# Patient Record
Sex: MALE | Race: WHITE | NOT HISPANIC OR LATINO | Employment: OTHER | URBAN - NONMETROPOLITAN AREA
[De-identification: names, ages, dates, MRNs, and addresses within clinical notes are randomized per-mention and may not be internally consistent; named-entity substitution may affect disease eponyms.]

---

## 2022-01-05 ENCOUNTER — HOSPITAL ENCOUNTER (INPATIENT)
Facility: HOSPITAL | Age: 72
LOS: 6 days | Discharge: HOME | DRG: 177 | End: 2022-01-12
Attending: EMERGENCY MEDICINE | Admitting: INTERNAL MEDICINE
Payer: MEDICARE

## 2022-01-05 ENCOUNTER — APPOINTMENT (EMERGENCY)
Dept: RADIOLOGY | Facility: HOSPITAL | Age: 72
DRG: 177 | End: 2022-01-05
Payer: MEDICARE

## 2022-01-05 DIAGNOSIS — N39.0 UTI (URINARY TRACT INFECTION): Primary | ICD-10-CM

## 2022-01-05 DIAGNOSIS — U07.1 COVID: ICD-10-CM

## 2022-01-05 DIAGNOSIS — R77.8 ELEVATED TROPONIN: ICD-10-CM

## 2022-01-05 DIAGNOSIS — R09.02 HYPOXIA: ICD-10-CM

## 2022-01-05 DIAGNOSIS — K21.9 GERD (GASTROESOPHAGEAL REFLUX DISEASE): ICD-10-CM

## 2022-01-05 DIAGNOSIS — I20.9 ANGINA PECTORIS, UNSPECIFIED (HCC): ICD-10-CM

## 2022-01-05 DIAGNOSIS — I50.20 SYSTOLIC CONGESTIVE HEART FAILURE, UNSPECIFIED HF CHRONICITY (HCC): ICD-10-CM

## 2022-01-05 PROBLEM — G82.20 PARAPLEGIA (HCC): Status: ACTIVE | Noted: 2022-01-05

## 2022-01-05 PROBLEM — L97.522 SKIN ULCER OF TOE OF LEFT FOOT WITH FAT LAYER EXPOSED (HCC): Status: ACTIVE | Noted: 2022-01-05

## 2022-01-05 PROBLEM — L97.512 SKIN ULCER OF TOE OF RIGHT FOOT WITH FAT LAYER EXPOSED (HCC): Status: ACTIVE | Noted: 2022-01-05

## 2022-01-05 PROBLEM — I73.9 PAD (PERIPHERAL ARTERY DISEASE) (HCC): Status: ACTIVE | Noted: 2022-01-05

## 2022-01-05 PROBLEM — N30.00 ACUTE CYSTITIS WITHOUT HEMATURIA: Status: ACTIVE | Noted: 2022-01-05

## 2022-01-05 PROBLEM — J96.01 ACUTE RESPIRATORY FAILURE WITH HYPOXIA (HCC): Status: ACTIVE | Noted: 2022-01-05

## 2022-01-05 LAB
ALBUMIN SERPL BCP-MCNC: 3.8 G/DL (ref 3.5–5)
ALP SERPL-CCNC: 62 U/L (ref 46–116)
ALT SERPL W P-5'-P-CCNC: 64 U/L (ref 12–78)
ANION GAP SERPL CALCULATED.3IONS-SCNC: 11 MMOL/L (ref 4–13)
AST SERPL W P-5'-P-CCNC: 88 U/L (ref 5–45)
BACTERIA UR QL AUTO: ABNORMAL /HPF
BASOPHILS # BLD AUTO: 0 THOUSANDS/ΜL (ref 0–0.1)
BASOPHILS NFR BLD AUTO: 0 % (ref 0–1)
BILIRUB SERPL-MCNC: 0.4 MG/DL (ref 0.2–1)
BILIRUB UR QL STRIP: NEGATIVE
BUN SERPL-MCNC: 12 MG/DL (ref 5–25)
CALCIUM SERPL-MCNC: 8.8 MG/DL (ref 8.3–10.1)
CARDIAC TROPONIN I PNL SERPL HS: 49 NG/L (ref 8–18)
CARDIAC TROPONIN I PNL SERPL HS: 85 NG/L (ref 8–18)
CHLORIDE SERPL-SCNC: 101 MMOL/L (ref 100–108)
CLARITY UR: ABNORMAL
CO2 SERPL-SCNC: 26 MMOL/L (ref 21–32)
COLOR UR: YELLOW
CREAT SERPL-MCNC: 0.67 MG/DL (ref 0.6–1.3)
EOSINOPHIL # BLD AUTO: 0 THOUSAND/ΜL (ref 0–0.61)
EOSINOPHIL NFR BLD AUTO: 0 % (ref 0–6)
ERYTHROCYTE [DISTWIDTH] IN BLOOD BY AUTOMATED COUNT: 13.4 % (ref 11.6–15.1)
GFR SERPL CREATININE-BSD FRML MDRD: 98 ML/MIN/1.73SQ M
GLUCOSE SERPL-MCNC: 82 MG/DL (ref 65–140)
GLUCOSE UR STRIP-MCNC: NEGATIVE MG/DL
HCT VFR BLD AUTO: 35.8 % (ref 36.5–49.3)
HGB BLD-MCNC: 11.9 G/DL (ref 12–17)
HGB UR QL STRIP.AUTO: ABNORMAL
IMM GRANULOCYTES # BLD AUTO: 0.01 THOUSAND/UL (ref 0–0.2)
IMM GRANULOCYTES NFR BLD AUTO: 0 % (ref 0–2)
KETONES UR STRIP-MCNC: ABNORMAL MG/DL
LACTATE SERPL-SCNC: 1.2 MMOL/L (ref 0.5–2)
LEUKOCYTE ESTERASE UR QL STRIP: NEGATIVE
LIPASE SERPL-CCNC: 196 U/L (ref 73–393)
LYMPHOCYTES # BLD AUTO: 0.17 THOUSANDS/ΜL (ref 0.6–4.47)
LYMPHOCYTES NFR BLD AUTO: 6 % (ref 14–44)
MCH RBC QN AUTO: 29 PG (ref 26.8–34.3)
MCHC RBC AUTO-ENTMCNC: 33.2 G/DL (ref 31.4–37.4)
MCV RBC AUTO: 87 FL (ref 82–98)
MONOCYTES # BLD AUTO: 0.09 THOUSAND/ΜL (ref 0.17–1.22)
MONOCYTES NFR BLD AUTO: 3 % (ref 4–12)
MUCOUS THREADS UR QL AUTO: ABNORMAL
NEUTROPHILS # BLD AUTO: 2.76 THOUSANDS/ΜL (ref 1.85–7.62)
NEUTS SEG NFR BLD AUTO: 91 % (ref 43–75)
NITRITE UR QL STRIP: POSITIVE
NON-SQ EPI CELLS URNS QL MICRO: ABNORMAL /HPF
NRBC BLD AUTO-RTO: 0 /100 WBCS
PH UR STRIP.AUTO: 5.5 [PH]
PLATELET # BLD AUTO: 190 THOUSANDS/UL (ref 149–390)
PMV BLD AUTO: 11.2 FL (ref 8.9–12.7)
POTASSIUM SERPL-SCNC: 3.5 MMOL/L (ref 3.5–5.3)
PROT SERPL-MCNC: 8.5 G/DL (ref 6.4–8.2)
PROT UR STRIP-MCNC: NEGATIVE MG/DL
RBC # BLD AUTO: 4.1 MILLION/UL (ref 3.88–5.62)
RBC #/AREA URNS AUTO: ABNORMAL /HPF
SODIUM SERPL-SCNC: 138 MMOL/L (ref 136–145)
SP GR UR STRIP.AUTO: 1.02 (ref 1–1.03)
UROBILINOGEN UR QL STRIP.AUTO: 0.2 E.U./DL
WBC # BLD AUTO: 3.03 THOUSAND/UL (ref 4.31–10.16)
WBC #/AREA URNS AUTO: ABNORMAL /HPF

## 2022-01-05 PROCEDURE — 83605 ASSAY OF LACTIC ACID: CPT | Performed by: PHYSICIAN ASSISTANT

## 2022-01-05 PROCEDURE — 93005 ELECTROCARDIOGRAM TRACING: CPT

## 2022-01-05 PROCEDURE — 84443 ASSAY THYROID STIM HORMONE: CPT | Performed by: STUDENT IN AN ORGANIZED HEALTH CARE EDUCATION/TRAINING PROGRAM

## 2022-01-05 PROCEDURE — 99285 EMERGENCY DEPT VISIT HI MDM: CPT | Performed by: PHYSICIAN ASSISTANT

## 2022-01-05 PROCEDURE — 84484 ASSAY OF TROPONIN QUANT: CPT | Performed by: PHYSICIAN ASSISTANT

## 2022-01-05 PROCEDURE — 83690 ASSAY OF LIPASE: CPT | Performed by: PHYSICIAN ASSISTANT

## 2022-01-05 PROCEDURE — 96374 THER/PROPH/DIAG INJ IV PUSH: CPT

## 2022-01-05 PROCEDURE — 99285 EMERGENCY DEPT VISIT HI MDM: CPT

## 2022-01-05 PROCEDURE — 71045 X-RAY EXAM CHEST 1 VIEW: CPT

## 2022-01-05 PROCEDURE — 96375 TX/PRO/DX INJ NEW DRUG ADDON: CPT

## 2022-01-05 PROCEDURE — 1124F ACP DISCUSS-NO DSCNMKR DOCD: CPT | Performed by: PHYSICIAN ASSISTANT

## 2022-01-05 PROCEDURE — 99220 PR INITIAL OBSERVATION CARE/DAY 70 MINUTES: CPT | Performed by: STUDENT IN AN ORGANIZED HEALTH CARE EDUCATION/TRAINING PROGRAM

## 2022-01-05 PROCEDURE — 96361 HYDRATE IV INFUSION ADD-ON: CPT

## 2022-01-05 PROCEDURE — 81001 URINALYSIS AUTO W/SCOPE: CPT | Performed by: PHYSICIAN ASSISTANT

## 2022-01-05 PROCEDURE — 36415 COLL VENOUS BLD VENIPUNCTURE: CPT | Performed by: PHYSICIAN ASSISTANT

## 2022-01-05 PROCEDURE — 85025 COMPLETE CBC W/AUTO DIFF WBC: CPT | Performed by: PHYSICIAN ASSISTANT

## 2022-01-05 PROCEDURE — 80053 COMPREHEN METABOLIC PANEL: CPT | Performed by: PHYSICIAN ASSISTANT

## 2022-01-05 PROCEDURE — 94640 AIRWAY INHALATION TREATMENT: CPT

## 2022-01-05 RX ORDER — DEXAMETHASONE SODIUM PHOSPHATE 4 MG/ML
10 INJECTION, SOLUTION INTRA-ARTICULAR; INTRALESIONAL; INTRAMUSCULAR; INTRAVENOUS; SOFT TISSUE ONCE
Status: COMPLETED | OUTPATIENT
Start: 2022-01-05 | End: 2022-01-05

## 2022-01-05 RX ORDER — IPRATROPIUM BROMIDE AND ALBUTEROL SULFATE 2.5; .5 MG/3ML; MG/3ML
3 SOLUTION RESPIRATORY (INHALATION) ONCE
Status: COMPLETED | OUTPATIENT
Start: 2022-01-05 | End: 2022-01-05

## 2022-01-05 RX ORDER — CEFTRIAXONE 1 G/50ML
1000 INJECTION, SOLUTION INTRAVENOUS ONCE
Status: COMPLETED | OUTPATIENT
Start: 2022-01-05 | End: 2022-01-05

## 2022-01-05 RX ORDER — ONDANSETRON 2 MG/ML
4 INJECTION INTRAMUSCULAR; INTRAVENOUS ONCE
Status: COMPLETED | OUTPATIENT
Start: 2022-01-05 | End: 2022-01-05

## 2022-01-05 RX ORDER — ACETAMINOPHEN 325 MG/1
650 TABLET ORAL ONCE
Status: DISCONTINUED | OUTPATIENT
Start: 2022-01-05 | End: 2022-01-06

## 2022-01-05 RX ADMIN — SODIUM CHLORIDE 1000 ML: 0.9 INJECTION, SOLUTION INTRAVENOUS at 17:50

## 2022-01-05 RX ADMIN — ONDANSETRON 4 MG: 2 INJECTION INTRAMUSCULAR; INTRAVENOUS at 17:48

## 2022-01-05 RX ADMIN — CEFTRIAXONE 1000 MG: 1 INJECTION, SOLUTION INTRAVENOUS at 21:40

## 2022-01-05 RX ADMIN — DEXAMETHASONE SODIUM PHOSPHATE 10 MG: 4 INJECTION, SOLUTION INTRA-ARTICULAR; INTRALESIONAL; INTRAMUSCULAR; INTRAVENOUS; SOFT TISSUE at 17:47

## 2022-01-05 RX ADMIN — IPRATROPIUM BROMIDE AND ALBUTEROL SULFATE 3 ML: 2.5; .5 SOLUTION RESPIRATORY (INHALATION) at 17:54

## 2022-01-05 NOTE — ED PROVIDER NOTES
History  Chief Complaint   Patient presents with    Nausea     Pt presents to ER cvovid positive since 12/26/21 - reports persistent nausea, weakness, feelings of fatigue and body aches  Pt is a paraplegic x30 years, self caths  Fever of 102 yesterday  Arrives by EMS from home - reports RA of 92, treated with a nasal cannula   Weakness - Generalized     Patient presents to the emergency department today via emergency medical services offering a chief complaint of shortness of breath nausea generalized weakness fatigue in the setting of known COVID positive  He is a paraplegic who lives at home with his wife  His symptoms started on December 26 tested positive that day  He has been noting some upper 80 oxygen saturations at home however here he is 95% no acute distress  Patient states he is unable to get around home either with his wheelchair without experiencing any shortness breath  He complains mostly of the nausea weakness myalgias and fatigue  He does straight cath for urine  Denies underlying lung disease or diabetes  Denies chest pain  None       Past Medical History:   Diagnosis Date    Paraplegia ([address] )     x30 years       History reviewed  No pertinent surgical history  History reviewed  No pertinent family history  I have reviewed and agree with the history as documented  E-Cigarette/Vaping     E-Cigarette/Vaping Substances     Social History     Tobacco Use    Smoking status: Never Smoker    Smokeless tobacco: Never Used   Substance Use Topics    Alcohol use: Not on file    Drug use: Not on file       Review of Systems   Constitutional: Positive for appetite change and fever  Negative for chills  HENT: Negative for ear pain and sore throat  Eyes: Negative for pain and visual disturbance  Respiratory: Positive for shortness of breath  Negative for cough  Cardiovascular: Negative for chest pain and palpitations  Gastrointestinal: Positive for nausea   Negative for abdominal pain and vomiting  Genitourinary: Negative for dysuria and hematuria  Musculoskeletal: Positive for myalgias  Negative for arthralgias and back pain  Skin: Negative for color change and rash  Neurological: Positive for weakness  Negative for seizures and syncope  All other systems reviewed and are negative  Physical Exam  Physical Exam  Vitals and nursing note reviewed  Constitutional:       General: He is not in acute distress  Appearance: He is well-developed  He is not ill-appearing, toxic-appearing or diaphoretic  HENT:      Head: Normocephalic and atraumatic  Eyes:      Extraocular Movements: Extraocular movements intact  Conjunctiva/sclera: Conjunctivae normal    Neck:      Vascular: No carotid bruit  Cardiovascular:      Rate and Rhythm: Normal rate and regular rhythm  Heart sounds: No murmur heard  No friction rub  No gallop  Pulmonary:      Effort: Pulmonary effort is normal  No respiratory distress  Breath sounds: No stridor  Rhonchi present  No wheezing or rales  Chest:      Chest wall: No tenderness  Abdominal:      General: There is no distension  Palpations: Abdomen is soft  There is no mass  Tenderness: There is no abdominal tenderness  There is no guarding or rebound  Hernia: No hernia is present  Musculoskeletal:      Cervical back: Neck supple  No rigidity or tenderness  Lymphadenopathy:      Cervical: No cervical adenopathy  Skin:     General: Skin is warm and dry  Capillary Refill: Capillary refill takes less than 2 seconds  Neurological:      General: No focal deficit present  Mental Status: He is alert and oriented to person, place, and time  Mental status is at baseline  Psychiatric:         Mood and Affect: Mood normal          Behavior: Behavior normal          Thought Content:  Thought content normal          Judgment: Judgment normal          Vital Signs  ED Triage Vitals   Temperature Pulse Respirations Blood Pressure SpO2   01/05/22 1701 01/05/22 1654 01/05/22 1654 01/05/22 1654 01/05/22 1654   99 8 °F (37 7 °C) (!) 108 20 158/88 95 %      Temp Source Heart Rate Source Patient Position - Orthostatic VS BP Location FiO2 (%)   01/05/22 1701 01/05/22 1654 01/05/22 1654 01/05/22 1654 --   Oral Monitor Sitting Left arm       Pain Score       01/05/22 2027       No Pain           Vitals:    01/05/22 1830 01/05/22 2027 01/06/22 0320 01/06/22 0448   BP: 156/71 137/73  137/82   Pulse: (!) 107 98 93 92   Patient Position - Orthostatic VS:  Sitting  Sitting         Visual Acuity      ED Medications  Medications   aspirin (ECOTRIN LOW STRENGTH) EC tablet 81 mg (81 mg Oral Given 1/6/22 0830)   atorvastatin (LIPITOR) tablet 40 mg (has no administration in time range)   sodium chloride 0 9 % infusion (75 mL/hr Intravenous New Bag 1/6/22 0431)   acetaminophen (TYLENOL) tablet 650 mg (has no administration in time range)   polyethylene glycol (MIRALAX) packet 17 g (has no administration in time range)   ondansetron (ZOFRAN) injection 4 mg (has no administration in time range)   enoxaparin (LOVENOX) subcutaneous injection 40 mg (40 mg Subcutaneous Given 1/6/22 0831)   dexamethasone (DECADRON) injection 6 mg (has no administration in time range)   remdesivir (Veklury) 200 mg in sodium chloride 0 9 % 290 mL IVPB (200 mg Intravenous Given 1/6/22 0432)     Followed by   remdesivir Cindi Modest) 100 mg in sodium chloride 0 9 % 270 mL IVPB (has no administration in time range)   cefTRIAXone (ROCEPHIN) IVPB (premix in dextrose) 1,000 mg 50 mL (has no administration in time range)   sodium chloride 0 9 % bolus 1,000 mL (0 mL Intravenous Stopped 1/5/22 2025)   ondansetron (ZOFRAN) injection 4 mg (4 mg Intravenous Given 1/5/22 1748)   dexamethasone (DECADRON) injection 10 mg (10 mg Intravenous Given 1/5/22 1747)   ipratropium-albuterol (DUO-NEB) 0 5-2 5 mg/3 mL inhalation solution 3 mL (3 mL Nebulization Given 1/5/22 1754) cefTRIAXone (ROCEPHIN) IVPB (premix in dextrose) 1,000 mg 50 mL (0 mg Intravenous Stopped 1/5/22 2210)       Diagnostic Studies  Results Reviewed     Procedure Component Value Units Date/Time    Procalcitonin with AM Reflex [[id number]] Collected: 01/06/22 0445    Lab Status: In process Specimen: Blood from Arm, Right Updated: 01/06/22 0814    CKMB [[id number]]  (Abnormal) Collected: 01/06/22 0432    Lab Status: Final result Specimen: Blood from Arm, Right Updated: 01/06/22 0709     CK-MB Index <1 0 %      CK-MB 6 5 ng/mL     CBC and differential [[id number]]  (Abnormal) Collected: 01/06/22 0432    Lab Status: Final result Specimen: Blood from Arm, Right Updated: 01/06/22 0650     WBC 1 79 Thousand/uL      RBC 4 31 Million/uL      Hemoglobin 12 3 g/dL      Hematocrit 38 0 %      MCV 88 fL      MCH 28 5 pg      MCHC 32 4 g/dL      RDW 13 2 %      MPV 10 9 fL      Platelets 136 Thousands/uL     Narrative: This is an appended report  These results have been appended to a previously verified report      Manual Differential(PHLEBS Do Not Order) [[id number]]  (Abnormal) Collected: 01/06/22 0432    Lab Status: Final result Specimen: Blood from Arm, Right Updated: 01/06/22 0650     Segmented % 86 %      Lymphocytes % 5 %      Monocytes % 9 %      Eosinophils, % 0 %      Basophils % 0 %      Absolute Neutrophils 1 54 Thousand/uL      Lymphocytes Absolute 0 09 Thousand/uL      Monocytes Absolute 0 16 Thousand/uL      Eosinophils Absolute 0 00 Thousand/uL      Basophils Absolute 0 00 Thousand/uL      Total Counted --     Platelet Estimate Adequate    D-dimer, quantitative [[id number]]  (Abnormal) Collected: 01/06/22 0432    Lab Status: Final result Specimen: Blood from Arm, Right Updated: 01/06/22 0608     D-Dimer, Quant 1 25 ug/ml FEU     Lipid panel [[id number]]  (Abnormal) Collected: 01/06/22 0432    Lab Status: Final result Specimen: Blood from Arm, Right Updated: 01/06/22 0559     Cholesterol 184 mg/dL      Triglycerides 64 mg/dL      HDL, Direct 30 mg/dL      LDL Calculated 141 mg/dL      Non-HDL-Chol (CHOL-HDL) 154 mg/dl     NT-BNP PRO [[id number]]  (Abnormal) Collected: 01/06/22 0432    Lab Status: Final result Specimen: Blood from Arm, Right Updated: 01/06/22 0559     NT-proBNP 373 pg/mL     C-reactive protein [[id number]]  (Abnormal) Collected: 01/06/22 0432    Lab Status: Final result Specimen: Blood from Arm, Right Updated: 01/06/22 0559     CRP 33 3 mg/L     CK (with reflex to MB) [[id number]]  (Abnormal) Collected: 01/06/22 0432    Lab Status: Final result Specimen: Blood from Arm, Right Updated: 01/06/22 0558     Total  U/L     Comprehensive metabolic panel [[id number]]  (Abnormal) Collected: 01/06/22 0432    Lab Status: Final result Specimen: Blood from Arm, Right Updated: 01/06/22 0539     Sodium 141 mmol/L      Potassium 4 5 mmol/L      Chloride 103 mmol/L      CO2 28 mmol/L      ANION GAP 10 mmol/L      BUN 12 mg/dL      Creatinine 0 63 mg/dL      Glucose 163 mg/dL      Glucose, Fasting 163 mg/dL      Calcium 8 4 mg/dL      Corrected Calcium 9 0 mg/dL      AST 98 U/L      ALT 66 U/L      Alkaline Phosphatase 53 U/L      Total Protein 7 6 g/dL      Albumin 3 2 g/dL      Total Bilirubin 0 38 mg/dL      eGFR 100 ml/min/1 73sq m     Narrative:      [place] guidelines for Chronic Kidney Disease (CKD):     Stage 1 with normal or high GFR (GFR > 90 mL/min/1 73 square meters)    Stage 2 Mild CKD (GFR = 60-89 mL/min/1 73 square meters)    Stage 3A Moderate CKD (GFR = 45-59 mL/min/1 73 square meters)    Stage 3B Moderate CKD (GFR = 30-44 mL/min/1 73 square meters)    Stage 4 Severe CKD (GFR = 15-29 mL/min/1 73 square meters)    Stage 5 End Stage CKD (GFR <15 mL/min/1 73 square meters)  Note: GFR calculation is accurate only with a steady state creatinine    Comprehensive metabolic panel [[id number]]  (Abnormal) Collected: 01/06/22 0432    Lab Status: Final result Specimen: Blood from Arm, Right Updated: 01/06/22 0539     Sodium 141 mmol/L      Potassium 4 3 mmol/L      Chloride 104 mmol/L      CO2 26 mmol/L      ANION GAP 11 mmol/L      BUN 12 mg/dL      Creatinine 0 64 mg/dL      Glucose 164 mg/dL      Glucose, Fasting 164 mg/dL      Calcium 8 3 mg/dL      Corrected Calcium 8 9 mg/dL      AST 94 U/L      ALT 69 U/L      Alkaline Phosphatase 54 U/L      Total Protein 7 7 g/dL      Albumin 3 3 g/dL      Total Bilirubin 0 36 mg/dL      eGFR 99 ml/min/1 73sq m     Narrative:      [facility] guidelines for Chronic Kidney Disease (CKD):     Stage 1 with normal or high GFR (GFR > 90 mL/min/1 73 square meters)    Stage 2 Mild CKD (GFR = 60-89 mL/min/1 73 square meters)    Stage 3A Moderate CKD (GFR = 45-59 mL/min/1 73 square meters)    Stage 3B Moderate CKD (GFR = 30-44 mL/min/1 73 square meters)    Stage 4 Severe CKD (GFR = 15-29 mL/min/1 73 square meters)    Stage 5 End Stage CKD (GFR <15 mL/min/1 73 square meters)  Note: GFR calculation is accurate only with a steady state creatinine    Hemoglobin A1C [[id number]] Collected: 01/06/22 0432    Lab Status: In process Specimen: Blood from Arm, Right Updated: 01/06/22 0508    Procalcitonin with AM Reflex [[id number]] Collected: 01/06/22 0432    Lab Status: In process Specimen: Blood from Arm, Right Updated: 01/06/22 0508    TSH, 3rd generation [[id number]]  (Normal) Collected: 01/05/22 8857    Lab Status: Final result Specimen: Blood from Arm, Right Updated: 01/06/22 0413     TSH 3RD GENERATON 2 282 uIU/mL     Narrative:      Patients undergoing fluorescein dye angiography may retain small amounts of fluorescein in the body for 48-72 hours post procedure  Samples containing fluorescein can produce falsely depressed TSH values  If the patient had this procedure,a specimen should be resubmitted post fluorescein clearance        Urine culture [[id number]]     Lab Status: No result Specimen: Urine     High Sensitivity Troponin I Random [[id number]]  (Abnormal) Collected: 01/05/22 2031    Lab Status: Final result Specimen: Blood from Arm, Right Updated: 01/05/22 2113     HS TnI random 85 ng/L     CBC and differential [[id number]]  (Abnormal) Collected: 01/05/22 2031    Lab Status: Final result Specimen: Blood from Arm, Right Updated: 01/05/22 2104     WBC 3 03 Thousand/uL      RBC 4 10 Million/uL      Hemoglobin 11 9 g/dL      Hematocrit 35 8 %      MCV 87 fL      MCH 29 0 pg      MCHC 33 2 g/dL      RDW 13 4 %      MPV 11 2 fL      Platelets 146 Thousands/uL      nRBC 0 /100 WBCs      Neutrophils Relative 91 %      Immat GRANS % 0 %      Lymphocytes Relative 6 %      Monocytes Relative 3 %      Eosinophils Relative 0 %      Basophils Relative 0 %      Neutrophils Absolute 2 76 Thousands/µL      Immature Grans Absolute 0 01 Thousand/uL      Lymphocytes Absolute 0 17 Thousands/µL      Monocytes Absolute 0 09 Thousand/µL      Eosinophils Absolute 0 00 Thousand/µL      Basophils Absolute 0 00 Thousands/µL     Narrative: This is an appended report  These results have been appended to a previously verified report      Urine Microscopic [[id number]]  (Abnormal) Collected: 01/05/22 2008    Lab Status: Final result Specimen: Urine, Straight Cath Updated: 01/05/22 2027     RBC, UA None Seen /hpf      WBC, UA 4-10 /hpf      Epithelial Cells Occasional /hpf      Bacteria, UA Innumerable /hpf      MUCUS THREADS Occasional    UA (URINE) with reflex to Scope [[id number]]  (Abnormal) Collected: 01/05/22 2008    Lab Status: Final result Specimen: Urine, Straight Cath Updated: 01/05/22 2014     Color, UA Yellow     Clarity, UA Slightly Cloudy     Specific [place], UA 1 020     pH, UA 5 5     Leukocytes, UA Negative     Nitrite, UA Positive     Protein, UA Negative mg/dl      Glucose, UA Negative mg/dl      Ketones, UA 40 (2+) mg/dl      Urobilinogen, UA 0 2 E U /dl      Bilirubin, UA Negative     Blood, UA Trace-Intact    High Sensitivity Troponin I Random [[id number]]  (Abnormal) Collected: 01/05/22 1747    Lab Status: Final result Specimen: Blood from Arm, Right Updated: 01/05/22 1824     HS TnI random 49 ng/L     Lactic acid [[id number]]  (Normal) Collected: 01/05/22 1747    Lab Status: Final result Specimen: Blood from Arm, Right Updated: 01/05/22 1822     LACTIC ACID 1 2 mmol/L     Narrative:      Result may be elevated if tourniquet was used during collection      Comprehensive metabolic panel [[id number]]  (Abnormal) Collected: 01/05/22 1747    Lab Status: Final result Specimen: Blood from Arm, Right Updated: 01/05/22 1819     Sodium 138 mmol/L      Potassium 3 5 mmol/L      Chloride 101 mmol/L      CO2 26 mmol/L      ANION GAP 11 mmol/L      BUN 12 mg/dL      Creatinine 0 67 mg/dL      Glucose 82 mg/dL      Calcium 8 8 mg/dL      AST 88 U/L      ALT 64 U/L      Alkaline Phosphatase 62 U/L      Total Protein 8 5 g/dL      Albumin 3 8 g/dL      Total Bilirubin 0 40 mg/dL      eGFR 98 ml/min/1 73sq m     Narrative:      [place] guidelines for Chronic Kidney Disease (CKD):     Stage 1 with normal or high GFR (GFR > 90 mL/min/1 73 square meters)    Stage 2 Mild CKD (GFR = 60-89 mL/min/1 73 square meters)    Stage 3A Moderate CKD (GFR = 45-59 mL/min/1 73 square meters)    Stage 3B Moderate CKD (GFR = 30-44 mL/min/1 73 square meters)    Stage 4 Severe CKD (GFR = 15-29 mL/min/1 73 square meters)    Stage 5 End Stage CKD (GFR <15 mL/min/1 73 square meters)  Note: GFR calculation is accurate only with a steady state creatinine    Lipase [[id number]]  (Normal) Collected: 01/05/22 1747    Lab Status: Final result Specimen: Blood from Arm, Right Updated: 01/05/22 1814     Lipase 196 u/L                  XR chest 1 view portable   ED Interpretation by Elizabeth Means PA-C (01/05 1939)   Bilateral patchy infiltrates consistent COVID 19 pneumonia      Final Result by Luna Calabrese MD (01/06 0809)      Patchy bilateral groundglass opacities in keeping with Covid associated pneumonia                    Workstation performed: [id number]                    Procedures  ECG 12 Lead Documentation Only    Date/Time: 1/5/2022 8:52 PM  Performed by: Noe Fischer PA-C  Authorized by: Noe Fischer PA-C     Indications / Diagnosis:  Shortness of breath hypoxia  ECG reviewed by me, the ED Provider: yes    Patient location:  ED  Previous ECG:     Comparison to cardiac monitor: Yes    Interpretation:     Interpretation: non-specific    Rate:     ECG rate:  106    ECG rate assessment: tachycardic    Rhythm:     Rhythm: sinus tachycardia    Ectopy:     Ectopy: none    QRS:     QRS axis:  Normal    QRS intervals:  Normal  Conduction:     Conduction: normal    ST segments:     ST segments:  Normal  T waves:     T waves: normal               ED Course  ED Course as of 01/06/22 0909 Wed Jan 05, 2022   1734 SpO2: 95 %   1734 Respirations: 20   1734 Pulse(!): 108   1734 Temperature: 99 8 °F (37 7 °C)   1734 Blood Pressure: 158/88   1841 Lipase: 196   1842 LACTIC ACID: 1 2   1842 Potassium: 3 5   1842 TOTAL BILIRUBIN: 0 40   1842 eGFR: 98   1842 TOTAL BILIRUBIN: 0 40   1857 Patient did to 86 percent nasal cannula was added 2 liters responded nicely   2021 Nitrite, UA(!): Positive   2021 Leukocytes, UA: Negative   2039 Bacteria, UA(!): Innumerable   2039 WBC, UA(!): 4-10   2050 WBC(!): 3 03   2050 HCT(!): 35 8   2112 TT sent to [facility]   2135 Pt was re-evaluated at bedside, stable appearing has received approx 1750 cc NS, will recheck FSG             HEART Risk Score      Most Recent Value   Heart Score Risk Calculator    History 0 Filed at: 01/05/2022 2054   ECG 0 Filed at: 01/05/2022 2054   Age 2 Filed at: 01/05/2022 2054   Risk Factors 1 Filed at: 01/05/2022 2054   Troponin 2 Filed at: 01/05/2022 2054   HEART Score 5 Filed at: 01/05/2022 2054                                      MDM    Disposition  Final diagnoses:   UTI (urinary tract infection) Hypoxia     Time reflects when diagnosis was documented in both MDM as applicable and the Disposition within this note     Time User Action Codes Description Comment    1/5/2022  8:50 PM Daija HINTON Add [N39 0] UTI (urinary tract infection)     1/5/2022  8:50 PM Jenell Mortimer Add [R09 02] Hypoxia       ED Disposition     ED Disposition Condition Date/Time Comment    Admit Stable Wed Jan 5, 2022  9:22 PM Case was discussed with Dr Steven Garza and the patient's admission status was agreed to be Admission Status: observation status to the service of Dr Steven Garza   Follow-up Information    None         Patient's Medications    No medications on file       No discharge procedures on file      PDMP Review     None          ED Provider  Electronically Signed by           Radha Belcher PA-C  01/05/22 2128       Radha Belcher PA-C  01/06/22 4823

## 2022-01-06 ENCOUNTER — APPOINTMENT (OUTPATIENT)
Dept: NON INVASIVE DIAGNOSTICS | Facility: HOSPITAL | Age: 72
DRG: 177 | End: 2022-01-06
Payer: MEDICARE

## 2022-01-06 LAB
ABO GROUP BLD: NORMAL
ALBUMIN SERPL BCP-MCNC: 3.2 G/DL (ref 3.5–5)
ALBUMIN SERPL BCP-MCNC: 3.3 G/DL (ref 3.5–5)
ALP SERPL-CCNC: 53 U/L (ref 46–116)
ALP SERPL-CCNC: 54 U/L (ref 46–116)
ALT SERPL W P-5'-P-CCNC: 66 U/L (ref 12–78)
ALT SERPL W P-5'-P-CCNC: 69 U/L (ref 12–78)
ANION GAP SERPL CALCULATED.3IONS-SCNC: 10 MMOL/L (ref 4–13)
ANION GAP SERPL CALCULATED.3IONS-SCNC: 11 MMOL/L (ref 4–13)
AST SERPL W P-5'-P-CCNC: 94 U/L (ref 5–45)
AST SERPL W P-5'-P-CCNC: 98 U/L (ref 5–45)
ATRIAL RATE: 106 BPM
BASOPHILS # BLD MANUAL: 0 THOUSAND/UL (ref 0–0.1)
BASOPHILS NFR MAR MANUAL: 0 % (ref 0–1)
BILIRUB SERPL-MCNC: 0.36 MG/DL (ref 0.2–1)
BILIRUB SERPL-MCNC: 0.38 MG/DL (ref 0.2–1)
BUN SERPL-MCNC: 12 MG/DL (ref 5–25)
BUN SERPL-MCNC: 12 MG/DL (ref 5–25)
CALCIUM ALBUM COR SERPL-MCNC: 8.9 MG/DL (ref 8.3–10.1)
CALCIUM ALBUM COR SERPL-MCNC: 9 MG/DL (ref 8.3–10.1)
CALCIUM SERPL-MCNC: 8.3 MG/DL (ref 8.3–10.1)
CALCIUM SERPL-MCNC: 8.4 MG/DL (ref 8.3–10.1)
CHLORIDE SERPL-SCNC: 103 MMOL/L (ref 100–108)
CHLORIDE SERPL-SCNC: 104 MMOL/L (ref 100–108)
CHOLEST SERPL-MCNC: 184 MG/DL
CK MB SERPL-MCNC: 6.5 NG/ML (ref 0–5)
CK MB SERPL-MCNC: 8.5 NG/ML (ref 0–5)
CK MB SERPL-MCNC: <1 % (ref 0–2.5)
CK MB SERPL-MCNC: <1 % (ref 0–2.5)
CK SERPL-CCNC: 878 U/L (ref 39–308)
CK SERPL-CCNC: 917 U/L (ref 39–308)
CO2 SERPL-SCNC: 26 MMOL/L (ref 21–32)
CO2 SERPL-SCNC: 28 MMOL/L (ref 21–32)
CREAT SERPL-MCNC: 0.63 MG/DL (ref 0.6–1.3)
CREAT SERPL-MCNC: 0.64 MG/DL (ref 0.6–1.3)
CRP SERPL QL: 33.3 MG/L
D DIMER PPP FEU-MCNC: 1.25 UG/ML FEU
EOSINOPHIL # BLD MANUAL: 0 THOUSAND/UL (ref 0–0.4)
EOSINOPHIL NFR BLD MANUAL: 0 % (ref 0–6)
ERYTHROCYTE [DISTWIDTH] IN BLOOD BY AUTOMATED COUNT: 13.2 % (ref 11.6–15.1)
GFR SERPL CREATININE-BSD FRML MDRD: 100 ML/MIN/1.73SQ M
GFR SERPL CREATININE-BSD FRML MDRD: 99 ML/MIN/1.73SQ M
GLUCOSE P FAST SERPL-MCNC: 163 MG/DL (ref 65–99)
GLUCOSE P FAST SERPL-MCNC: 164 MG/DL (ref 65–99)
GLUCOSE SERPL-MCNC: 163 MG/DL (ref 65–140)
GLUCOSE SERPL-MCNC: 164 MG/DL (ref 65–140)
HCT VFR BLD AUTO: 38 % (ref 36.5–49.3)
HDLC SERPL-MCNC: 30 MG/DL
HGB BLD-MCNC: 12.3 G/DL (ref 12–17)
LDLC SERPL CALC-MCNC: 141 MG/DL (ref 0–100)
LYMPHOCYTES # BLD AUTO: 0.09 THOUSAND/UL (ref 0.6–4.47)
LYMPHOCYTES # BLD AUTO: 5 % (ref 14–44)
MCH RBC QN AUTO: 28.5 PG (ref 26.8–34.3)
MCHC RBC AUTO-ENTMCNC: 32.4 G/DL (ref 31.4–37.4)
MCV RBC AUTO: 88 FL (ref 82–98)
MONOCYTES # BLD AUTO: 0.16 THOUSAND/UL (ref 0–1.22)
MONOCYTES NFR BLD: 9 % (ref 4–12)
NEUTROPHILS # BLD MANUAL: 1.54 THOUSAND/UL (ref 1.85–7.62)
NEUTS SEG NFR BLD AUTO: 86 % (ref 43–75)
NONHDLC SERPL-MCNC: 154 MG/DL
NT-PROBNP SERPL-MCNC: 373 PG/ML
P AXIS: 56 DEGREES
PLATELET # BLD AUTO: 178 THOUSANDS/UL (ref 149–390)
PLATELET # BLD AUTO: 189 THOUSANDS/UL (ref 149–390)
PLATELET BLD QL SMEAR: ADEQUATE
PMV BLD AUTO: 10.9 FL (ref 8.9–12.7)
PMV BLD AUTO: 11 FL (ref 8.9–12.7)
POTASSIUM SERPL-SCNC: 4.3 MMOL/L (ref 3.5–5.3)
POTASSIUM SERPL-SCNC: 4.5 MMOL/L (ref 3.5–5.3)
PR INTERVAL: 148 MS
PROCALCITONIN SERPL-MCNC: <0.05 NG/ML
PROCALCITONIN SERPL-MCNC: <0.05 NG/ML
PROT SERPL-MCNC: 7.6 G/DL (ref 6.4–8.2)
PROT SERPL-MCNC: 7.7 G/DL (ref 6.4–8.2)
QRS AXIS: 35 DEGREES
QRSD INTERVAL: 80 MS
QT INTERVAL: 370 MS
QTC INTERVAL: 491 MS
RBC # BLD AUTO: 4.31 MILLION/UL (ref 3.88–5.62)
RH BLD: NEGATIVE
SODIUM SERPL-SCNC: 141 MMOL/L (ref 136–145)
SODIUM SERPL-SCNC: 141 MMOL/L (ref 136–145)
T WAVE AXIS: 66 DEGREES
TRIGL SERPL-MCNC: 64 MG/DL
TSH SERPL DL<=0.05 MIU/L-ACNC: 0.35 UIU/ML (ref 0.36–3.74)
TSH SERPL DL<=0.05 MIU/L-ACNC: 2.28 UIU/ML (ref 0.36–3.74)
VENTRICULAR RATE: 106 BPM
WBC # BLD AUTO: 1.79 THOUSAND/UL (ref 4.31–10.16)

## 2022-01-06 PROCEDURE — 85027 COMPLETE CBC AUTOMATED: CPT | Performed by: STUDENT IN AN ORGANIZED HEALTH CARE EDUCATION/TRAINING PROGRAM

## 2022-01-06 PROCEDURE — 93970 EXTREMITY STUDY: CPT | Performed by: SURGERY

## 2022-01-06 PROCEDURE — 86900 BLOOD TYPING SEROLOGIC ABO: CPT | Performed by: STUDENT IN AN ORGANIZED HEALTH CARE EDUCATION/TRAINING PROGRAM

## 2022-01-06 PROCEDURE — 85007 BL SMEAR W/DIFF WBC COUNT: CPT | Performed by: STUDENT IN AN ORGANIZED HEALTH CARE EDUCATION/TRAINING PROGRAM

## 2022-01-06 PROCEDURE — 82553 CREATINE MB FRACTION: CPT | Performed by: INTERNAL MEDICINE

## 2022-01-06 PROCEDURE — 80061 LIPID PANEL: CPT | Performed by: STUDENT IN AN ORGANIZED HEALTH CARE EDUCATION/TRAINING PROGRAM

## 2022-01-06 PROCEDURE — 93010 ELECTROCARDIOGRAM REPORT: CPT | Performed by: INTERNAL MEDICINE

## 2022-01-06 PROCEDURE — 82550 ASSAY OF CK (CPK): CPT | Performed by: INTERNAL MEDICINE

## 2022-01-06 PROCEDURE — 85049 AUTOMATED PLATELET COUNT: CPT

## 2022-01-06 PROCEDURE — 93970 EXTREMITY STUDY: CPT

## 2022-01-06 PROCEDURE — 99223 1ST HOSP IP/OBS HIGH 75: CPT | Performed by: INTERNAL MEDICINE

## 2022-01-06 PROCEDURE — 82553 CREATINE MB FRACTION: CPT | Performed by: STUDENT IN AN ORGANIZED HEALTH CARE EDUCATION/TRAINING PROGRAM

## 2022-01-06 PROCEDURE — 87086 URINE CULTURE/COLONY COUNT: CPT | Performed by: STUDENT IN AN ORGANIZED HEALTH CARE EDUCATION/TRAINING PROGRAM

## 2022-01-06 PROCEDURE — 83036 HEMOGLOBIN GLYCOSYLATED A1C: CPT | Performed by: STUDENT IN AN ORGANIZED HEALTH CARE EDUCATION/TRAINING PROGRAM

## 2022-01-06 PROCEDURE — 99233 SBSQ HOSP IP/OBS HIGH 50: CPT | Performed by: INTERNAL MEDICINE

## 2022-01-06 PROCEDURE — 86901 BLOOD TYPING SEROLOGIC RH(D): CPT | Performed by: STUDENT IN AN ORGANIZED HEALTH CARE EDUCATION/TRAINING PROGRAM

## 2022-01-06 PROCEDURE — 84443 ASSAY THYROID STIM HORMONE: CPT

## 2022-01-06 PROCEDURE — 36415 COLL VENOUS BLD VENIPUNCTURE: CPT | Performed by: STUDENT IN AN ORGANIZED HEALTH CARE EDUCATION/TRAINING PROGRAM

## 2022-01-06 PROCEDURE — XW033E5 INTRODUCTION OF REMDESIVIR ANTI-INFECTIVE INTO PERIPHERAL VEIN, PERCUTANEOUS APPROACH, NEW TECHNOLOGY GROUP 5: ICD-10-PCS | Performed by: STUDENT IN AN ORGANIZED HEALTH CARE EDUCATION/TRAINING PROGRAM

## 2022-01-06 PROCEDURE — 82550 ASSAY OF CK (CPK): CPT | Performed by: STUDENT IN AN ORGANIZED HEALTH CARE EDUCATION/TRAINING PROGRAM

## 2022-01-06 PROCEDURE — 85379 FIBRIN DEGRADATION QUANT: CPT | Performed by: STUDENT IN AN ORGANIZED HEALTH CARE EDUCATION/TRAINING PROGRAM

## 2022-01-06 PROCEDURE — 84145 PROCALCITONIN (PCT): CPT | Performed by: STUDENT IN AN ORGANIZED HEALTH CARE EDUCATION/TRAINING PROGRAM

## 2022-01-06 PROCEDURE — 80053 COMPREHEN METABOLIC PANEL: CPT | Performed by: STUDENT IN AN ORGANIZED HEALTH CARE EDUCATION/TRAINING PROGRAM

## 2022-01-06 PROCEDURE — 86140 C-REACTIVE PROTEIN: CPT | Performed by: STUDENT IN AN ORGANIZED HEALTH CARE EDUCATION/TRAINING PROGRAM

## 2022-01-06 PROCEDURE — 83880 ASSAY OF NATRIURETIC PEPTIDE: CPT | Performed by: STUDENT IN AN ORGANIZED HEALTH CARE EDUCATION/TRAINING PROGRAM

## 2022-01-06 RX ORDER — ACETAMINOPHEN 325 MG/1
650 TABLET ORAL EVERY 6 HOURS PRN
Status: DISCONTINUED | OUTPATIENT
Start: 2022-01-06 | End: 2022-01-06

## 2022-01-06 RX ORDER — ACETAMINOPHEN 325 MG/1
650 TABLET ORAL EVERY 6 HOURS PRN
Status: DISCONTINUED | OUTPATIENT
Start: 2022-01-06 | End: 2022-01-12 | Stop reason: HOSPADM

## 2022-01-06 RX ORDER — ONDANSETRON 2 MG/ML
4 INJECTION INTRAMUSCULAR; INTRAVENOUS EVERY 6 HOURS PRN
Status: DISCONTINUED | OUTPATIENT
Start: 2022-01-06 | End: 2022-01-12 | Stop reason: HOSPADM

## 2022-01-06 RX ORDER — DEXAMETHASONE SODIUM PHOSPHATE 4 MG/ML
6 INJECTION, SOLUTION INTRA-ARTICULAR; INTRALESIONAL; INTRAMUSCULAR; INTRAVENOUS; SOFT TISSUE EVERY 24 HOURS
Status: DISCONTINUED | OUTPATIENT
Start: 2022-01-06 | End: 2022-01-10

## 2022-01-06 RX ORDER — DOCUSATE SODIUM 100 MG/1
100 CAPSULE, LIQUID FILLED ORAL 2 TIMES DAILY
Status: DISCONTINUED | OUTPATIENT
Start: 2022-01-06 | End: 2022-01-12 | Stop reason: HOSPADM

## 2022-01-06 RX ORDER — ASPIRIN 81 MG/1
81 TABLET ORAL DAILY
Status: DISCONTINUED | OUTPATIENT
Start: 2022-01-06 | End: 2022-01-12 | Stop reason: HOSPADM

## 2022-01-06 RX ORDER — SODIUM CHLORIDE 9 MG/ML
75 INJECTION, SOLUTION INTRAVENOUS CONTINUOUS
Status: DISCONTINUED | OUTPATIENT
Start: 2022-01-06 | End: 2022-01-09

## 2022-01-06 RX ORDER — ATORVASTATIN CALCIUM 40 MG/1
40 TABLET, FILM COATED ORAL
Status: DISCONTINUED | OUTPATIENT
Start: 2022-01-06 | End: 2022-01-06

## 2022-01-06 RX ORDER — SODIUM CHLORIDE 9 MG/ML
100 INJECTION, SOLUTION INTRAVENOUS CONTINUOUS
Status: DISCONTINUED | OUTPATIENT
Start: 2022-01-06 | End: 2022-01-06

## 2022-01-06 RX ORDER — MAGNESIUM HYDROXIDE/ALUMINUM HYDROXICE/SIMETHICONE 120; 1200; 1200 MG/30ML; MG/30ML; MG/30ML
30 SUSPENSION ORAL EVERY 6 HOURS PRN
Status: DISCONTINUED | OUTPATIENT
Start: 2022-01-06 | End: 2022-01-12 | Stop reason: HOSPADM

## 2022-01-06 RX ORDER — POLYETHYLENE GLYCOL 3350 17 G/17G
17 POWDER, FOR SOLUTION ORAL DAILY PRN
Status: DISCONTINUED | OUTPATIENT
Start: 2022-01-06 | End: 2022-01-12 | Stop reason: HOSPADM

## 2022-01-06 RX ORDER — ONDANSETRON 2 MG/ML
4 INJECTION INTRAMUSCULAR; INTRAVENOUS EVERY 6 HOURS PRN
Status: DISCONTINUED | OUTPATIENT
Start: 2022-01-06 | End: 2022-01-06

## 2022-01-06 RX ORDER — CEFTRIAXONE 1 G/50ML
1000 INJECTION, SOLUTION INTRAVENOUS EVERY 24 HOURS
Status: DISCONTINUED | OUTPATIENT
Start: 2022-01-06 | End: 2022-01-07

## 2022-01-06 RX ADMIN — CEFTRIAXONE 1000 MG: 1 INJECTION, SOLUTION INTRAVENOUS at 20:48

## 2022-01-06 RX ADMIN — SODIUM CHLORIDE 75 ML/HR: 0.9 INJECTION, SOLUTION INTRAVENOUS at 10:57

## 2022-01-06 RX ADMIN — ASPIRIN 81 MG: 81 TABLET, COATED ORAL at 08:30

## 2022-01-06 RX ADMIN — ENOXAPARIN SODIUM 40 MG: 40 INJECTION SUBCUTANEOUS at 08:31

## 2022-01-06 RX ADMIN — SODIUM CHLORIDE 75 ML/HR: 0.9 INJECTION, SOLUTION INTRAVENOUS at 04:31

## 2022-01-06 RX ADMIN — SODIUM CHLORIDE 100 ML/HR: 0.9 INJECTION, SOLUTION INTRAVENOUS at 09:48

## 2022-01-06 RX ADMIN — ENOXAPARIN SODIUM 30 MG: 30 INJECTION SUBCUTANEOUS at 20:48

## 2022-01-06 RX ADMIN — DEXAMETHASONE SODIUM PHOSPHATE 6 MG: 4 INJECTION, SOLUTION INTRA-ARTICULAR; INTRALESIONAL; INTRAMUSCULAR; INTRAVENOUS; SOFT TISSUE at 17:54

## 2022-01-06 RX ADMIN — REMDESIVIR 200 MG: 100 INJECTION, POWDER, LYOPHILIZED, FOR SOLUTION INTRAVENOUS at 04:32

## 2022-01-06 NOTE — ASSESSMENT & PLAN NOTE
Patient presented with weakness, nausea, fatigue, body aches-> was found to be COVID positive on 12/26/2021  EMS recorded oxygen saturation of low 80s    ED course  Vital signs:   Tachycardic 108, respiratory rate 22, saturating 98% on 2 L NC  Labs:  Urinalysis positive for UTI, troponin 49->85  Imaging: chest x-ray pending  Meds:  Tylenol, Rocephin, Decadron, DuoNeb, Zofran, 1 L normal saline  EKG showing normal sinus rhythm    Mild COVID protocol-> currently on 2L NC  ABO  Daily CBC and CMP  Troponin continue to trend  CK  BNP  CRP  Procal  D-dimer  Decadron  Remdesivir  Respiratory protocol  Incentive spirometry  Acapella  Mucinex  Tessalon Perles  Will initiate regular DVT prophylaxis with Lovenox as D-dimer is pending

## 2022-01-06 NOTE — ASSESSMENT & PLAN NOTE
Non MI trop elevation, Most likely due to COVID and UTI  EKG showing sinus tachycardia  Patient has no symptoms of chest pain

## 2022-01-06 NOTE — ASSESSMENT & PLAN NOTE
UA positive nitrites, bacteria and white blood cells    Continue Rocephin  Avoid nephrotoxic agents  IVF  Follow Urine Cx and sensitivity    Monitor I's and O's

## 2022-01-06 NOTE — ASSESSMENT & PLAN NOTE
This is a chronic stable condition    Had telephone pole fall on him and since then has had paraplegia  Does well and self caths

## 2022-01-06 NOTE — ED NOTES
Patients oxygen saturation 85-89%  He removed his oxygen  Patient was placed back onto 2L and oxygen improved to 93%       Alex Hartmann RN  01/06/22 0369

## 2022-01-06 NOTE — CONSULTS
Pulmonary Medicine-Consultation  Sandee Blue 73 y o  male MRN: [id number]  Unit/Bed#: RM06 Encounter: [id number]      Assessment  1  Acute hypoxic respiratory insufficiency  2  COVID-19 pneumonia, unvaccinated  3  Paraplegia  4  Elevated Troponin  5  Peripheral Artery Disease  6  Chronic lymphedema/leg swelling    Plan:  · Currently on 2 L nasal cannula with saturations in the mid to high 90s, upon discontinuing the oxygen, his saturations did fall to the low 90s  · Currently on mild COVID pathway continue IV remdesivir, 6 mg daily of dexamethasone  · Chest x-ray reviewed, evidence of bilateral airspace opacities consistent with COVID-19 pneumonia  · D-dimer obtained in the emergency room was 1 25, continue prophylactic anticoagulation while he is in the hospital  · There is discussion regarding discharge the patient, he does have risk factors for progression including slightly elevated BMI, paraplegia  · Obtain venous duplex  · Discussed with Medicine Team, will monitor inpatient setting over the next 24 hours, if remains stable, can consider discharge with p o  Dexamethasone and consideration of short course of therapeutic anticoagulation given his risk factors for VT including COVID-19 pneumonia as well as paraplegia and decreased mobility    Discussed with SL[facility]  Will continue to follow    History of Present Illness   Physician Requesting Consult: Brandon Perez MD  Reason for Consult / Principal Problem: COVID19    HPI: Sandee Blue is a 73 y o   male who presented to [facility] with complaints of shortness of breath, nausea, fatigue  He has been having symptoms since around [place] time, he says that he initially tested positive for COVID on 12/26  He is on vaccine needed, he does have a close contact, his wife who is vaccinated also does have [place]  Day and has a medical history of paraplegia he performs self-catheterizations at home    He is currently in the observation unit in the emergency room and is on 2 L of oxygen with saturations in the high 90s  Inpatient consult to Pulmonology  Consult performed by: Gabriela Palm MD  Consult ordered by: Chelo Briscoe MD        Review of Systems   Constitutional: Negative for activity change, chills and fever  HENT: Negative for congestion, rhinorrhea, sneezing and voice change  Respiratory: Negative for cough, shortness of breath and wheezing  Cardiovascular: Negative for chest pain and palpitations  Gastrointestinal: Negative for abdominal distention, abdominal pain, diarrhea, nausea and vomiting  Endocrine: Negative for cold intolerance and heat intolerance  Musculoskeletal: Negative for arthralgias, back pain, myalgias and neck stiffness  Skin: Negative for color change, pallor and rash  Neurological: Negative for dizziness, weakness and numbness  Psychiatric/Behavioral: Negative for agitation  The patient is not nervous/anxious  Historical Information   Past Medical History:   Diagnosis Date    Paraplegia ([facility] [address] )     x30 years     History reviewed  No pertinent surgical history  Social History   Social History     Substance and Sexual Activity   Alcohol Use None     Social History     Substance and Sexual Activity   Drug Use Not on file     Social History     Tobacco Use   Smoking Status Never Smoker   Smokeless Tobacco Never Used     E-Cigarette/Vaping     E-Cigarette/Vaping Substances     Family History: non-contributory    Meds/Allergies   all current active meds have been reviewed    No Known Allergies    Objective   Vitals: Blood pressure 152/80, pulse 90, temperature (!) 97 °F (36 1 °C), temperature source Temporal, resp  rate 20, height 5' 4" (1 626 m), weight 76 6 kg (168 lb 14 oz), SpO2 96 %  ,Body mass index is 28 99 kg/m²      Intake/Output Summary (Last 24 hours) at 1/6/2022 1354  Last data filed at 1/6/2022 0519  Gross per 24 hour   Intake 1050 ml   Output 1025 ml   Net 25 ml     Invasive Devices  Report Peripheral Intravenous Line            Peripheral IV 01/05/22 Right Antecubital <1 day          Drain            Urethral Catheter Straight-tip 14 Fr  <1 day                Physical Exam  Vitals reviewed  Constitutional:       Appearance: He is well-developed  HENT:      Head: Normocephalic and atraumatic  Eyes:      Extraocular Movements: Extraocular movements intact  Pupils: Pupils are equal, round, and reactive to light  Cardiovascular:      Rate and Rhythm: Normal rate and regular rhythm  Pulmonary:      Effort: Pulmonary effort is normal       Breath sounds: No wheezing or rhonchi  Abdominal:      General: Bowel sounds are normal       Palpations: Abdomen is soft  Musculoskeletal:         General: Normal range of motion  Cervical back: Normal range of motion and neck supple  Right lower leg: No edema  Left lower leg: No edema  Skin:     General: Skin is warm and dry  Neurological:      General: No focal deficit present  Mental Status: He is alert and oriented to person, place, and time  Psychiatric:         Mood and Affect: Mood normal          Behavior: Behavior normal        Lab Results: I have personally reviewed pertinent lab results  Imaging Studies: I have personally reviewed pertinent reports  and I have personally reviewed pertinent films in PACS     EKG, Pathology, and Other Studies: I have personally reviewed pertinent reports  and I have personally reviewed pertinent films in PACS     Pulmonary Results (PFTs, PSG): I have personally reviewed pertinent reports  and I have personally reviewed pertinent films in PACS     VTE Prophylaxis: Sequential compression device (Venodyne)     Code Status: Level 1 - Full Code    None    Portions of the record may have been created with voice recognition software  Occasional wrong word or "sound a like" substitutions may have occurred due to the inherent limitations of voice recognition software    Read the chart carefully and recognize, using context, where substitutions have occurred

## 2022-01-06 NOTE — ASSESSMENT & PLAN NOTE
Patient presented with weakness, nausea, fatigue, body aches -> was found to be COVID positive on 12/26/2021  Wife COVID +ve on 12/25/2021  EMS recorded oxygen saturation of low 80s    CXR on 01/05: shows pathcy bilateral ground glass opacities   CRP elevate at 33; Procalcitonin elevated    Ramdesivir day#2  Dexamethasone day#2   Lovenox 30 for VTE prophylaxis  Consulted Pulmonology: recommendations highly appreciated   - Currently on 2L O2 via NC, observe for 24hr and if patient is clinically stable discharge on home oxygen, steroids x 10d and monoclonal antibodies     - Patient is paraplegic with peripheral edema - LE venus doppler to r/out DVT     Incentive spirometry  Follow CRP  I&Os

## 2022-01-06 NOTE — ASSESSMENT & PLAN NOTE
No O2 requirement at baseline  Home pulse Ox shows drop in SpO2 to 80s  Currently requiring 2L O2 via NC      See mgx of COVID

## 2022-01-06 NOTE — PROGRESS NOTES
[facility] [address]  Progress Note David Durbin 1950, 73 y o  male MRN: [id number]  Unit/Bed#: RM06 Encounter: [id number]  Primary Care Provider: No primary care provider on file  Date and time admitted to hospital: 1/5/2022  4:52 PM    * Katie Gillis 22  Patient presented with weakness, nausea, fatigue, body aches -> was found to be COVID positive on 12/26/2021  Wife COVID +ve on 12/25/2021  EMS recorded oxygen saturation of low 80s    CXR on 01/05: shows pathcy bilateral ground glass opacities   CRP elevate at 33; Procalcitonin elevated    Ramdesivir day#2  Dexamethasone day#2   Lovenox 30 for VTE prophylaxis  Consulted Pulmonology: recommendations highly appreciated   - Currently on 2L O2 via NC, observe for 24hr and if patient is clinically stable discharge on home oxygen, steroids x 10d and monoclonal antibodies  - Patient is paraplegic with peripheral edema - LE venus doppler to r/out DVT     Incentive spirometry  Follow CRP  I&Os            Acute respiratory failure with hypoxia (HCC)  Assessment & Plan  No O2 requirement at baseline  Home pulse Ox shows drop in SpO2 to 80s  Currently requiring 2L O2 via NC  See mgx of COVID    Acute cystitis without hematuria  Assessment & Plan  UA positive nitrites, bacteria and white blood cells    Continue Rocephin  Avoid nephrotoxic agents  IVF  Follow Urine Cx and sensitivity  Monitor I's and O's    Elevated troponin  Assessment & Plan  Non MI trop elevation, Most likely due to COVID and UTI  EKG showing sinus tachycardia  Patient has no symptoms of chest pain        PAD (peripheral artery disease) ([address] )  Assessment & Plan  This is a chronic condition     continue ASA 81        Skin ulcer of toe of right foot with fat layer exposed ([address] )  Assessment & Plan  See management of skin ulcer of left foot    Skin ulcer of toe of left foot with fat layer exposed ([address] )  Assessment & Plan  Goes to wound care regularly  Vascular surgery referral was placed recently  Consult wound care    Paraplegia [facility])  Assessment & Plan  This is a chronic stable condition  Had telephone pole fall on him and since then has had paraplegia  Does well and self caths      VTE Pharmacologic Prophylaxis:   Pharmacologic: Enoxaparin (Lovenox)  Mechanical VTE Prophylaxis in Place: Yes    Patient Centered Rounds: I have performed bedside rounds with nursing staff today  Discussions with Specialists or Other Care Team Provider: yes    Education and Discussions with Family / Patient: yes    Time Spent for Care: 30 minutes  More than 50% of total time spent on counseling and coordination of care as described above  Current Length of Stay: 0 day(s)    Current Patient Status: Observation   Certification Statement: The patient will continue to require additional inpatient hospital stay due to ongoing treatment    Discharge Plan: home on home oxygen    Code Status: Level 1 - Full Code      Subjective:   Seen and examined the patient at bedside today, noted distress  Complains of nausea  Denies chest pain, chest tightness, palpitations, dizziness, lightheadedness  Objective:     Vitals:   Temp (24hrs), Av 4 °F (36 9 °C), Min:97 °F (36 1 °C), Max:99 8 °F (37 7 °C)    Temp:  [97 °F (36 1 °C)-99 8 °F (37 7 °C)] 97 °F (36 1 °C)  HR:  [] 80  Resp:  [18-22] 20  BP: (111-158)/(55-88) 112/55  SpO2:  [88 %-98 %] 88 %  Body mass index is 28 99 kg/m²  Input and Output Summary (last 24 hours): Intake/Output Summary (Last 24 hours) at 2022 1423  Last data filed at 2022 0519  Gross per 24 hour   Intake 1050 ml   Output 1025 ml   Net 25 ml       Physical Exam:     Physical Exam  Vitals and nursing note reviewed  Constitutional:       General: He is not in acute distress  Appearance: He is well-developed  He is ill-appearing  HENT:      Head: Normocephalic and atraumatic        Right Ear: External ear normal       Left Ear: External ear normal  Mouth/Throat:      Mouth: Mucous membranes are moist       Pharynx: Oropharynx is clear  Eyes:      General: No scleral icterus  Extraocular Movements: Extraocular movements intact  Conjunctiva/sclera: Conjunctivae normal    Cardiovascular:      Rate and Rhythm: Normal rate and regular rhythm  Pulses: Normal pulses  Heart sounds: Normal heart sounds  No murmur heard  Pulmonary:      Effort: Pulmonary effort is normal       Breath sounds: Rales present  No wheezing  Abdominal:      General: Bowel sounds are normal       Palpations: Abdomen is soft  Tenderness: There is no abdominal tenderness  Musculoskeletal:      Cervical back: Neck supple  Right lower leg: Edema present  Left lower leg: Edema present  Skin:     General: Skin is warm and dry  Capillary Refill: Capillary refill takes less than 2 seconds  Findings: No rash  Neurological:      Mental Status: He is alert and oriented to person, place, and time  Mental status is at baseline  GCS: GCS eye subscore is 4  GCS verbal subscore is 5  GCS motor subscore is 6  Sensory: Sensory deficit (Paraplegia with sensory loss ) present  Motor: Weakness present  Psychiatric:         Mood and Affect: Mood normal          Behavior: Behavior normal        Additional Data:     Labs:    Results from last 7 days   Lab Units 01/06/22  1057 01/06/22 0432 01/06/22 0432 01/05/22 2031 01/05/22 2031   WBC Thousand/uL  --   --  1 79*   < > 3 03*   HEMOGLOBIN g/dL  --   --  12 3   < > 11 9*   HEMATOCRIT %  --   --  38 0   < > 35 8*   PLATELETS Thousands/uL 178   < > 189   < > 190   NEUTROS PCT %  --   --   --   --  91*   LYMPHS PCT %  --   --   --   --  6*   LYMPHO PCT %  --   --  5*  --   --    MONOS PCT %  --   --   --   --  3*   MONO PCT %  --   --  9  --   --    EOS PCT %  --   --  0  --  0    < > = values in this interval not displayed       Results from last 7 days   Lab Units 01/06/22  0432   SODIUM mmol/L 141  141   POTASSIUM mmol/L 4 5  4 3   CHLORIDE mmol/L 103  104   CO2 mmol/L 28  26   BUN mg/dL 12  12   CREATININE mg/dL 0 63  0 64   ANION GAP mmol/L 10  11   CALCIUM mg/dL 8 4  8 3   ALBUMIN g/dL 3 2*  3 3*   TOTAL BILIRUBIN mg/dL 0 38  0 36   ALK PHOS U/L 53  54   ALT U/L 66  69   AST U/L 98*  94*   GLUCOSE RANDOM mg/dL 163*  164*                 Results from last 7 days   Lab Units 01/05/22  1747   LACTIC ACID mmol/L 1 2           * I Have Reviewed All Lab Data Listed Above  * Additional Pertinent Lab Tests Reviewed: [address] [facility] Admission Reviewed    Imaging:    Imaging Reports Reviewed Today Include: chest x-ray - Patchy bilateral ground-glass opacities  Recent Cultures (last 7 days):         Last 24 Hours Medication List:   Current Facility-Administered Medications   Medication Dose Route Frequency Provider Last Rate    acetaminophen  650 mg Oral Q6H PRN Cristobal Barker MD      aluminum-magnesium hydroxide-simethicone  30 mL Oral Q6H PRN Cristobal Barker MD      aspirin  81 mg Oral Daily Ian Muller MD      cefTRIAXone  1,000 mg Intravenous Q24H Cristobal Barker MD      dexamethasone  6 mg Intravenous Q24H Ian Muller MD      docusate sodium  100 mg Oral BID Cristobal Barker MD      enoxaparin  30 mg Subcutaneous Q12H [facility] Brian Jaime MD      ondansetron  4 mg Intravenous Q6H PRN Cristobal Barker MD      polyethylene glycol  17 g Oral Daily PRN MD Rajesh Boyle [START ON 1/7/2022] remdesivir  100 mg Intravenous Q24H Ian Muller MD      sodium chloride  75 mL/hr Intravenous Continuous Cristobal Barker MD 75 mL/hr (01/06/22 1057)        Today, Patient Was Seen By: Cristobal Barker MD    ** Please Note: Dictation voice to text software may have been used in the creation of this document   **

## 2022-01-06 NOTE — RESPIRATORY THERAPY NOTE
RT Protocol Note  Mary Organ 73 y o  male MRN: [id number]  Unit/Bed#: RM06 Encounter: [id number]    Assessment    Principal Problem:    COVID  Active Problems:    Paraplegia ([address] )    Skin ulcer of toe of left foot with fat layer exposed ([address] )    Skin ulcer of toe of right foot with fat layer exposed ([address] )    PAD (peripheral artery disease) ([address] )    Acute respiratory failure with hypoxia (HCC)    Elevated troponin    Acute cystitis without hematuria      Home Pulmonary Medications:  Patient does not use any home respiratory medications, no oxygen or pap therapy       Past Medical History:   Diagnosis Date    Paraplegia ([address] )     x30 years     Social History     Socioeconomic History    Marital status: /Civil Union     Spouse name: None    Number of children: None    Years of education: None    Highest education level: None   Occupational History    None   Tobacco Use    Smoking status: Never Smoker    Smokeless tobacco: Never Used   Substance and Sexual Activity    Alcohol use: None    Drug use: None    Sexual activity: None   Other Topics Concern    None   Social History Narrative    None     Social Determinants of Health     Financial Resource Strain: Not on file   Food Insecurity: Not on file   Transportation Needs: Not on file   Physical Activity: Not on file   Stress: Not on file   Social Connections: Not on file   Intimate Partner Violence: Not on file   Housing Stability: Not on file       Subjective    Subjective Data: patient is sleeping at this time in no respiratory distress    Objective    Physical Exam:   Assessment Type: Assess only  General Appearance: Sleeping  Respiratory Pattern: Normal  Chest Assessment: Chest expansion symmetrical,Trachea midline  O2 Device: 2 lpm nasal cannula    Vitals:  Blood pressure 137/73, pulse 93, temperature 98 5 °F (36 9 °C), temperature source Temporal, resp  rate 18, height 5' 4" (1 626 m), weight 76 6 kg (168 lb 14 oz), SpO2 96 %  Imaging and other studies: I have personally reviewed pertinent reports  O2 Device: 2 lpm nasal cannula     Plan       Airway Clearance Plan: Incentive Spirometer     Resp Comments: Patient received in his room patient is on 2 lpm nasal cannula, he is laying on his right side, respirations seem comfortable and not labored at this time, patient is sleeping, he has a slight snore  I have left an unopened incentive spirometer to be instructed when patient is awake to be used Q1H to help improve bronchial hygiene   Patient as per the chart, does not use any home respiratory medications, no oxygen therapy, no pap therapy

## 2022-01-06 NOTE — ASSESSMENT & PLAN NOTE
UA positive nitrites, bacteria and white blood cells  Monitor I's and O's  Continue Rocephin  Avoid nephrotoxic agents  IVF

## 2022-01-06 NOTE — ASSESSMENT & PLAN NOTE
EKG showing sinus tachycardia  Patient has no symptoms of chest pain  Continue to trend  49->85  Telemetry  Most likely due to COVID and UTI

## 2022-01-06 NOTE — H&P
[address] [facility] [address] [place]  H&P- Kike Aly 1950, 73 y o  male MRN: [id number]  Unit/Bed#: RM06 Encounter: [id number]  Primary Care Provider: No primary care provider on file  Date and time admitted to hospital: 1/5/2022  4:52 PM    * Katie Gillis 22  Patient presented with weakness, nausea, fatigue, body aches-> was found to be COVID positive on 12/26/2021  EMS recorded oxygen saturation of low 80s    ED course  Vital signs:   Tachycardic 108, respiratory rate 22, saturating 98% on 2 L NC  Labs:  Urinalysis positive for UTI, troponin 49->85  Imaging: chest x-ray pending  Meds:  Tylenol, Rocephin, Decadron, DuoNeb, Zofran, 1 L normal saline  EKG showing normal sinus rhythm    Mild COVID protocol-> currently on 2L NC  ABO  Daily CBC and CMP  Troponin continue to trend  CK  BNP  CRP  Procal  D-dimer  Decadron  Remdesivir  Respiratory protocol  Incentive spirometry  Acapella  Mucinex  Tessalon Perles  Will initiate regular DVT prophylaxis with Lovenox as D-dimer is pending        Acute respiratory failure with hypoxia (HCC)  Assessment & Plan  See mgx of COVID    Elevated troponin  Assessment & Plan  EKG showing normal sinus rhythm  Patient has no symptoms of chest pain  Continue to trend  49->85  Telemetry  Most likely due to COVID and UTI    Acute cystitis without hematuria  Assessment & Plan  UA positive nitrites, bacteria and white blood cells  Monitor I's and O's  Continue Rocephin  Avoid nephrotoxic agents  IVF    PAD (peripheral artery disease) ([facility])  Assessment & Plan  In care everywhere no history of medication  Will initiate Lipitor 40 and ASA 81  Lipid panel    Skin ulcer of toe of right foot with fat layer exposed ([address] )  Assessment & Plan  See management of skin ulcer of left foot    Skin ulcer of toe of left foot with fat layer exposed ([address] )  Assessment & Plan  Goes to wound care regularly  Vascular surgery referral was placed recently  Consult wound care    Paraplegia [facility])  Assessment & Plan  Had telephone pole fall on him and since then has had paraplegia  Does well and self caths    VTE Pharmacologic Prophylaxis: VTE Score: 7 High Risk (Score >/= 5) - Pharmacological DVT Prophylaxis Ordered: enoxaparin (Lovenox)  Sequential Compression Devices Ordered  Code Status: No Order Full code      Anticipated Length of Stay: Patient will be admitted on an inpatient basis with an anticipated length of stay of greater than 2 midnights secondary to COVID and UTI and elevated troponin  Total Time for Visit, including Counseling / Coordination of Care: 30 minutes Greater than 50% of this total time spent on direct patient counseling and coordination of care  Chief Complaint:  Weakness and shortness of breath    History of Present Illness:  Jazz Loco is a 73 y o  male with a PMH of paraplegia, PAD, and bilateral lower extremity ulcers who presents with weakness, shortness of breath, nausea, fatigue and body aches  He was tested positive with COVID on 12/26/2021  States that he had a temperature of 102° at home and has been seen his oxygen saturations in the 80s  He also admits to nausea without episodes of emesis  He needs to self cath due to paraplegia and takes laxatives for bowel movements  He denies abdominal pain, chest pain, diarrhea and urinary symptoms  Review of Systems:  Review of Systems   All other systems reviewed and are negative  Past Medical and Surgical History:   Past Medical History:   Diagnosis Date    Paraplegia ([address] )     x30 years       History reviewed  No pertinent surgical history  Meds/Allergies:  Prior to Admission medications    Not on File     I have reviewed home medications using recent Epic encounter      Allergies: No Known Allergies    Social History:  Marital Status: /Civil Union   Occupation: unknown  Patient Pre-hospital Living Situation: Home, With spouse  Patient Pre-hospital Level of Mobility: manual wheelchair  Patient Pre-hospital Diet Restrictions: none  Substance Use History:   Social History     Substance and Sexual Activity   Alcohol Use None     Social History     Tobacco Use   Smoking Status Never Smoker   Smokeless Tobacco Never Used     Social History     Substance and Sexual Activity   Drug Use Not on file       Family History:  History reviewed  No pertinent family history  Physical Exam:     Vitals:   Blood Pressure: 137/73 (01/05/22 2027)  Pulse: 98 (01/05/22 2027)  Temperature: 98 5 °F (36 9 °C) (01/05/22 2027)  Temp Source: Temporal (01/05/22 2027)  Respirations: 22 (01/05/22 2027)  Height: 5' 4" (162 6 cm) (01/05/22 1701)  Weight - Scale: 76 6 kg (168 lb 14 oz) (01/05/22 1658)  SpO2: 98 % (01/05/22 2027)    Physical Exam  Vitals and nursing note reviewed  Constitutional:       Appearance: Normal appearance  HENT:      Head: Normocephalic and atraumatic  Cardiovascular:      Rate and Rhythm: Normal rate and regular rhythm  Pulses: Normal pulses  Heart sounds: Normal heart sounds  Pulmonary:      Effort: Pulmonary effort is normal       Breath sounds: Normal breath sounds  Abdominal:      General: Abdomen is flat  Bowel sounds are normal       Palpations: Abdomen is soft  Tenderness: There is no right CVA tenderness or left CVA tenderness  Musculoskeletal:      Right lower leg: No edema  Left lower leg: No edema  Comments: bl lower extremity wasting   Skin:     General: Skin is warm  Neurological:      General: No focal deficit present  Mental Status: He is alert and oriented to person, place, and time            Additional Data:     Lab Results:  Results from last 7 days   Lab Units 01/05/22 2031   WBC Thousand/uL 3 03*   HEMOGLOBIN g/dL 11 9*   HEMATOCRIT % 35 8*   PLATELETS Thousands/uL 190   NEUTROS PCT % 91*   LYMPHS PCT % 6*   MONOS PCT % 3*   EOS PCT % 0     Results from last 7 days   Lab Units 01/05/22  1747   SODIUM mmol/L 138   POTASSIUM mmol/L 3 5   CHLORIDE mmol/L 101   CO2 mmol/L 26   BUN mg/dL 12   CREATININE mg/dL 0 67   ANION GAP mmol/L 11   CALCIUM mg/dL 8 8   ALBUMIN g/dL 3 8   TOTAL BILIRUBIN mg/dL 0 40   ALK PHOS U/L 62   ALT U/L 64   AST U/L 88*   GLUCOSE RANDOM mg/dL 82                 Results from last 7 days   Lab Units 01/05/22  1747   LACTIC ACID mmol/L 1 2       Imaging: Reviewed radiology reports from this admission including: chest xray  XR chest 1 view portable   ED Interpretation by Martin Zuñiga PA-C (01/05 1939)   Bilateral patchy infiltrates consistent COVID 19 pneumonia          EKG and Other Studies Reviewed on Admission:   Interpretation: non-specific    Rate:     ECG rate:  106    ECG rate assessment: tachycardic    Rhythm:     Rhythm: sinus tachycardia    Ectopy:     Ectopy: none    QRS:     QRS axis:  Normal    QRS intervals:  Normal  Conduction:     Conduction: normal    ST segments:     ST segments:  Normal  T waves:     T waves: normal      ** Please Note: This note has been constructed using a voice recognition system   **

## 2022-01-07 PROBLEM — N30.00 ACUTE CYSTITIS WITHOUT HEMATURIA: Status: RESOLVED | Noted: 2022-01-05 | Resolved: 2022-01-07

## 2022-01-07 LAB
ALBUMIN SERPL BCP-MCNC: 2.9 G/DL (ref 3.5–5)
ALP SERPL-CCNC: 47 U/L (ref 46–116)
ALT SERPL W P-5'-P-CCNC: 74 U/L (ref 12–78)
ANION GAP SERPL CALCULATED.3IONS-SCNC: 11 MMOL/L (ref 4–13)
APTT PPP: 39 SECONDS (ref 23–37)
AST SERPL W P-5'-P-CCNC: 83 U/L (ref 5–45)
BACTERIA UR CULT: NORMAL
BILIRUB SERPL-MCNC: 0.34 MG/DL (ref 0.2–1)
BUN SERPL-MCNC: 20 MG/DL (ref 5–25)
CALCIUM ALBUM COR SERPL-MCNC: 9 MG/DL (ref 8.3–10.1)
CALCIUM SERPL-MCNC: 8.1 MG/DL (ref 8.3–10.1)
CHLORIDE SERPL-SCNC: 106 MMOL/L (ref 100–108)
CK MB SERPL-MCNC: 8.6 NG/ML (ref 0–5)
CK MB SERPL-MCNC: <1 % (ref 0–2.5)
CK SERPL-CCNC: 736 U/L (ref 39–308)
CO2 SERPL-SCNC: 25 MMOL/L (ref 21–32)
CREAT SERPL-MCNC: 0.6 MG/DL (ref 0.6–1.3)
ERYTHROCYTE [DISTWIDTH] IN BLOOD BY AUTOMATED COUNT: 13.2 % (ref 11.6–15.1)
EST. AVERAGE GLUCOSE BLD GHB EST-MCNC: 140 MG/DL
GFR SERPL CREATININE-BSD FRML MDRD: 102 ML/MIN/1.73SQ M
GLUCOSE SERPL-MCNC: 146 MG/DL (ref 65–140)
HBA1C MFR BLD: 6.5 %
HCT VFR BLD AUTO: 36.4 % (ref 36.5–49.3)
HGB BLD-MCNC: 11.9 G/DL (ref 12–17)
INR PPP: 1.11 (ref 0.84–1.19)
MAGNESIUM SERPL-MCNC: 2.2 MG/DL (ref 1.6–2.6)
MCH RBC QN AUTO: 28.9 PG (ref 26.8–34.3)
MCHC RBC AUTO-ENTMCNC: 32.7 G/DL (ref 31.4–37.4)
MCV RBC AUTO: 88 FL (ref 82–98)
PHOSPHATE SERPL-MCNC: 3.2 MG/DL (ref 2.3–4.1)
PLATELET # BLD AUTO: 215 THOUSANDS/UL (ref 149–390)
PMV BLD AUTO: 11 FL (ref 8.9–12.7)
POTASSIUM SERPL-SCNC: 3.7 MMOL/L (ref 3.5–5.3)
PROCALCITONIN SERPL-MCNC: <0.05 NG/ML
PROT SERPL-MCNC: 6.7 G/DL (ref 6.4–8.2)
PROTHROMBIN TIME: 13.8 SECONDS (ref 11.6–14.5)
RBC # BLD AUTO: 4.12 MILLION/UL (ref 3.88–5.62)
SODIUM SERPL-SCNC: 142 MMOL/L (ref 136–145)
WBC # BLD AUTO: 3.69 THOUSAND/UL (ref 4.31–10.16)

## 2022-01-07 PROCEDURE — 85730 THROMBOPLASTIN TIME PARTIAL: CPT

## 2022-01-07 PROCEDURE — 99233 SBSQ HOSP IP/OBS HIGH 50: CPT | Performed by: INTERNAL MEDICINE

## 2022-01-07 PROCEDURE — 85027 COMPLETE CBC AUTOMATED: CPT

## 2022-01-07 PROCEDURE — 82553 CREATINE MB FRACTION: CPT | Performed by: INTERNAL MEDICINE

## 2022-01-07 PROCEDURE — 80053 COMPREHEN METABOLIC PANEL: CPT | Performed by: STUDENT IN AN ORGANIZED HEALTH CARE EDUCATION/TRAINING PROGRAM

## 2022-01-07 PROCEDURE — 82550 ASSAY OF CK (CPK): CPT | Performed by: INTERNAL MEDICINE

## 2022-01-07 PROCEDURE — 84100 ASSAY OF PHOSPHORUS: CPT

## 2022-01-07 PROCEDURE — 84145 PROCALCITONIN (PCT): CPT | Performed by: STUDENT IN AN ORGANIZED HEALTH CARE EDUCATION/TRAINING PROGRAM

## 2022-01-07 PROCEDURE — 97167 OT EVAL HIGH COMPLEX 60 MIN: CPT

## 2022-01-07 PROCEDURE — 97163 PT EVAL HIGH COMPLEX 45 MIN: CPT

## 2022-01-07 PROCEDURE — 99232 SBSQ HOSP IP/OBS MODERATE 35: CPT | Performed by: INTERNAL MEDICINE

## 2022-01-07 PROCEDURE — 83735 ASSAY OF MAGNESIUM: CPT

## 2022-01-07 PROCEDURE — 85610 PROTHROMBIN TIME: CPT

## 2022-01-07 RX ORDER — ASPIRIN 325 MG
325 TABLET ORAL DAILY
COMMUNITY
End: 2022-01-12 | Stop reason: HOSPADM

## 2022-01-07 RX ORDER — SENNA AND DOCUSATE SODIUM 50; 8.6 MG/1; MG/1
1 TABLET, FILM COATED ORAL DAILY
COMMUNITY
End: 2022-02-11

## 2022-01-07 RX ADMIN — ASPIRIN 81 MG: 81 TABLET, COATED ORAL at 10:48

## 2022-01-07 RX ADMIN — ENOXAPARIN SODIUM 30 MG: 30 INJECTION SUBCUTANEOUS at 10:48

## 2022-01-07 RX ADMIN — DEXAMETHASONE SODIUM PHOSPHATE 6 MG: 4 INJECTION, SOLUTION INTRA-ARTICULAR; INTRALESIONAL; INTRAMUSCULAR; INTRAVENOUS; SOFT TISSUE at 18:12

## 2022-01-07 RX ADMIN — REMDESIVIR 100 MG: 100 INJECTION, POWDER, LYOPHILIZED, FOR SOLUTION INTRAVENOUS at 06:32

## 2022-01-07 RX ADMIN — ENOXAPARIN SODIUM 30 MG: 30 INJECTION SUBCUTANEOUS at 23:29

## 2022-01-07 RX ADMIN — SODIUM CHLORIDE 75 ML/HR: 0.9 INJECTION, SOLUTION INTRAVENOUS at 06:37

## 2022-01-07 NOTE — PROGRESS NOTES
Progress Note - Pulmonary   Taylor Hinds 73 y o  male MRN: [id number]  Unit/Bed#: 420-[facility]:[id number]    Assessment/Plan:    1  Acute hypoxic respiratory failure   Currently requiring 3 L nasal cannula  No oxygen requirement at baseline   Titrate oxygen to maintain SpO2 greater than or equal to 88%   Pulmonary toilet:  Increase activity as tolerated, out of bed as tolerated cough and deep breathing as encouraged, incentive spirometry q 1 hour   Will eventually need home O2 eval-anticipate needing supplemental oxygen at discharge for short interval  2  COVID-19 pneumonia   Tested positive 01/05/2022, symptom onset 12/26/2021  Unvaccinated   Follow mild protocol   Continue remdesivir day#2/5   Continue Decadron 6 mg IV daily day#3/10 to total steroids  Would recommend complete 10 day course of systemic steroids, can switch to p o  Decadron at time of discharge   Not a candidate for baricitinib currently   ABX for UTI per primary team- no need for ABX from pulmonary perspective   Continue anticoagulation with prophylactic Lovenox per protocol   At discharge would recommend short course of therapeutic anticoagulation for 30 days given his risk factors for VTE including COVID-19 pneumonia, paraplegia, decreased mobility  3  Paraplegia   Noted, increased risk of VTE given decreased mobility   Venous duplex without acute or chronic DVT of lower extremities bilaterally  4  Elevated troponin   Non MI trop elevation likely secondary to COVID    EKG without ischemic changes   Further treatment per primary team     Will see again on Monday if still inpatient    Chief Complaint:    "I feel a little SOB "    Subjective:    Patient was seen examined today  No overnight events reported  Patient is seen on 3 L nasal cannula without acute distress  He does get short winded with minimal activities like moving around in bed and even sometimes with conversation  He reports significant cough    No GI are urinary complaints presently  No fevers  Objective:    Vitals: Blood pressure 121/64, pulse 85, temperature 98 °F (36 7 °C), temperature source Oral, resp  rate 20, height 5' 4" (1 626 m), weight 82 1 kg (181 lb), SpO2 (!) 87 %  3L NC,Body mass index is 31 07 kg/m²  Intake/Output Summary (Last 24 hours) at 1/7/2022 1320  Last data filed at 1/7/2022 0505  Gross per 24 hour   Intake 50 ml   Output 1750 ml   Net -1700 ml       Invasive Devices  Report    Peripheral Intravenous Line            Peripheral IV 01/05/22 Right Antecubital 1 day          Drain            Urethral Catheter Straight-tip 14 Fr  1 day                Physical Exam:     Physical Exam  Vitals and nursing note reviewed  Constitutional:       General: He is not in acute distress  Appearance: Normal appearance  HENT:      Head: Normocephalic and atraumatic  Right Ear: External ear normal       Left Ear: External ear normal       Nose: Nose normal       Mouth/Throat:      Mouth: Mucous membranes are moist       Pharynx: Oropharynx is clear  Eyes:      Extraocular Movements: Extraocular movements intact  Conjunctiva/sclera: Conjunctivae normal       Pupils: Pupils are equal, round, and reactive to light  Cardiovascular:      Rate and Rhythm: Normal rate and regular rhythm  Pulses: Normal pulses  Heart sounds: No murmur heard  Pulmonary:      Effort: Pulmonary effort is normal  No respiratory distress  Breath sounds: No wheezing, rhonchi or rales  Abdominal:      General: Bowel sounds are normal       Palpations: Abdomen is soft  There is no mass  Tenderness: There is no abdominal tenderness  Hernia: No hernia is present  Musculoskeletal:         General: No tenderness or deformity  Normal range of motion  Cervical back: Normal range of motion and neck supple  No muscular tenderness  Right lower leg: No edema  Left lower leg: No edema     Skin:     General: Skin is warm and dry  Neurological:      General: No focal deficit present  Mental Status: He is alert and oriented to person, place, and time  Mental status is at baseline  Psychiatric:         Mood and Affect: Mood normal          Behavior: Behavior normal          Thought Content: Thought content normal          Judgment: Judgment normal          Labs: I have personally reviewed pertinent lab results  , ABG: No results found for: PHART, EVE1JAA, PO2ART, IOB0TRO, V6FVOBSH, BEART, SOURCE, BNP: No results found for: BNP, CBC:   Lab Results   Component Value Date    WBC 3 69 (L) 01/07/2022    HGB 11 9 (L) 01/07/2022    HCT 36 4 (L) 01/07/2022    MCV 88 01/07/2022     01/07/2022    MCH 28 9 01/07/2022    MCHC 32 7 01/07/2022    RDW 13 2 01/07/2022    MPV 11 0 01/07/2022   , CMP:   Lab Results   Component Value Date    SODIUM 142 01/07/2022    K 3 7 01/07/2022     01/07/2022    CO2 25 01/07/2022    BUN 20 01/07/2022    CREATININE 0 60 01/07/2022    CALCIUM 8 1 (L) 01/07/2022    AST 83 (H) 01/07/2022    ALT 74 01/07/2022    ALKPHOS 47 01/07/2022    EGFR 102 01/07/2022   , PT/INR:   Lab Results   Component Value Date    INR 1 11 01/07/2022   , Troponin: No results found for: TROPONINI    Imaging and other studies: I have personally reviewed pertinent reports     and I have personally reviewed pertinent films in PACS

## 2022-01-07 NOTE — ASSESSMENT & PLAN NOTE
No O2 requirement at baseline  Home pulse Ox shows drop in SpO2 to 80s  Currently requiring 3L O2 via NC      See mgx of COVID

## 2022-01-07 NOTE — CASE MANAGEMENT
Case Management Assessment    Patient name Jacquelyn Johnson  Location [address] [id number] MRN [id number]  : 1950 Date 2022       Current Admission Date: 2022  Current Admission Diagnosis:COVID   Patient Active Problem List    Diagnosis Date Noted    Paraplegia ([facility] ) 2022    COVID 2022    Skin ulcer of toe of left foot with fat layer exposed ([facility] ) 2022    Skin ulcer of toe of right foot with fat layer exposed ([facility] ) 2022    PAD (peripheral artery disease) ([place] [id number] ) 2022    Acute respiratory failure with hypoxia ([place] [id number] ) 2022    Elevated troponin 2022    Acute cystitis without hematuria 2022      LOS (days): 1  Geometric Mean LOS (GMLOS) (days): 5 40  Days to GMLOS:4 4     OBJECTIVE:    Risk of Unplanned Readmission Score: 12         Current admission status: Inpatient       Preferred Pharmacy:   [facility], [address] ,[address] [address]  Phone: [phone] Fax: [phone]    Primary Care Provider: No primary care provider on file  Primary Insurance: MEDICARE  Secondary Insurance:     ASSESSMENT:  [address]  Representative - Spouse   Primary Phone: [phone] (Mobile)               Advance Directives  Does patient have a [address] [place] [address] Avenue?: No  Was patient offered paperwork?: No (declined)  Does patient currently have a Health Care decision maker?: Yes, please see Health Care Proxy section  Does patient have Advance Directives?: No (declined)  Was patient offered paperwork?: Yes (declined)  Primary Contact:  [id number]         Readmission Root Cause  30 Day Readmission: No    Patient Information  Admitted from[de-identified] Home  Mental Status: Alert  During Assessment patient was accompanied by: Not accompanied during assessment  Assessment information provided by[de-identified] Patient  Primary Caregiver: Self  Support Systems: Self,Spouse/significant other (pt lives with spouse but states he is independent with all care )  [place] of Residence: [address] do you live in?: [place]  Home entry access options  Select all that apply : No steps to enter home  Type of Current Residence: 2 story home  Upon entering residence, is there a bedroom on the main floor (no further steps)?: No  A bedroom is located on the following floor levels of residence (select all that apply):: 2nd Floor  Upon entering residence, is there a bathroom on the main floor (no further steps)?: No  Indicate which floors of current residence have a bathroom (select all the apply):: 2nd Floor  Number of steps to 2nd floor from main floor: One Flight (pt has chair lift made)  In the last 12 months, was there a time when you were not able to pay the mortgage or rent on time?: No  In the last 12 months, how many places have you lived?: 1  In the last 12 months, was there a time when you did not have a steady place to sleep or slept in a shelter (including now)?: No  Homeless/housing insecurity resource given?: N/A  Living Arrangements: Lives w/ Spouse/significant other  Is patient a ?: No    Activities of Daily Living Prior to Admission  Functional Status: Independent  Completes ADLs independently?: Yes  Ambulates independently?: No  Level of ambulatory dependence: Total Dependent (pt does not ambulate but self transfers to w/c)  Does patient use assisted devices?: Yes  Assisted Devices (DME) used:  Wheelchair,Stair Chair/Glide  Does patient currently own DME?: Yes  What DME does the patient currently own?: Stair Chair/Glide,Wheelchair  Does patient have a history of Outpatient Therapy (PT/OT)?: No  Does the patient have a history of Short-Term Rehab?: No  Does patient have a history of HHC?: No  Does patient currently have [address]?: No         Patient Information Continued  Income Source: SSI/SSD  Does patient have prescription coverage?: Yes  Within the past 12 months, you worried that your food would run out before you got the money to buy more : Never true  Within the past 12 months, the food you bought just didnt last and you didnt have money to get more : Never true  Food insecurity resource given?: N/A  Does patient receive dialysis treatments?: No  Does patient have a history of substance abuse?: No  Does patient have a history of Mental Health Diagnosis?: No         Means of Transportation  Means of Transport to Appts[de-identified] Drives Self  In the past 12 months, has lack of transportation kept you from medical appointments or from getting medications?: No  In the past 12 months, has lack of transportation kept you from meetings, work, or from getting things needed for daily living?: No  Was application for public transport provided?: N/A  Spoke with pt via phone due to +covid  Pt states he is independent with all his care and does not want C  Pt will need PCP on discharge  CM to follow for any additional needs to include oxygen and transport home when medically ready

## 2022-01-07 NOTE — PLAN OF CARE
Problem: OCCUPATIONAL THERAPY ADULT  Goal: Performs self-care activities at highest level of function for planned discharge setting  See evaluation for individualized goals  Description: Treatment Interventions: ADL retraining,Functional transfer training,UE strengthening/ROM,Endurance training,Patient/family training,Equipment evaluation/education,Activityengagement          See flowsheet documentation for full assessment, interventions and recommendations  Note: Limitation: Decreased ADL status,Decreased UE strength,Decreased Safe judgement during ADL,Decreased endurance,Decreased self-care trans,Decreased high-level ADLs     Assessment: Pt is a 73 y o  male seen for OT evaluation s/p admit to [facility] on 1/5/20[facility] w/ COVID  Comorbidities affecting pt's functional performance at time of assessment include: paraplegia, COVID, elevated troponin, PAD, chronic lymphedema, respiratory insufficiency  Personal factors affecting pt at time of IE include:steps to enter environment, difficulty performing ADLS, difficulty performing IADLS , limited insight into deficits and health management   Prior to admission, pt was (I) with ADLs and (A) with IADLs with use of manual w/c during mobility  Upon evaluation: Pt requires (S) level with bed mobility and declines transfers and mobility 2* the following deficits impacting occupational performance: weakness, decreased strength, decreased balance, decreased tolerance, impaired initiation, decreased safety awareness and increased pain  Pt to benefit from continued skilled OT tx while in the hospital to address deficits as defined above and maximize level of functional independence w ADL's and functional mobility  Occupational Performance areas to address include: grooming, bathing/shower, toilet hygiene, dressing, functional mobility, community mobility and clothing management   The patient's raw score on the AM-PAC Daily Activity inpatient short form is 21, standardized score is 44 27, greater than 39 4  Patients at this level are likely to benefit from discharge to home  Please refer to the recommendation of the Occupational Therapist for safe discharge planning  Co treatment with PT secondary to complex medical condition of pt, possible A of 2 required to achieve and maintain transitional movements, requiring the need of skilled therapeutic intervention of 2 therapists to achieve delivery of services       OT Discharge Recommendation: Home with home health rehabilitation

## 2022-01-07 NOTE — PHYSICAL THERAPY NOTE
Physical Therapy Evaluation    Patient Name: Tyler GONZALEZ Date: 1/7/2022     Problem List  Principal Problem:    COVID  Active Problems:    Paraplegia ([address] )    Skin ulcer of toe of left foot with fat layer exposed ([address] )    Skin ulcer of toe of right foot with fat layer exposed ([address] )    PAD (peripheral artery disease) ([address] )    Acute respiratory failure with hypoxia (HCC)    Elevated troponin    Acute cystitis without hematuria       Past Medical History  Past Medical History:   Diagnosis Date    Paraplegia ([address] )     x30 years        Past Surgical History  Past Surgical History:   Procedure Laterality Date    BACK SURGERY             01/07/22 0958   PT Last Visit   PT Visit Date 01/07/22   Note Type   Note type Evaluation   Pain Assessment   Pain Assessment Tool 0-10   Pain Score 10 - Worst Possible Pain   Pain Location/Orientation Location: Back   Restrictions/Precautions   Weight Bearing Precautions Per Order No   Other Precautions Pain; Fall Risk   Home Living   Type of [address] [place] [address] Two level;Elevator   Bathroom Shower/Tub Walk-in shower   Bathroom Toilet Standard   Bathroom Equipment Shower chair   [address]   Additional Comments pt reports use of manual lightweight w/c at baseline   Prior Function   Level of [place] Independent with ADLs and functional mobility; Needs assistance with IADLs   Lives With Spouse   Receives Help From Family   ADL Assistance Independent   IADLs Needs assistance   Falls in the last 6 months 0   Vocational On disability   General   Family/Caregiver Present No   Cognition   Overall Cognitive Status WFL   Arousal/Participation Alert   Orientation Level Oriented X4   Following Commands Follows all commands and directions without difficulty   Subjective   Subjective "I do what I want when I want"   RLE Assessment   RLE Assessment X  (0/5 d/t prior spinal injury)   LLE Assessment   LLE Assessment X  (0/5 d/t prior spinal injury)   Bed Mobility   Supine to Sit 5  Supervision   Sit to Supine 5  Supervision   Transfers   Sit to Stand Unable to assess   Stand to Sit Unable to assess   Ambulation/Elevation   Gait pattern Not appropriate; Not tested   Balance   Static Sitting Fair +   Dynamic Sitting Fair +   Static Standing Zero   Endurance Deficit   Endurance Deficit Yes   Endurance Deficit Description pt easily fatigued by sitting EOB   Activity Tolerance   Activity Tolerance Patient limited by fatigue;Patient limited by pain   Assessment   Prognosis Good   Problem List Decreased strength;Decreased endurance; Impaired balance;Decreased mobility   Assessment Patient is a 73 y o  male evaluated by Physical Therapy [facility] with admitting diagnosis of: Nausea, UTI (urinary tract infection), Weakness, Hypoxia, COVID, and principal problem of: COVID  PT was consulted to assess patient's functional mobility and discharge needs  Ordered are PT Evaluation and treatment with activity level of: up with assistance and up and OOB as tolerated  Comorbidities affecting patient's physical performance at time of assessment include: paraplegia  Personal factors affecting the patient at time of IE include: lives in 2 story home, inability to navigate community distances, inability/difficulty performing IADLs and inability/difficulty performing ADLs  Please locate objective findings from PT assessment regarding body systems outlined above  Upon evaluation, pt able to perform bed mobility with SUP but was not in agreement with attempting further transfers  Pt reports he transfers at home using sit pivot technique without sliding board, but that it has been becoming more difficult recently  It does appear that pt has an accessible home setup and is aware of his needs and limitations   PT will continue to provide intervention if pt is receptive, but anticipate pt will be safe to d/c home when medically appropriate with home health rehabilitation  The patient's AM-PAC Basic Mobility Inpatient Short Form Raw Score is 13  A Raw score of less than or equal to 16 suggests the patient may benefit from discharge to post-acute rehabilitation services  Please also refer to the recommendation of the Physical Therapist for safe discharge planning  Co treatment with OT secondary to complex medical condition of pt, possible A of 2 required to achieve and maintain transitional movements, requiring the need of skilled therapeutic intervention of 2 therapists to achieve delivery of services  Goals   Patient Goals to feel better   LTG Expiration Date 01/21/22   Long Term Goal #1 Pt will perform sit pivot transfers and bed mobility mod(I) with good safety awareness  Plan   Treatment/Interventions Functional transfer training; Endurance training;Bed mobility   PT Frequency 3-5x/wk   Recommendation   PT Discharge Recommendation Home with home health rehabilitation   [address] [place] [address] Mobility Inpatient   Turning in Bed Without Bedrails 4   Lying on Back to Sitting on Edge of Flat Bed 4   Moving Bed to Chair 2   Standing Up From Chair 1   Walk in Room 1   Climb 3-5 Stairs 1   Basic Mobility Inpatient Raw Score 13   Basic Mobility Standardized Score 33 99   Highest Level Of Mobility   JH-HL Goal 4: Move to chair/commode   JH-HLM Highest Level of Mobility 3: Sit at edge of bed   JH-HLM Goal Achieved No   End of Consult   Patient Position at End of Consult Supine; All needs within reach

## 2022-01-07 NOTE — PLAN OF CARE
Problem: PAIN - ADULT  Goal: Verbalizes/displays adequate comfort level or baseline comfort level  Description: Interventions:  - Encourage patient to monitor pain and request assistance  - Assess pain using appropriate pain scale  - Administer analgesics based on type and severity of pain and evaluate response  - Implement non-pharmacological measures as appropriate and evaluate response  - Consider cultural and social influences on pain and pain management  - Notify physician/advanced practitioner if interventions unsuccessful or patient reports new pain  Outcome: Progressing     Problem: INFECTION - ADULT  Goal: Absence or prevention of progression during hospitalization  Description: INTERVENTIONS:  - Assess and monitor for signs and symptoms of infection  - Monitor lab/diagnostic results  - Monitor all insertion sites, i e  indwelling lines, tubes, and drains  - Monitor endotracheal if appropriate and nasal secretions for changes in amount and color  - [facility] appropriate cooling/warming therapies per order  - Administer medications as ordered  - Instruct and encourage patient and family to use good hand hygiene technique  - Identify and instruct in appropriate isolation precautions for identified infection/condition  Outcome: Progressing     Problem: SAFETY ADULT  Goal: Patient will remain free of falls  Description: INTERVENTIONS:  - Educate patient/family on patient safety including physical limitations  - Instruct patient to call for assistance with activity   - Consult OT/PT to assist with strengthening/mobility   - Keep Call bell within reach  - Keep bed low and locked with side rails adjusted as appropriate  - Keep care items and personal belongings within reach  - Initiate and maintain comfort rounds  - Make Fall Risk Sign visible to staff  - Offer Toileting every 2 Hours, in advance of need  - Initiate/Maintain fall alarm  - Obtain necessary fall risk management equipment: alarm, mobility equipment  - Apply yellow socks and bracelet for high fall risk patients  - Consider moving patient to room near nurses station  Outcome: Progressing  Goal: Maintain or return to baseline ADL function  Description: INTERVENTIONS:  -  Assess patient's ability to carry out ADLs; assess patient's baseline for ADL function and identify physical deficits which impact ability to perform ADLs (bathing, care of mouth/teeth, toileting, grooming, dressing, etc )  - Assess/evaluate cause of self-care deficits   - Assess range of motion  - Assess patient's mobility; develop plan if impaired  - Assess patient's need for assistive devices and provide as appropriate  - Encourage maximum independence but intervene and supervise when necessary  - Involve family in performance of ADLs  - Assess for home care needs following discharge   - Consider OT consult to assist with ADL evaluation and planning for discharge  - Provide patient education as appropriate  Outcome: Progressing  Goal: Maintains/Returns to pre admission functional level  Description: INTERVENTIONS:  - Perform BMAT or MOVE assessment daily    - Set and communicate daily mobility goal to care team and patient/family/caregiver  - Collaborate with rehabilitation services on mobility goals if consulted  - Perform Range of Motion 4 times a day  - Reposition patient every 2 hours    - Dangle patient 3 times a day  - Record patient progress and toleration of activity level   Outcome: Progressing     Problem: DISCHARGE PLANNING  Goal: Discharge to home or other facility with appropriate resources  Description: INTERVENTIONS:  - Identify barriers to discharge w/patient and caregiver  - Arrange for needed discharge resources and transportation as appropriate  - Identify discharge learning needs (meds, wound care, etc )  - Arrange for interpretive services to assist at discharge as needed  - Refer to Case Management Department for coordinating discharge planning if the patient needs post-hospital services based on physician/advanced practitioner order or complex needs related to functional status, cognitive ability, or social support system  Outcome: Progressing     Problem: Knowledge Deficit  Goal: Patient/family/caregiver demonstrates understanding of disease process, treatment plan, medications, and discharge instructions  Description: Complete learning assessment and assess knowledge base    Interventions:  - Provide teaching at level of understanding  - Provide teaching via preferred learning methods  Outcome: Progressing     Problem: RESPIRATORY - ADULT  Goal: Achieves optimal ventilation and oxygenation  Description: INTERVENTIONS:  - Assess for changes in respiratory status  - Assess for changes in mentation and behavior  - Position to facilitate oxygenation and minimize respiratory effort  - Oxygen administered by appropriate delivery if ordered  - Initiate smoking cessation education as indicated  - Encourage broncho-pulmonary hygiene including cough, deep breathe, Incentive Spirometry  - Assess the need for suctioning and aspirate as needed  - Assess and instruct to report SOB or any respiratory difficulty  - Respiratory Therapy support as indicated  Outcome: Progressing     Problem: GENITOURINARY - ADULT  Goal: Maintains or returns to baseline urinary function  Description: INTERVENTIONS:  - Assess urinary function  - Encourage oral fluids to ensure adequate hydration if ordered  - Administer IV fluids as ordered to ensure adequate hydration  - Administer ordered medications as needed  - Offer frequent toileting  - Follow urinary retention protocol if ordered  Outcome: Progressing  Goal: Absence of urinary retention  Description: INTERVENTIONS:  - Assess patients ability to void and empty bladder  - Monitor I/O  - Bladder scan as needed  - Discuss with physician/AP medications to alleviate retention as needed  - Discuss catheterization for long term situations as appropriate  Outcome: Progressing  Goal: Urinary catheter remains patent  Description: INTERVENTIONS:  - Assess patency of urinary catheter  - If patient has a chronic palacio, consider changing catheter if non-functioning  - Follow guidelines for intermittent irrigation of non-functioning urinary catheter  Outcome: Progressing     Problem: METABOLIC, FLUID AND ELECTROLYTES - ADULT  Goal: Electrolytes maintained within normal limits  Description: INTERVENTIONS:  - Monitor labs and assess patient for signs and symptoms of electrolyte imbalances  - Administer electrolyte replacement as ordered  - Monitor response to electrolyte replacements, including repeat lab results as appropriate  - Instruct patient on fluid and nutrition as appropriate  Outcome: Progressing  Goal: Fluid balance maintained  Description: INTERVENTIONS:  - Monitor labs   - Monitor I/O and WT  - Instruct patient on fluid and nutrition as appropriate  - Assess for signs & symptoms of volume excess or deficit  Outcome: Progressing     Problem: SKIN/TISSUE INTEGRITY - ADULT  Goal: Incision(s), wounds(s) or drain site(s) healing without S/S of infection  Description: INTERVENTIONS  - Assess and document dressing, incision, wound bed, drain sites and surrounding tissue  - Provide patient and family education  - Perform skin care/dressing changes every day or as ordered  Outcome: Progressing  Goal: Pressure injury heals and does not worsen  Description: Interventions:  - Implement low air loss mattress or specialty surface (Criteria met)  - Apply silicone foam dressing  - Instruct/assist with weight shifting every 120 minutes when in chair   - Limit chair time to 6 hour intervals  - Use special pressure reducing interventions such as weight shifting when in chair   - Apply fecal or urinary incontinence containment device   - Perform passive or active ROM every day QID  - Turn and reposition patient & offload bony prominences every 2 hours   - Utilize friction reducing device or surface for transfers   - Consider consults to  interdisciplinary teams such as PT/OT, nutrition, wound care  - Use incontinent care products after each incontinent episode such as cleanser and barrier cream  - Consider nutrition services referral as needed  Outcome: Progressing     Problem: MUSCULOSKELETAL - ADULT  Goal: Maintain or return mobility to safest level of function  Description: INTERVENTIONS:  - Assess patient's ability to carry out ADLs; assess patient's baseline for ADL function and identify physical deficits which impact ability to perform ADLs (bathing, care of mouth/teeth, toileting, grooming, dressing, etc )  - Assess/evaluate cause of self-care deficits   - Assess range of motion  - Assess patient's mobility  - Assess patient's need for assistive devices and provide as appropriate  - Encourage maximum independence but intervene and supervise when necessary  - Involve family in performance of ADLs  - Assess for home care needs following discharge   - Consider OT consult to assist with ADL evaluation and planning for discharge  - Provide patient education as appropriate  Outcome: Progressing

## 2022-01-07 NOTE — ASSESSMENT & PLAN NOTE
This is a chronic stable condition    Had telephone pole fall on him and since then has had paraplegia  Does well and self caths at home  Silver in place

## 2022-01-07 NOTE — ASSESSMENT & PLAN NOTE
Followed by wound care regularly  Vascular surgery referral was placed recently  Consult wound care while hospitalized

## 2022-01-07 NOTE — ASSESSMENT & PLAN NOTE
Non MI trop elevation, Most likely due to COVID and UTI  Patient remains asymptomatic  Consider outpatient cardiology follow-up

## 2022-01-07 NOTE — OCCUPATIONAL THERAPY NOTE
Occupational Therapy Evaluation     Patient Name: Valencia Mobile  MMONL'F Date: 1/7/2022  Problem List  Principal Problem:    COVID  Active Problems:    Paraplegia ([address] )    Skin ulcer of toe of left foot with fat layer exposed ([facility] )    Skin ulcer of toe of right foot with fat layer exposed ([facility] )    PAD (peripheral artery disease) ([facility] )    Acute respiratory failure with hypoxia (HCC)    Elevated troponin    Acute cystitis without hematuria    Past Medical History  Past Medical History:   Diagnosis Date    Paraplegia ([facility] )     x30 years     Past Surgical History  Past Surgical History:   Procedure Laterality Date    BACK SURGERY               01/07/22 0959   OT Last Visit   OT Visit Date 01/07/22   Note Type   Note type Evaluation   Restrictions/Precautions   Weight Bearing Precautions Per Order No   Other Precautions Fall Risk;Pain;Multiple lines;O2   Pain Assessment   Pain Assessment Tool 0-10   Pain Score 9   Pain Location/Orientation Location: Back   Home Living   Type of [address] [place] [address] Two level;Bed/bath upstairs; Elevator  (pt reports "I built a make shift elevator")   Bathroom Shower/Tub Walk-in shower   Bathroom Toilet Standard   Bathroom Equipment Shower chair   Bathroom Accessibility Accessible   Home Equipment [address]   Additional Comments pt reports use of lightweight frame w/c during mobility    Prior Function   Level of [place] Independent with ADLs and functional mobility   [facility]   ADL Assistance Independent   IADLs Needs assistance   Falls in the last 6 months 0   Vocational On disability   Comments pt does not drive; pt is paraplegic    Psychosocial   Psychosocial (WDL) X   Patient Behaviors/Mood Flat affect;Irritable   Subjective   Subjective "I just do what I want, when I want, how I want"   ADL   Where Assessed Edge of bed   LB Dressing Assistance 5  Supervision/Setup   LB Dressing Deficit Don/doff R sock; Don/doff L sock Additional Comments pt with significant lymphedema in bilateral feet, reports worse since hospitilization   Bed Mobility   Supine to Sit 5  Supervision   Additional items Bedrails; Increased time required   Sit to Supine 5  Supervision   Additional items Bedrails; Increased time required   Additional Comments pt sits EOB for ~10 minutes with no support; able to mobilize bilateral LEs to EOB; pt with flaccid B LEs to note, and with excellent UE strength and core stability; pt on 2L O2 throughout session; SpO2 WFL with no complaints of SOB; resident present to address pt during session   Transfers   Sit to Stand Unable to assess   Stand to Sit Unable to assess   Additional Comments pt does not perform transfers and declines OOB to recliner at this time; reports history with slide board and "threw it away because I didn't like it"; reports transfers have become more difficult for him at baseline    Functional Mobility   Additional Comments utilizes manual w/c at baseline during mobility   Balance   Static Sitting Fair +   Dynamic Sitting Fair +   Static Standing Zero   Activity Tolerance   Activity Tolerance Patient limited by fatigue   RUE Assessment   RUE Assessment WFL   LUE Assessment   LUE Assessment WFL   Hand Function   Gross Motor Coordination Functional   Fine Motor Coordination Functional   Sensation   Light Touch No apparent deficits   Sharp/Dull No apparent deficits   Cognition   Overall Cognitive Status WFL   Arousal/Participation Alert   Attention Within functional limits   Orientation Level Oriented X4   Memory Within functional limits   Following Commands Follows all commands and directions without difficulty   Assessment   Limitation Decreased ADL status; Decreased UE strength;Decreased Safe judgement during ADL;Decreased endurance;Decreased self-care trans;Decreased high-level ADLs   Assessment Pt is a 73 y o  male seen for OT evaluation s/p admit to [facility] on 1/5/2022 w/ COVID    Comorbidities affecting pt's functional performance at time of assessment include: paraplegia, COVID, elevated troponin, PAD, chronic lymphedema, respiratory insufficiency  Personal factors affecting pt at time of IE include:steps to enter environment, difficulty performing ADLS, difficulty performing IADLS , limited insight into deficits and health management   Prior to admission, pt was (I) with ADLs and (A) with IADLs with use of manual w/c during mobility  Upon evaluation: Pt requires (S) level with bed mobility and declines transfers and mobility 2* the following deficits impacting occupational performance: weakness, decreased strength, decreased balance, decreased tolerance, impaired initiation, decreased safety awareness and increased pain  Pt to benefit from continued skilled OT tx while in the hospital to address deficits as defined above and maximize level of functional independence w ADL's and functional mobility  Occupational Performance areas to address include: grooming, bathing/shower, toilet hygiene, dressing, functional mobility, community mobility and clothing management  The patient's raw score on the AM-PAC Daily Activity inpatient short form is 21, standardized score is 44 27, greater than 39 4  Patients at this level are likely to benefit from discharge to home  Please refer to the recommendation of the Occupational Therapist for safe discharge planning  Co treatment with PT secondary to complex medical condition of pt, possible A of 2 required to achieve and maintain transitional movements, requiring the need of skilled therapeutic intervention of 2 therapists to achieve delivery of services     Goals   Patient Goals to go home    Short Term Goal  pt will perform functional transfers to surfaces (BSC, bed, w/c) with mod (I) level    Long Term Goal #1 pt will perform UB/LB bathing and grooming tasks seated EOB at (S) level    Long Term Goal #2 pt will perform bed mobility with mod (I) level   Plan   Treatment Interventions ADL retraining;Functional transfer training;UE strengthening/ROM; Endurance training;Patient/family training;Equipment evaluation/education; Activityengagement   Goal Expiration Date 01/21/22   OT Frequency 3-5x/wk   Recommendation   OT Discharge Recommendation Home with home health rehabilitation   AM-PAC Daily Activity Inpatient   Lower Body Dressing 3   Bathing 3   Toileting 3   Upper Body Dressing 4   Grooming 4   Eating 4   Daily Activity Raw Score 21   Daily Activity Standardized Score (Calc for Raw Score >=11) 44 27   AM-PAC Applied Cognition Inpatient   Following a Speech/Presentation 4   Understanding Ordinary Conversation 4   Taking Medications 4   Remembering Where Things Are Placed or Put Away 4   Remembering List of 4-5 Errands 4   Taking Care of Complicated Tasks 4   Applied Cognition Raw Score 24   Applied Cognition Standardized Score 62 21     Pt will benefit from continued OT services in order to maximize (I) c ADL performance and improve overall endurance/strength required to complete functional tasks in preparation for d/c  Pt left supine in bed at end of session; all needs within reach; all lines intact

## 2022-01-07 NOTE — ASSESSMENT & PLAN NOTE
UA positive nitrites, bacteria and white blood cells  Discontinue Rocephin  Avoid nephrotoxic agents  IVF  Follow Urine Cx and sensitivity

## 2022-01-07 NOTE — ASSESSMENT & PLAN NOTE
Patient presented with weakness, nausea, fatigue, body aches -> was found to be COVID positive on 12/26/2021  Wife COVID +ve on 12/25/2021  EMS recorded oxygen saturation of low 80s    CXR on 01/05: shows pathcy bilateral ground glass opacities   CRP elevate at 33; Procalcitonin elevated    Ramdesivir day#3  Dexamethasone day#3  Lovenox 30 for VTE prophylaxis  Consulted Pulmonology: recommendations highly appreciated  Patient condition worsened this morning with increased O2 requirement, 3L O2 via NC    Patient is paraplegic with peripheral edema - LE venus doppler shows no DVT   Incentive spirometry  Follow CRP  I&Os

## 2022-01-07 NOTE — ASSESSMENT & PLAN NOTE
No O2 requirement at baseline - current hypoxia is in the setting of COVID-19 infection  With increasing oxygen requirement today  See above

## 2022-01-07 NOTE — PLAN OF CARE
Problem: PHYSICAL THERAPY ADULT  Goal: Performs mobility at highest level of function for planned discharge setting  See evaluation for individualized goals  Description: Treatment/Interventions: Functional transfer training,Endurance training,Bed mobility          See flowsheet documentation for full assessment, interventions and recommendations  Note: Prognosis: Good  Problem List: Decreased strength,Decreased endurance,Impaired balance,Decreased mobility  Assessment: Patient is a 73 y o  male evaluated by Physical Therapy s/p admit to [address] [place] [address],[address] on 1/5/2022 with admitting diagnosis of: Nausea, UTI (urinary tract infection), Weakness, Hypoxia, COVID, and principal problem of: COVID  PT was consulted to assess patient's functional mobility and discharge needs  Ordered are PT Evaluation and treatment with activity level of: up with assistance and up and OOB as tolerated  Comorbidities affecting patient's physical performance at time of assessment include: paraplegia  Personal factors affecting the patient at time of IE include: lives in 2 story home, inability to navigate community distances, inability/difficulty performing IADLs and inability/difficulty performing ADLs  Please locate objective findings from PT assessment regarding body systems outlined above  Upon evaluation, pt able to perform bed mobility with SUP but was not in agreement with attempting further transfers  Pt reports he transfers at home using sit pivot technique without sliding board, but that it has been becoming more difficult recently  It does appear that pt has an accessible home setup and is aware of his needs and limitations  PT will continue to provide intervention if pt is receptive, but anticipate pt will be safe to d/c home when medically appropriate with home health rehabilitation  The patient's AM-PAC Basic Mobility Inpatient Short Form Raw Score is 13   A Raw score of less than or equal to 16 suggests the patient may benefit from discharge to post-acute rehabilitation services  Please also refer to the recommendation of the Physical Therapist for safe discharge planning  Co treatment with OT secondary to complex medical condition of pt, possible A of 2 required to achieve and maintain transitional movements, requiring the need of skilled therapeutic intervention of 2 therapists to achieve delivery of services  PT Discharge Recommendation: Home with home health rehabilitation          See flowsheet documentation for full assessment

## 2022-01-07 NOTE — ASSESSMENT & PLAN NOTE
Patient presented with weakness, nausea, fatigue, body aches  Patient is unvaccinated, and acquired COVID infection from spouse who was vaccinated and doing well at home  Reportedly  COVID positive on 12/26/2021    Initially required 2 L by nasal cannula, now up to 10 this morning  Change to moderate COVID-19 infection treatment algorithm -    · CXR 01/05 with changes consistent with COVID-19 infection  · Check daily labs  · Trend CRP - initially at 33  3  · Procalcitonin negative X 3 - hold off on antibiotics  · D-dimer 1 25 - maintain on VTE prophylaxis with SC enoxaparin  Repeat D-dimer  · Continue IV Remdesivir day #2  · Continue 6 mg IV dexamethasone daily day #2  · Initiate on Baricitinib  · Patient is paraplegic with peripheral edema - LE venus doppler shows no DVT

## 2022-01-08 PROBLEM — R74.01 TRANSAMINITIS: Status: ACTIVE | Noted: 2022-01-08

## 2022-01-08 PROBLEM — R09.02 HYPOXIA: Status: RESOLVED | Noted: 2022-01-08 | Resolved: 2022-01-08

## 2022-01-08 PROBLEM — R09.02 HYPOXIA: Status: ACTIVE | Noted: 2022-01-08

## 2022-01-08 PROBLEM — N31.9 NEUROGENIC BLADDER: Status: ACTIVE | Noted: 2022-01-08

## 2022-01-08 PROBLEM — R74.8 ELEVATED CPK: Status: ACTIVE | Noted: 2022-01-08

## 2022-01-08 PROBLEM — K59.09 OTHER CONSTIPATION: Status: ACTIVE | Noted: 2022-01-08

## 2022-01-08 PROBLEM — J12.82 PNEUMONIA DUE TO COVID-19 VIRUS: Status: ACTIVE | Noted: 2022-01-05

## 2022-01-08 LAB
ALBUMIN SERPL BCP-MCNC: 2.7 G/DL (ref 3.5–5)
ALP SERPL-CCNC: 47 U/L (ref 46–116)
ALT SERPL W P-5'-P-CCNC: 74 U/L (ref 12–78)
ANION GAP SERPL CALCULATED.3IONS-SCNC: 9 MMOL/L (ref 4–13)
AST SERPL W P-5'-P-CCNC: 72 U/L (ref 5–45)
BASOPHILS # BLD AUTO: 0 THOUSANDS/ΜL (ref 0–0.1)
BASOPHILS NFR BLD AUTO: 0 % (ref 0–1)
BILIRUB SERPL-MCNC: 0.38 MG/DL (ref 0.2–1)
BUN SERPL-MCNC: 18 MG/DL (ref 5–25)
CALCIUM ALBUM COR SERPL-MCNC: 8.8 MG/DL (ref 8.3–10.1)
CALCIUM SERPL-MCNC: 7.8 MG/DL (ref 8.3–10.1)
CHLORIDE SERPL-SCNC: 109 MMOL/L (ref 100–108)
CK MB SERPL-MCNC: 1.9 % (ref 0–2.5)
CK MB SERPL-MCNC: 8.8 NG/ML (ref 0–5)
CK SERPL-CCNC: 467 U/L (ref 39–308)
CO2 SERPL-SCNC: 24 MMOL/L (ref 21–32)
CREAT SERPL-MCNC: 0.58 MG/DL (ref 0.6–1.3)
EOSINOPHIL # BLD AUTO: 0 THOUSAND/ΜL (ref 0–0.61)
EOSINOPHIL NFR BLD AUTO: 0 % (ref 0–6)
ERYTHROCYTE [DISTWIDTH] IN BLOOD BY AUTOMATED COUNT: 13.3 % (ref 11.6–15.1)
GFR SERPL CREATININE-BSD FRML MDRD: 104 ML/MIN/1.73SQ M
GLUCOSE SERPL-MCNC: 115 MG/DL (ref 65–140)
HCT VFR BLD AUTO: 33.4 % (ref 36.5–49.3)
HGB BLD-MCNC: 10.8 G/DL (ref 12–17)
IMM GRANULOCYTES # BLD AUTO: 0.02 THOUSAND/UL (ref 0–0.2)
IMM GRANULOCYTES NFR BLD AUTO: 1 % (ref 0–2)
LYMPHOCYTES # BLD AUTO: 0.26 THOUSANDS/ΜL (ref 0.6–4.47)
LYMPHOCYTES NFR BLD AUTO: 6 % (ref 14–44)
MAGNESIUM SERPL-MCNC: 2.3 MG/DL (ref 1.6–2.6)
MCH RBC QN AUTO: 28.6 PG (ref 26.8–34.3)
MCHC RBC AUTO-ENTMCNC: 32.3 G/DL (ref 31.4–37.4)
MCV RBC AUTO: 89 FL (ref 82–98)
MONOCYTES # BLD AUTO: 0.32 THOUSAND/ΜL (ref 0.17–1.22)
MONOCYTES NFR BLD AUTO: 7 % (ref 4–12)
NEUTROPHILS # BLD AUTO: 3.8 THOUSANDS/ΜL (ref 1.85–7.62)
NEUTS SEG NFR BLD AUTO: 86 % (ref 43–75)
NRBC BLD AUTO-RTO: 0 /100 WBCS
PLATELET # BLD AUTO: 239 THOUSANDS/UL (ref 149–390)
PMV BLD AUTO: 11.1 FL (ref 8.9–12.7)
POTASSIUM SERPL-SCNC: 3.9 MMOL/L (ref 3.5–5.3)
PROT SERPL-MCNC: 6.2 G/DL (ref 6.4–8.2)
RBC # BLD AUTO: 3.77 MILLION/UL (ref 3.88–5.62)
SODIUM SERPL-SCNC: 142 MMOL/L (ref 136–145)
WBC # BLD AUTO: 4.4 THOUSAND/UL (ref 4.31–10.16)

## 2022-01-08 PROCEDURE — 82550 ASSAY OF CK (CPK): CPT | Performed by: INTERNAL MEDICINE

## 2022-01-08 PROCEDURE — 83735 ASSAY OF MAGNESIUM: CPT | Performed by: INTERNAL MEDICINE

## 2022-01-08 PROCEDURE — 82553 CREATINE MB FRACTION: CPT | Performed by: INTERNAL MEDICINE

## 2022-01-08 PROCEDURE — 85025 COMPLETE CBC W/AUTO DIFF WBC: CPT | Performed by: INTERNAL MEDICINE

## 2022-01-08 PROCEDURE — 99233 SBSQ HOSP IP/OBS HIGH 50: CPT | Performed by: INTERNAL MEDICINE

## 2022-01-08 PROCEDURE — XW0DXM6 INTRODUCTION OF BARICITINIB INTO MOUTH AND PHARYNX, EXTERNAL APPROACH, NEW TECHNOLOGY GROUP 6: ICD-10-PCS | Performed by: INTERNAL MEDICINE

## 2022-01-08 PROCEDURE — 80053 COMPREHEN METABOLIC PANEL: CPT | Performed by: STUDENT IN AN ORGANIZED HEALTH CARE EDUCATION/TRAINING PROGRAM

## 2022-01-08 RX ORDER — POLYETHYLENE GLYCOL 3350 17 G/17G
17 POWDER, FOR SOLUTION ORAL ONCE
Status: COMPLETED | OUTPATIENT
Start: 2022-01-08 | End: 2022-01-08

## 2022-01-08 RX ORDER — SENNOSIDES 8.6 MG
1 TABLET ORAL
Status: DISCONTINUED | OUTPATIENT
Start: 2022-01-08 | End: 2022-01-12 | Stop reason: HOSPADM

## 2022-01-08 RX ORDER — POTASSIUM CHLORIDE 750 MG/1
10 TABLET, EXTENDED RELEASE ORAL ONCE
Status: COMPLETED | OUTPATIENT
Start: 2022-01-08 | End: 2022-01-08

## 2022-01-08 RX ADMIN — SODIUM CHLORIDE 100 ML/HR: 0.9 INJECTION, SOLUTION INTRAVENOUS at 13:35

## 2022-01-08 RX ADMIN — ASPIRIN 81 MG: 81 TABLET, COATED ORAL at 11:38

## 2022-01-08 RX ADMIN — ENOXAPARIN SODIUM 30 MG: 30 INJECTION SUBCUTANEOUS at 11:38

## 2022-01-08 RX ADMIN — POLYETHYLENE GLYCOL 3350 17 G: 17 POWDER, FOR SOLUTION ORAL at 11:38

## 2022-01-08 RX ADMIN — POTASSIUM CHLORIDE 10 MEQ: 750 TABLET, EXTENDED RELEASE ORAL at 11:38

## 2022-01-08 RX ADMIN — ACETAMINOPHEN 650 MG: 325 TABLET, FILM COATED ORAL at 16:14

## 2022-01-08 RX ADMIN — BARICITINIB 4 MG: 2 TABLET, FILM COATED ORAL at 13:36

## 2022-01-08 RX ADMIN — REMDESIVIR 100 MG: 100 INJECTION, POWDER, LYOPHILIZED, FOR SOLUTION INTRAVENOUS at 06:25

## 2022-01-08 RX ADMIN — ENOXAPARIN SODIUM 30 MG: 30 INJECTION SUBCUTANEOUS at 23:28

## 2022-01-08 RX ADMIN — DEXAMETHASONE SODIUM PHOSPHATE 6 MG: 4 INJECTION, SOLUTION INTRA-ARTICULAR; INTRALESIONAL; INTRAMUSCULAR; INTRAVENOUS; SOFT TISSUE at 17:37

## 2022-01-08 RX ADMIN — DOCUSATE SODIUM 100 MG: 100 CAPSULE ORAL at 11:38

## 2022-01-08 NOTE — WOUND OSTOMY CARE
Consult Note - Wound   Ramin Ford 73 y o  male MRN: [id number]  Unit/Bed#: 420-01 Encounter: [id number]    History and Present Illness:  Patient admitted with Covid  Wound care consulted for area on posterior right thigh suspicious for pressure injury  Patient history significant for paraplegia as a result of an accident 30 years ago and PAD  Assessment Findings:   Patient in bed for assessment  He actively repositions himself in bed  He stated he does not have a specialty bed at home, declined specialty bed at this time, stating he moves all the time  He is awake, alert and oriented, cooperative with assessment and imaging of wound  He states he uses a shower chair at home for bowel program and to shower  He does not use a transfer board, and he has an offloading pad on his wheelchair  He self caths at home, currently he has a palacio catheter in place  Patient states he checks his skin daily and has never had a pressure wound to his buttocks or his posterior thighs  1  Bilateral lower extremity edema, heels intact  2  Sacrum and buttocks intact  3  POA-Two areas on the right upper posterior thigh, blanchable hypopigmented and hyperpigmented skin  Areas have scarring which is scattered speckling indicative of friction  Small red area to the distal scarring located in the center  Dry beefy red wound bed, no drainage, blanchable, partial thickness  Wound cleansed with NSS, Allevyn life foam dressing applied    Skin and Wound Care Plan:   1  Ehob offloading cushion to chair when OOB  2  Elevate heels off the bed with pillows   3  Accumax pump to mattress  4  Hydraguard to bilateral heels, buttocks and sacrum BID and PRN  5  Skin nourishing cream daily  6   Allevyn life silicone bordered dressing to the right lateral-posterior thigh, charley with T, date, peel back daily for skin assessment, reapply dressing, change every 3 days and PRN with soilage or dislodgment    Patient given Hydraguard with instructions for use at home to protect right posterior thigh skin    Wounds:  Wound 01/07/22 Thigh Posterior;Proximal;Right (Active)   Wound Image   01/07/22 1456   Wound Description Beefy red 01/07/22 1456   Anaya-wound Assessment Hyperpigmented;Pink;Scar Tissue 01/07/22 1456   Wound Length (cm) 0 5 cm 01/07/22 1456   Wound Width (cm) 0 7 cm 01/07/22 1456   Wound Depth (cm) 0 1 cm 01/07/22 1456   Wound Surface Area (cm^2) 0 35 cm^2 01/07/22 1456   Wound Volume (cm^3) 0 035 cm^3 01/07/22 1456   Calculated Wound Volume (cm^3) 0 04 cm^3 01/07/22 1456   Drainage Amount None 01/07/22 1456   Non-staged Wound Description Partial thickness 01/07/22 1456   Treatments Cleansed;Irrigation with NSS 01/07/22 1456   Dressing Foam, Silicon (eg   Allevyn, etc) 01/07/22 1456   Dressing Changed New 01/07/22 1456   Patient Tolerance Tolerated well 01/07/22 1456     Reviewed plan of care with primary RN  Recommendations written as orders  Wound care team to follow weekly while admitted  Questions or concerns [facility] Nurse    Rima Winter BSN, RN, [facility]

## 2022-01-08 NOTE — ASSESSMENT & PLAN NOTE
Mildly elevated CPK in setting of acute COVID-19 infection  Improving with IV fluids  Decrease IV fluid rate, and continue to monitor

## 2022-01-08 NOTE — PROGRESS NOTES
[address] [facility] [address]  Progress Note Trip Aguilera 1950, 73 y o  male MRN: [id number]  Unit/Bed#: 420-01 Encounter: [id number]  Primary Care Provider: No primary care provider on file  Date and time admitted to hospital: 1/5/2022  4:52 PM    * Pneumonia due to COVID-19 virus  Assessment & Plan  Patient presented with weakness, nausea, fatigue, body aches  Patient is unvaccinated, and acquired COVID infection from spouse who was vaccinated and doing well at home  Reportedly  COVID positive on 12/26/2021    Initially required 2 L by nasal cannula, now up to 10 this morning  Change to moderate COVID-19 infection treatment algorithm -    · CXR 01/05 with changes consistent with COVID-19 infection  · Check daily labs  · Trend CRP - initially at 33  3  · Procalcitonin negative X 3 - hold off on antibiotics  · D-dimer 1 25 - maintain on VTE prophylaxis with SC enoxaparin  Repeat D-dimer  · Continue IV Remdesivir day #2  · Continue 6 mg IV dexamethasone daily day #2  · Initiate on Baricitinib  · Patient is paraplegic with peripheral edema - LE venus doppler shows no DVT  Acute respiratory failure with hypoxia (HCC)  Assessment & Plan  No O2 requirement at baseline - current hypoxia is in the setting of COVID-19 infection  With increasing oxygen requirement today  See above  Elevated troponin  Assessment & Plan  Non MI trop elevation, Most likely due to COVID and UTI  Patient remains asymptomatic  Consider outpatient cardiology follow-up  Paraplegia [facility])  Assessment & Plan  Chronic condition secondary to previous trauma - lives at home and self caths  Neurogenic bladder  Assessment & Plan  Related to paraplegia - patient chronically self caths, currently with Silver catheter in place in the setting of acute illness  Monitor symptoms, discontinue Silver catheter soon      Skin ulcer of toe of left foot with fat layer exposed ([address] )  Assessment & Plan  Followed by wound care regularly  Vascular surgery referral was placed recently  Consult wound care while hospitalized  Skin ulcer of toe of right foot with fat layer exposed [facility])  Assessment & Plan  See management above  Other constipation  Assessment & Plan  Reports a chronic history of constipation, but refusing bowel regimen  PAD (peripheral artery disease) (HCC)  Assessment & Plan  Continue ASA 81 - does not appear to be on statin therapy chronically  Transaminitis  Assessment & Plan  Mildly elevated LFTs likely in the setting of COVID-19 infection  Appear to be improving  Continue to monitor  Elevated CPK  Assessment & Plan  Mildly elevated CPK in setting of acute COVID-19 infection  Improving with IV fluids  Decrease IV fluid rate, and continue to monitor  VTE Pharmacologic Prophylaxis: VTE Score: 7 Moderate Risk (Score 3-4) - Pharmacological DVT Prophylaxis Ordered: enoxaparin (Lovenox)  Patient Centered Rounds: I performed bedside rounds with nursing staff today  Discussions with Specialists or Other Care Team Provider: None    Education and Discussions with Family / Patient: Updated  (wife) via phone  Time Spent for Care: 30 minutes  More than 50% of total time spent on counseling and coordination of care as described above  Current Length of Stay: 2 day(s)  Current Patient Status: Inpatient   Certification Statement: The patient will continue to require additional inpatient hospital stay due to Continue treatment of COVID-19 infection, currently with worsening hypoxia  Discharge Plan: Anticipate discharge in >72 hrs to home  Code Status: Level 1 - Full Code    Subjective:   Patient reports worsening symptoms, now showing signs of worsening hypoxia with O2 increased to 10 L by nasal cannula  No overnight events reported  Remains afebrile      Objective:     Vitals:   Temp (24hrs), Av 9 °F (36 6 °C), Min:97 6 °F (36 4 °C), Max:98 2 °F (36 8 °C)    Temp:  [97 6 °F (36 4 °C)-98 2 °F (36 8 °C)] 97 6 °F (36 4 °C)  HR:  [77-89] 89  Resp:  [17-20] 18  BP: (125-152)/(68-83) 152/83  SpO2:  [87 %-94 %] 94 %  Body mass index is 31 07 kg/m²  Input and Output Summary (last 24 hours): Intake/Output Summary (Last 24 hours) at 1/8/2022 1405  Last data filed at 1/8/2022 1335  Gross per 24 hour   Intake 2118 ml   Output 2550 ml   Net -432 ml       Physical Exam:   Physical Exam  Vitals and nursing note reviewed  Constitutional:       General: He is not in acute distress  Appearance: He is well-developed  He is ill-appearing  He is not toxic-appearing  Cardiovascular:      Rate and Rhythm: Normal rate and regular rhythm  Heart sounds: No murmur heard  Pulmonary:      Effort: Pulmonary effort is normal  No respiratory distress  Breath sounds: Normal breath sounds  No wheezing, rhonchi or rales  Abdominal:      General: Abdomen is flat  Bowel sounds are normal       Palpations: Abdomen is soft  Tenderness: There is no abdominal tenderness  There is no guarding or rebound  Musculoskeletal:         General: Swelling present  No tenderness  Skin:     General: Skin is warm and dry  Neurological:      General: No focal deficit present  Mental Status: He is alert and oriented to person, place, and time     Psychiatric:         Mood and Affect: Mood normal          Behavior: Behavior normal        Additional Data:     Labs:  Results from last 7 days   Lab Units 01/08/22  0612   WBC Thousand/uL 4 40   HEMOGLOBIN g/dL 10 8*   HEMATOCRIT % 33 4*   PLATELETS Thousands/uL 239   NEUTROS PCT % 86*   LYMPHS PCT % 6*   MONOS PCT % 7   EOS PCT % 0     Results from last 7 days   Lab Units 01/08/22  0612   SODIUM mmol/L 142   POTASSIUM mmol/L 3 9   CHLORIDE mmol/L 109*   CO2 mmol/L 24   BUN mg/dL 18   CREATININE mg/dL 0 58*   ANION GAP mmol/L 9   CALCIUM mg/dL 7 8*   ALBUMIN g/dL 2 7*   TOTAL BILIRUBIN mg/dL 0 38   ALK PHOS U/L 47   ALT U/L 74   AST U/L 72*   GLUCOSE RANDOM mg/dL 115     Results from last 7 days   Lab Units 01/07/22  0546   INR  1 11         Results from last 7 days   Lab Units 01/06/22  0432   HEMOGLOBIN A1C % 6 5*     Results from last 7 days   Lab Units 01/07/22  0546 01/06/22  0445 01/06/22  0432 01/05/22  1747   LACTIC ACID mmol/L  --   --   --  1 2   PROCALCITONIN ng/ml <0 05 <0 05 <0 05  --        Lines/Drains:  Invasive Devices  Report    Peripheral Intravenous Line            Peripheral IV 01/05/22 Right Antecubital 2 days          Drain            Urethral Catheter Straight-tip 14 Fr  2 days              Urinary Catheter:  Goal for removal: N/A - Chronic Silver         Imaging: No pertinent imaging reviewed  Recent Cultures (last 7 days):   Results from last 7 days   Lab Units 01/06/22  1432   URINE CULTURE  No Growth <1000 cfu/mL       Last 24 Hours Medication List:   Current Facility-Administered Medications   Medication Dose Route Frequency Provider Last Rate    acetaminophen  650 mg Oral Q6H PRN Cristobal Barker MD      aluminum-magnesium hydroxide-simethicone  30 mL Oral Q6H PRN Cristobal Barker MD      aspirin  81 mg Oral Daily Ian Muller MD      Baricitinib  4 mg Oral Q24H Brian Jaime MD      dexamethasone  6 mg Intravenous Q24H Ian Muller MD      docusate sodium  100 mg Oral BID Cristobal Barker MD      enoxaparin  30 mg Subcutaneous Q12H [facility] Brian Jaime MD      ondansetron  4 mg Intravenous Q6H PRN Cristobal Barker MD      polyethylene glycol  17 g Oral Daily PRN Ian Muller MD      remdesivir  100 mg Intravenous Q24H aIn Muller MD      senna  1 tablet Oral HS Brian Jaime MD      sodium chloride  75 mL/hr Intravenous Continuous Brian Jaime  mL/hr (01/08/22 1335)        Today, Patient Was Seen By: Brian Jaime MD    **Please Note: This note may have been constructed using a voice recognition system  **

## 2022-01-08 NOTE — RESPIRATORY THERAPY NOTE
01/08/22 1229   Respiratory Assessment   General Appearance Alert; Awake   Respiratory Pattern Labored;Dyspnea at rest;Tachypneic;Symmetrical;Spontaneous   Chest Assessment Chest expansion symmetrical;Trachea midline   Resp Comments called to pt room pt was on 8 l/m nc and pulse ox in 80's, changed to midflow 10 l/m pulse ox 90-91%   Oxygen Therapy/Pulse Ox   O2 Device Mid flow nasal cannula   Nasal Cannula O2 Flow Rate (L/min) 10 L/min   Calculated FIO2 (%) - Nasal Cannula 60   SpO2 Activity At Rest

## 2022-01-08 NOTE — DISCHARGE INSTR - OTHER ORDERS
Skin and Wound Care Plan:   1  Ehob offloading cushion to chair when OOB  2  Elevate heels off the bed with pillows   3  Accumax pump to mattress  4  Hydraguard to bilateral heels, buttocks and sacrum BID and PRN  5  Skin nourishing cream daily  6   Allevyn life silicone bordered dressing to the right lateral-posterior thigh, charley with T, date, peel back daily for skin assessment, reapply dressing, change every 3 days and PRN with soilage or dislodgment

## 2022-01-08 NOTE — ASSESSMENT & PLAN NOTE
Mildly elevated LFTs likely in the setting of COVID-19 infection  Appear to be improving  Continue to monitor

## 2022-01-08 NOTE — ASSESSMENT & PLAN NOTE
Related to paraplegia - patient chronically self caths, currently with Silver catheter in place in the setting of acute illness  Monitor symptoms, discontinue Silver catheter soon

## 2022-01-09 ENCOUNTER — APPOINTMENT (INPATIENT)
Dept: RADIOLOGY | Facility: HOSPITAL | Age: 72
DRG: 177 | End: 2022-01-09
Payer: MEDICARE

## 2022-01-09 LAB
2HR DELTA HS TROPONIN: -234 NG/L
4HR DELTA HS TROPONIN: -163 NG/L
ALBUMIN SERPL BCP-MCNC: 2.8 G/DL (ref 3.5–5)
ALP SERPL-CCNC: 53 U/L (ref 46–116)
ALT SERPL W P-5'-P-CCNC: 106 U/L (ref 12–78)
ANION GAP SERPL CALCULATED.3IONS-SCNC: 12 MMOL/L (ref 4–13)
APTT PPP: 118 SECONDS (ref 23–37)
APTT PPP: 42 SECONDS (ref 23–37)
APTT PPP: 52 SECONDS (ref 23–37)
AST SERPL W P-5'-P-CCNC: 84 U/L (ref 5–45)
BASOPHILS # BLD MANUAL: 0 THOUSAND/UL (ref 0–0.1)
BASOPHILS NFR MAR MANUAL: 0 % (ref 0–1)
BILIRUB SERPL-MCNC: 0.52 MG/DL (ref 0.2–1)
BUN SERPL-MCNC: 16 MG/DL (ref 5–25)
CALCIUM ALBUM COR SERPL-MCNC: 8.8 MG/DL (ref 8.3–10.1)
CALCIUM SERPL-MCNC: 7.8 MG/DL (ref 8.3–10.1)
CARDIAC TROPONIN I PNL SERPL HS: 1015 NG/L
CARDIAC TROPONIN I PNL SERPL HS: 1086 NG/L
CARDIAC TROPONIN I PNL SERPL HS: 1249 NG/L
CARDIAC TROPONIN I PNL SERPL HS: 1618 NG/L (ref 8–18)
CARDIAC TROPONIN I PNL SERPL HS: 1921 NG/L (ref 8–18)
CARDIAC TROPONIN I PNL SERPL HS: 2182 NG/L (ref 8–18)
CHLORIDE SERPL-SCNC: 108 MMOL/L (ref 100–108)
CK MB SERPL-MCNC: 14.7 NG/ML (ref 0–5)
CK MB SERPL-MCNC: 3.1 % (ref 0–2.5)
CK SERPL-CCNC: 407 U/L (ref 39–308)
CO2 SERPL-SCNC: 22 MMOL/L (ref 21–32)
CREAT SERPL-MCNC: 0.57 MG/DL (ref 0.6–1.3)
CRP SERPL QL: 14.9 MG/L
D DIMER PPP FEU-MCNC: 1.01 UG/ML FEU
EOSINOPHIL # BLD MANUAL: 0 THOUSAND/UL (ref 0–0.4)
EOSINOPHIL NFR BLD MANUAL: 0 % (ref 0–6)
ERYTHROCYTE [DISTWIDTH] IN BLOOD BY AUTOMATED COUNT: 13.2 % (ref 11.6–15.1)
ERYTHROCYTE [DISTWIDTH] IN BLOOD BY AUTOMATED COUNT: 13.2 % (ref 11.6–15.1)
GFR SERPL CREATININE-BSD FRML MDRD: 104 ML/MIN/1.73SQ M
GLUCOSE SERPL-MCNC: 129 MG/DL (ref 65–140)
HCT VFR BLD AUTO: 33.4 % (ref 36.5–49.3)
HCT VFR BLD AUTO: 35.3 % (ref 36.5–49.3)
HGB BLD-MCNC: 11 G/DL (ref 12–17)
HGB BLD-MCNC: 11.4 G/DL (ref 12–17)
INR PPP: 1.28 (ref 0.84–1.19)
LG PLATELETS BLD QL SMEAR: PRESENT
LYMPHOCYTES # BLD AUTO: 0.4 THOUSAND/UL (ref 0.6–4.47)
LYMPHOCYTES # BLD AUTO: 9 % (ref 14–44)
MAGNESIUM SERPL-MCNC: 2.3 MG/DL (ref 1.6–2.6)
MCH RBC QN AUTO: 28.6 PG (ref 26.8–34.3)
MCH RBC QN AUTO: 28.9 PG (ref 26.8–34.3)
MCHC RBC AUTO-ENTMCNC: 32.3 G/DL (ref 31.4–37.4)
MCHC RBC AUTO-ENTMCNC: 32.9 G/DL (ref 31.4–37.4)
MCV RBC AUTO: 88 FL (ref 82–98)
MCV RBC AUTO: 89 FL (ref 82–98)
MONOCYTES # BLD AUTO: 0.22 THOUSAND/UL (ref 0–1.22)
MONOCYTES NFR BLD: 5 % (ref 4–12)
NEUTROPHILS # BLD MANUAL: 3.64 THOUSAND/UL (ref 1.85–7.62)
NEUTS SEG NFR BLD AUTO: 83 % (ref 43–75)
PLATELET # BLD AUTO: 255 THOUSANDS/UL (ref 149–390)
PLATELET # BLD AUTO: 269 THOUSANDS/UL (ref 149–390)
PLATELET BLD QL SMEAR: ADEQUATE
PMV BLD AUTO: 10.8 FL (ref 8.9–12.7)
PMV BLD AUTO: 11.3 FL (ref 8.9–12.7)
POTASSIUM SERPL-SCNC: 3.9 MMOL/L (ref 3.5–5.3)
PROT SERPL-MCNC: 6.4 G/DL (ref 6.4–8.2)
PROTHROMBIN TIME: 15.3 SECONDS (ref 11.6–14.5)
RBC # BLD AUTO: 3.81 MILLION/UL (ref 3.88–5.62)
RBC # BLD AUTO: 3.98 MILLION/UL (ref 3.88–5.62)
SODIUM SERPL-SCNC: 142 MMOL/L (ref 136–145)
VARIANT LYMPHS # BLD AUTO: 3 %
WBC # BLD AUTO: 3.96 THOUSAND/UL (ref 4.31–10.16)
WBC # BLD AUTO: 4.39 THOUSAND/UL (ref 4.31–10.16)

## 2022-01-09 PROCEDURE — 99233 SBSQ HOSP IP/OBS HIGH 50: CPT | Performed by: INTERNAL MEDICINE

## 2022-01-09 PROCEDURE — 85610 PROTHROMBIN TIME: CPT | Performed by: HOSPITALIST

## 2022-01-09 PROCEDURE — 85027 COMPLETE CBC AUTOMATED: CPT | Performed by: INTERNAL MEDICINE

## 2022-01-09 PROCEDURE — 94760 N-INVAS EAR/PLS OXIMETRY 1: CPT

## 2022-01-09 PROCEDURE — 94664 DEMO&/EVAL PT USE INHALER: CPT

## 2022-01-09 PROCEDURE — 82553 CREATINE MB FRACTION: CPT | Performed by: INTERNAL MEDICINE

## 2022-01-09 PROCEDURE — 80053 COMPREHEN METABOLIC PANEL: CPT | Performed by: STUDENT IN AN ORGANIZED HEALTH CARE EDUCATION/TRAINING PROGRAM

## 2022-01-09 PROCEDURE — 85730 THROMBOPLASTIN TIME PARTIAL: CPT | Performed by: HOSPITALIST

## 2022-01-09 PROCEDURE — 85730 THROMBOPLASTIN TIME PARTIAL: CPT | Performed by: INTERNAL MEDICINE

## 2022-01-09 PROCEDURE — 86140 C-REACTIVE PROTEIN: CPT | Performed by: INTERNAL MEDICINE

## 2022-01-09 PROCEDURE — 85379 FIBRIN DEGRADATION QUANT: CPT | Performed by: INTERNAL MEDICINE

## 2022-01-09 PROCEDURE — 71045 X-RAY EXAM CHEST 1 VIEW: CPT

## 2022-01-09 PROCEDURE — 84484 ASSAY OF TROPONIN QUANT: CPT | Performed by: HOSPITALIST

## 2022-01-09 PROCEDURE — 93005 ELECTROCARDIOGRAM TRACING: CPT

## 2022-01-09 PROCEDURE — 83735 ASSAY OF MAGNESIUM: CPT | Performed by: INTERNAL MEDICINE

## 2022-01-09 PROCEDURE — 85007 BL SMEAR W/DIFF WBC COUNT: CPT | Performed by: INTERNAL MEDICINE

## 2022-01-09 PROCEDURE — 85027 COMPLETE CBC AUTOMATED: CPT | Performed by: HOSPITALIST

## 2022-01-09 PROCEDURE — 82550 ASSAY OF CK (CPK): CPT | Performed by: INTERNAL MEDICINE

## 2022-01-09 PROCEDURE — 94668 MNPJ CHEST WALL SBSQ: CPT

## 2022-01-09 PROCEDURE — 84484 ASSAY OF TROPONIN QUANT: CPT | Performed by: INTERNAL MEDICINE

## 2022-01-09 RX ORDER — ASPIRIN 81 MG/1
324 TABLET, CHEWABLE ORAL ONCE
Status: COMPLETED | OUTPATIENT
Start: 2022-01-09 | End: 2022-01-09

## 2022-01-09 RX ORDER — ATORVASTATIN CALCIUM 40 MG/1
40 TABLET, FILM COATED ORAL EVERY EVENING
Status: DISCONTINUED | OUTPATIENT
Start: 2022-01-09 | End: 2022-01-12 | Stop reason: HOSPADM

## 2022-01-09 RX ORDER — HEPARIN SODIUM 1000 [USP'U]/ML
2000 INJECTION, SOLUTION INTRAVENOUS; SUBCUTANEOUS
Status: DISCONTINUED | OUTPATIENT
Start: 2022-01-09 | End: 2022-01-10

## 2022-01-09 RX ORDER — HEPARIN SODIUM 10000 [USP'U]/100ML
3-20 INJECTION, SOLUTION INTRAVENOUS
Status: DISCONTINUED | OUTPATIENT
Start: 2022-01-09 | End: 2022-01-10

## 2022-01-09 RX ORDER — NITROGLYCERIN 0.4 MG/1
TABLET SUBLINGUAL
Status: DISPENSED
Start: 2022-01-09 | End: 2022-01-09

## 2022-01-09 RX ORDER — POTASSIUM CHLORIDE 750 MG/1
10 TABLET, EXTENDED RELEASE ORAL ONCE
Status: COMPLETED | OUTPATIENT
Start: 2022-01-09 | End: 2022-01-09

## 2022-01-09 RX ORDER — NITROGLYCERIN 0.4 MG/1
0.4 TABLET SUBLINGUAL
Status: DISCONTINUED | OUTPATIENT
Start: 2022-01-09 | End: 2022-01-12 | Stop reason: HOSPADM

## 2022-01-09 RX ORDER — HEPARIN SODIUM 1000 [USP'U]/ML
4000 INJECTION, SOLUTION INTRAVENOUS; SUBCUTANEOUS
Status: DISCONTINUED | OUTPATIENT
Start: 2022-01-09 | End: 2022-01-10

## 2022-01-09 RX ADMIN — ASPIRIN 81 MG: 81 TABLET, COATED ORAL at 13:16

## 2022-01-09 RX ADMIN — SODIUM CHLORIDE 75 ML/HR: 0.9 INJECTION, SOLUTION INTRAVENOUS at 09:12

## 2022-01-09 RX ADMIN — ASPIRIN 81 MG CHEWABLE TABLET 324 MG: 81 TABLET CHEWABLE at 01:42

## 2022-01-09 RX ADMIN — NITROGLYCERIN 0.4 MG: 0.4 TABLET SUBLINGUAL at 00:12

## 2022-01-09 RX ADMIN — POTASSIUM CHLORIDE 10 MEQ: 750 TABLET, EXTENDED RELEASE ORAL at 13:16

## 2022-01-09 RX ADMIN — HEPARIN SODIUM 2000 UNITS: 1000 INJECTION INTRAVENOUS; SUBCUTANEOUS at 22:38

## 2022-01-09 RX ADMIN — HEPARIN SODIUM 12 UNITS/KG/HR: 10000 INJECTION, SOLUTION INTRAVENOUS at 01:42

## 2022-01-09 RX ADMIN — BARICITINIB 4 MG: 2 TABLET, FILM COATED ORAL at 13:21

## 2022-01-09 RX ADMIN — DEXAMETHASONE SODIUM PHOSPHATE 6 MG: 4 INJECTION, SOLUTION INTRA-ARTICULAR; INTRALESIONAL; INTRAMUSCULAR; INTRAVENOUS; SOFT TISSUE at 18:19

## 2022-01-09 RX ADMIN — METOPROLOL TARTRATE 25 MG: 25 TABLET, FILM COATED ORAL at 13:16

## 2022-01-09 RX ADMIN — REMDESIVIR 100 MG: 100 INJECTION, POWDER, LYOPHILIZED, FOR SOLUTION INTRAVENOUS at 06:26

## 2022-01-09 RX ADMIN — ATORVASTATIN CALCIUM 40 MG: 40 TABLET, FILM COATED ORAL at 18:20

## 2022-01-09 NOTE — ASSESSMENT & PLAN NOTE
Initial minimal elevation of high sensitivity troponin to a value of 85 likely represented non MI elevation in the setting of infection  Patient had been asymptomatic  Developed atypical chest pain yesterday - repeat troponin elevated at 1,618  ECG with NSR, downward deflecting Q-waves in 1/aVL, 2/3/AVF, and V5/V6 - most of which look essentially unchanged from time of admission, and without any ST segment abnormalities  · Heparin gtt  · Daily ASA  · High potency statin  · Low-dose BB therapy  · Trended troponin 85 --> 1,618, 2,182  Now 1,249 with patient chest pain-free  · Discussed case with Cardiology by tiger text - believes this to be non MI elevation in troponin in the setting of worsening hypoxia and COVID-19 infection  Agree with plans as above, with no need to transfer for further evaluation, treatment  Will continue heparin drip for another 24-48 hours, and continue to evaluate patient's symptoms  Will need outpatient cardiology follow-up for further ischemic evaluation

## 2022-01-09 NOTE — ASSESSMENT & PLAN NOTE
Mildly elevated CPK in setting of acute COVID-19 infection  Improving with IV fluids  Current value of 407 down from peak of 917  Discontinue IV fluids

## 2022-01-09 NOTE — PLAN OF CARE
Problem: PAIN - ADULT  Goal: Verbalizes/displays adequate comfort level or baseline comfort level  Description: Interventions:  - Encourage patient to monitor pain and request assistance  - Assess pain using appropriate pain scale  - Administer analgesics based on type and severity of pain and evaluate response  - Implement non-pharmacological measures as appropriate and evaluate response  - Consider cultural and social influences on pain and pain management  - Notify physician/advanced practitioner if interventions unsuccessful or patient reports new pain  Outcome: Progressing     Problem: INFECTION - ADULT  Goal: Absence or prevention of progression during hospitalization  Description: INTERVENTIONS:  - Assess and monitor for signs and symptoms of infection  - Monitor lab/diagnostic results  - Monitor all insertion sites, i e  indwelling lines, tubes, and drains  - Monitor endotracheal if appropriate and nasal secretions for changes in amount and color  - [place] appropriate cooling/warming therapies per order  - Administer medications as ordered  - Instruct and encourage patient and family to use good hand hygiene technique  - Identify and instruct in appropriate isolation precautions for identified infection/condition  Outcome: Progressing     Problem: SAFETY ADULT  Goal: Patient will remain free of falls  Description: INTERVENTIONS:  - Educate patient/family on patient safety including physical limitations  - Instruct patient to call for assistance with activity   - Consult OT/PT to assist with strengthening/mobility   - Keep Call bell within reach  - Keep bed low and locked with side rails adjusted as appropriate  - Keep care items and personal belongings within reach  - Initiate and maintain comfort rounds  - Make Fall Risk Sign visible to staff  - Offer Toileting every 2 Hours, in advance of need  - Initiate/Maintain fall alarm  - Obtain necessary fall risk management equipment: alarm, mobility equipment  - Apply yellow socks and bracelet for high fall risk patients  - Consider moving patient to room near nurses station  Outcome: Progressing  Goal: Maintain or return to baseline ADL function  Description: INTERVENTIONS:  -  Assess patient's ability to carry out ADLs; assess patient's baseline for ADL function and identify physical deficits which impact ability to perform ADLs (bathing, care of mouth/teeth, toileting, grooming, dressing, etc )  - Assess/evaluate cause of self-care deficits   - Assess range of motion  - Assess patient's mobility; develop plan if impaired  - Assess patient's need for assistive devices and provide as appropriate  - Encourage maximum independence but intervene and supervise when necessary  - Involve family in performance of ADLs  - Assess for home care needs following discharge   - Consider OT consult to assist with ADL evaluation and planning for discharge  - Provide patient education as appropriate  Outcome: Progressing  Goal: Maintains/Returns to pre admission functional level  Description: INTERVENTIONS:  - Perform BMAT or MOVE assessment daily    - Set and communicate daily mobility goal to care team and patient/family/caregiver  - Collaborate with rehabilitation services on mobility goals if consulted  - Perform Range of Motion 4 times a day  - Reposition patient every 2 hours    - Dangle patient 3 times a day  - Record patient progress and toleration of activity level   Outcome: Progressing     Problem: DISCHARGE PLANNING  Goal: Discharge to home or other facility with appropriate resources  Description: INTERVENTIONS:  - Identify barriers to discharge w/patient and caregiver  - Arrange for needed discharge resources and transportation as appropriate  - Identify discharge learning needs (meds, wound care, etc )  - Arrange for interpretive services to assist at discharge as needed  - Refer to Case Management Department for coordinating discharge planning if the patient needs post-hospital services based on physician/advanced practitioner order or complex needs related to functional status, cognitive ability, or social support system  Outcome: Progressing     Problem: Knowledge Deficit  Goal: Patient/family/caregiver demonstrates understanding of disease process, treatment plan, medications, and discharge instructions  Description: Complete learning assessment and assess knowledge base    Interventions:  - Provide teaching at level of understanding  - Provide teaching via preferred learning methods  Outcome: Progressing     Problem: RESPIRATORY - ADULT  Goal: Achieves optimal ventilation and oxygenation  Description: INTERVENTIONS:  - Assess for changes in respiratory status  - Assess for changes in mentation and behavior  - Position to facilitate oxygenation and minimize respiratory effort  - Oxygen administered by appropriate delivery if ordered  - Initiate smoking cessation education as indicated  - Encourage broncho-pulmonary hygiene including cough, deep breathe, Incentive Spirometry  - Assess the need for suctioning and aspirate as needed  - Assess and instruct to report SOB or any respiratory difficulty  - Respiratory Therapy support as indicated  Outcome: Progressing     Problem: GENITOURINARY - ADULT  Goal: Maintains or returns to baseline urinary function  Description: INTERVENTIONS:  - Assess urinary function  - Encourage oral fluids to ensure adequate hydration if ordered  - Administer IV fluids as ordered to ensure adequate hydration  - Administer ordered medications as needed  - Offer frequent toileting  - Follow urinary retention protocol if ordered  Outcome: Progressing  Goal: Absence of urinary retention  Description: INTERVENTIONS:  - Assess patients ability to void and empty bladder  - Monitor I/O  - Bladder scan as needed  - Discuss with physician/AP medications to alleviate retention as needed  - Discuss catheterization for long term situations as appropriate  Outcome: Progressing  Goal: Urinary catheter remains patent  Description: INTERVENTIONS:  - Assess patency of urinary catheter  - If patient has a chronic palacio, consider changing catheter if non-functioning  - Follow guidelines for intermittent irrigation of non-functioning urinary catheter  Outcome: Progressing     Problem: METABOLIC, FLUID AND ELECTROLYTES - ADULT  Goal: Electrolytes maintained within normal limits  Description: INTERVENTIONS:  - Monitor labs and assess patient for signs and symptoms of electrolyte imbalances  - Administer electrolyte replacement as ordered  - Monitor response to electrolyte replacements, including repeat lab results as appropriate  - Instruct patient on fluid and nutrition as appropriate  Outcome: Progressing  Goal: Fluid balance maintained  Description: INTERVENTIONS:  - Monitor labs   - Monitor I/O and WT  - Instruct patient on fluid and nutrition as appropriate  - Assess for signs & symptoms of volume excess or deficit  Outcome: Progressing     Problem: SKIN/TISSUE INTEGRITY - ADULT  Goal: Incision(s), wounds(s) or drain site(s) healing without S/S of infection  Description: INTERVENTIONS  - Assess and document dressing, incision, wound bed, drain sites and surrounding tissue  - Provide patient and family education  - Perform skin care/dressing changes every day or as ordered  Outcome: Progressing  Goal: Pressure injury heals and does not worsen  Description: Interventions:  - Implement low air loss mattress or specialty surface (Criteria met)  - Apply silicone foam dressing  - Instruct/assist with weight shifting every 120 minutes when in chair   - Limit chair time to 6 hour intervals  - Use special pressure reducing interventions such as weight shifting when in chair   - Apply fecal or urinary incontinence containment device   - Perform passive or active ROM every day QID  - Turn and reposition patient & offload bony prominences every 2 hours   - Utilize friction reducing device or surface for transfers   - Consider consults to  interdisciplinary teams such as PT/OT, nutrition, wound care  - Use incontinent care products after each incontinent episode such as cleanser and barrier cream  - Consider nutrition services referral as needed  Outcome: Progressing     Problem: MUSCULOSKELETAL - ADULT  Goal: Maintain or return mobility to safest level of function  Description: INTERVENTIONS:  - Assess patient's ability to carry out ADLs; assess patient's baseline for ADL function and identify physical deficits which impact ability to perform ADLs (bathing, care of mouth/teeth, toileting, grooming, dressing, etc )  - Assess/evaluate cause of self-care deficits   - Assess range of motion  - Assess patient's mobility  - Assess patient's need for assistive devices and provide as appropriate  - Encourage maximum independence but intervene and supervise when necessary  - Involve family in performance of ADLs  - Assess for home care needs following discharge   - Consider OT consult to assist with ADL evaluation and planning for discharge  - Provide patient education as appropriate  Outcome: Progressing     Problem: Nutrition/Hydration-ADULT  Goal: Nutrient/Hydration intake appropriate for improving, restoring or maintaining nutritional needs  Description: Monitor and assess patient's nutrition/hydration status for malnutrition  Collaborate with interdisciplinary team and initiate plan and interventions as ordered  Monitor patient's weight and dietary intake as ordered or per policy  Utilize nutrition screening tool and intervene as necessary  Determine patient's food preferences and provide high-protein, high-caloric foods as appropriate       INTERVENTIONS:  - Monitor oral intake, urinary output, labs, and treatment plans  - Assess nutrition and hydration status and recommend course of action  - Evaluate amount of meals eaten  - Assist patient with eating if necessary   - Allow adequate time for meals  - Recommend/ encourage appropriate diets, oral nutritional supplements, and vitamin/mineral supplements  - Order, calculate, and assess calorie counts as needed  - Recommend, monitor, and adjust tube feedings and TPN/PPN based on assessed needs  - Assess need for intravenous fluids  - Provide specific nutrition/hydration education as appropriate  - Include patient/family/caregiver in decisions related to nutrition  Outcome: Progressing     Problem: MOBILITY - ADULT  Goal: Maintain or return to baseline ADL function  Description: INTERVENTIONS:  -  Assess patient's ability to carry out ADLs; assess patient's baseline for ADL function and identify physical deficits which impact ability to perform ADLs (bathing, care of mouth/teeth, toileting, grooming, dressing, etc )  - Assess/evaluate cause of self-care deficits   - Assess range of motion  - Assess patient's mobility; develop plan if impaired  - Assess patient's need for assistive devices and provide as appropriate  - Encourage maximum independence but intervene and supervise when necessary  - Involve family in performance of ADLs  - Assess for home care needs following discharge   - Consider OT consult to assist with ADL evaluation and planning for discharge  - Provide patient education as appropriate  Outcome: Progressing  Goal: Maintains/Returns to pre admission functional level  Description: INTERVENTIONS:  - Perform BMAT or MOVE assessment daily    - Set and communicate daily mobility goal to care team and patient/family/caregiver  - Collaborate with rehabilitation services on mobility goals if consulted  - Perform Range of Motion 4 times a day  - Reposition patient every 2 hours    - Dangle patient 3 times a day  - Record patient progress and toleration of activity level   Outcome: Progressing     Problem: Potential for Falls  Goal: Patient will remain free of falls  Description: INTERVENTIONS:  - Educate patient/family on patient safety including physical limitations  - Instruct patient to call for assistance with activity   - Consult OT/PT to assist with strengthening/mobility   - Keep Call bell within reach  - Keep bed low and locked with side rails adjusted as appropriate  - Keep care items and personal belongings within reach  - Initiate and maintain comfort rounds  - Make Fall Risk Sign visible to staff  - Offer Toileting every 2 Hours, in advance of need  - Initiate/Maintain fall alarm  - Obtain necessary fall risk management equipment: alarm, mobility equipment  - Apply yellow socks and bracelet for high fall risk patients  - Consider moving patient to room near nurses station  Outcome: Progressing     Problem: Prexisting or High Potential for Compromised Skin Integrity  Goal: Skin integrity is maintained or improved  Description: INTERVENTIONS:  - Identify patients at risk for skin breakdown  - Assess and monitor skin integrity  - Assess and monitor nutrition and hydration status  - Monitor labs   - Assess for incontinence   - Turn and reposition patient  - Assist with mobility/ambulation  - Relieve pressure over bony prominences  - Avoid friction and shearing  - Provide appropriate hygiene as needed including keeping skin clean and dry  - Evaluate need for skin moisturizer/barrier cream  - Collaborate with interdisciplinary team   - Patient/family teaching  - Consider wound care consult   Outcome: Progressing

## 2022-01-09 NOTE — PROGRESS NOTES
[facility] [address]  Progress Note Carole Downey 1950, 73 y o  male MRN: [id number]  Unit/Bed#: 420-01 Encounter: [id number]  Primary Care Provider: No primary care provider on file  Date and time admitted to hospital: 1/5/2022  4:52 PM    * Pneumonia due to COVID-19 virus  Assessment & Plan  Patient presented with weakness, nausea, fatigue, body aches  Patient is unvaccinated, and acquired COVID infection from spouse who was vaccinated and doing well at home  Reportedly  COVID positive on 12/26/2021  Initially required 2 L by nasal cannula, up to 10L again this morning  Currently being maintained on the moderate COVID-19 infection treatment algorithm -    · CXR 01/05 with changes consistent with COVID-19 infection  · Check daily labs  · Trend CRP - initially at 33 3 --> 14 9  · Procalcitonin negative X 3 - hold off on antibiotics  · D-dimer 1 25 --> 1 01 - Currently on Heparin gtt due to elevated troponin  · Continue IV Remdesivir day #3  · Continue 6 mg IV dexamethasone daily day #3  · Continue on Baricitinib day#2  · Patient is paraplegic with peripheral edema - LE venus doppler shows no DVT  Acute respiratory failure with hypoxia (HCC)  Assessment & Plan  No O2 requirement at baseline - current hypoxia is in the setting of COVID-19 infection  With increasing oxygen requirement today  See above  Elevated troponin  Assessment & Plan  Initial minimal elevation of high sensitivity troponin to a value of 85 likely represented non MI elevation in the setting of infection  Patient had been asymptomatic  Developed atypical chest pain yesterday - repeat troponin elevated at 1,618  ECG with NSR, downward deflecting Q-waves in 1/aVL, 2/3/AVF, and V5/V6 - most of which look essentially unchanged from time of admission, and without any ST segment abnormalities  · Heparin gtt  · Daily ASA  · High potency statin  · Low-dose BB therapy    · Trended troponin 85 --> 1,618, 2,182  Now 1,249 with patient chest pain-free  · Discussed case with Cardiology by tiger text - believes this to be non MI elevation in troponin in the setting of worsening hypoxia and COVID-19 infection  Agree with plans as above, with no need to transfer for further evaluation, treatment  Will continue heparin drip for another 24-48 hours, and continue to evaluate patient's symptoms  Will need outpatient cardiology follow-up for further ischemic evaluation  Paraplegia [facility])  Assessment & Plan  Chronic condition secondary to previous trauma - lives at home and self caths  Neurogenic bladder  Assessment & Plan  Related to paraplegia - patient chronically self caths, currently with Silver catheter in place in the setting of acute illness  Monitor symptoms, discontinue Silver catheter soon  Skin ulcer of toe of left foot with fat layer exposed ([address] )  Assessment & Plan  Followed by wound care regularly  Vascular surgery referral was placed recently  Consult wound care while hospitalized  Skin ulcer of toe of right foot with fat layer exposed [facility])  Assessment & Plan  See management above  Other constipation  Assessment & Plan  Reports a chronic history of constipation, but refusing bowel regimen  PAD (peripheral artery disease) ([facility])  Assessment & Plan  Continue ASA 81 - does not appear to be on statin therapy chronically  Started on atorvastatin given rise in troponin levels  Transaminitis  Assessment & Plan  Mildly elevated LFTs likely in the setting of COVID-19 infection  Continue to monitor  Elevated CPK  Assessment & Plan  Mildly elevated CPK in setting of acute COVID-19 infection  Improving with IV fluids  Current value of 407 down from peak of 917  Discontinue IV fluids        VTE Pharmacologic Prophylaxis: VTE Score: 7 Moderate Risk (Score 3-4) - Pharmacological DVT Prophylaxis Ordered: enoxaparin (Lovenox)      Patient Centered Rounds: I performed bedside rounds with nursing staff today  Discussions with Specialists or Other Care Team Provider: None     Education and Discussions with Family / Patient: Updated  (wife) via phone      Time Spent for Care: 30 minutes  More than 50% of total time spent on counseling and coordination of care as described above      Current Length of Stay: 2 day(s)  Current Patient Status: Inpatient   Certification Statement: The patient will continue to require additional inpatient hospital stay due to Continue treatment of COVID-19 infection, currently with worsening hypoxia  Discharge Plan: Anticipate discharge in >72 hrs to home      Code Status: Level 1 - Full Code    Subjective:   Patient seen and examined - reports essential resolution of atypical chest pain from yesterday  Troponin did elevate from time of admission, but is now down trending with treatment  Continued hypoxia but with unchanged O2 requirement at 10 L by mid flow  No overnight events reported  Remains afebrile P    Objective:     Vitals:   Temp (24hrs), Av 7 °F (36 5 °C), Min:97 6 °F (36 4 °C), Max:97 9 °F (36 6 °C)    Temp:  [97 6 °F (36 4 °C)-97 9 °F (36 6 °C)] 97 9 °F (36 6 °C)  HR:  [76-89] 78  Resp:  [18-28] 28  BP: (136-152)/(78-83) 146/78  SpO2:  [88 %-94 %] 94 %  Body mass index is 31 07 kg/m²  Input and Output Summary (last 24 hours): Intake/Output Summary (Last 24 hours) at 2022 1330  Last data filed at 2022 5302  Gross per 24 hour   Intake 3513 ml   Output 2425 ml   Net 1088 ml       Physical Exam:   Vitals and nursing note reviewed  Constitutional:       General: He is not in acute distress  Appearance: He is well-developed  He is ill-appearing  He is not toxic-appearing  Cardiovascular:      Rate and Rhythm: Normal rate and regular rhythm  Heart sounds: No murmur heard  Pulmonary:      Effort: Pulmonary effort is normal  No respiratory distress  Breath sounds: Normal breath sounds   No wheezing, rhonchi or rales    Abdominal:      General: Abdomen is flat  Bowel sounds are normal       Palpations: Abdomen is soft  Tenderness: There is no abdominal tenderness  There is no guarding or rebound  Musculoskeletal:         General: Swelling present and reportedly chronic  No tenderness  Skin:     General: Skin is warm and dry  Neurological:      General: No focal deficit present  Mental Status: He is alert and oriented to person, place, and time  Psychiatric:         Mood and Affect: Mood normal          Behavior: Behavior normal      Additional Data:     Labs:  Results from last 7 days   Lab Units 01/09/22  0617 01/09/22  0120 01/08/22  0612   WBC Thousand/uL 4 39   < > 4 40   HEMOGLOBIN g/dL 11 4*   < > 10 8*   HEMATOCRIT % 35 3*   < > 33 4*   PLATELETS Thousands/uL 269   < > 239   NEUTROS PCT %  --   --  86*   LYMPHS PCT %  --   --  6*   LYMPHO PCT % 9*  --   --    MONOS PCT %  --   --  7   MONO PCT % 5  --   --    EOS PCT % 0  --  0    < > = values in this interval not displayed  Results from last 7 days   Lab Units 01/09/22  0617   SODIUM mmol/L 142   POTASSIUM mmol/L 3 9   CHLORIDE mmol/L 108   CO2 mmol/L 22   BUN mg/dL 16   CREATININE mg/dL 0 57*   ANION GAP mmol/L 12   CALCIUM mg/dL 7 8*   ALBUMIN g/dL 2 8*   TOTAL BILIRUBIN mg/dL 0 52   ALK PHOS U/L 53   ALT U/L 106*   AST U/L 84*   GLUCOSE RANDOM mg/dL 129     Results from last 7 days   Lab Units 01/09/22  0120   INR  1 28*         Results from last 7 days   Lab Units 01/06/22  0432   HEMOGLOBIN A1C % 6 5*     Results from last 7 days   Lab Units 01/07/22  0546 01/06/22  0445 01/06/22  0432 01/05/22  1747   LACTIC ACID mmol/L  --   --   --  1 2   PROCALCITONIN ng/ml <0 05 <0 05 <0 05  --        Lines/Drains:  Invasive Devices  Report    Peripheral Intravenous Line            Peripheral IV 01/08/22 Dorsal (posterior); Right Hand 1 day    Peripheral IV 01/09/22 Left;Ventral (anterior) Forearm <1 day          Drain            Urethral Catheter Straight-tip 14 Fr  3 days              Imaging: CXR 1/9/2022    Recent Cultures (last 7 days):   Results from last 7 days   Lab Units 01/06/22  1432   URINE CULTURE  No Growth <1000 cfu/mL       Last 24 Hours Medication List:   Current Facility-Administered Medications   Medication Dose Route Frequency Provider Last Rate    acetaminophen  650 mg Oral Q6H PRN Adela Tineo MD      aluminum-magnesium hydroxide-simethicone  30 mL Oral Q6H PRN Adela Tineo MD      aspirin  81 mg Oral Daily Rekha Bell MD      atorvastatin  40 mg Oral QPM Berny Carmona MD      Baricitinib  4 mg Oral Q24H Berny Carmona MD      dexamethasone  6 mg Intravenous Q24H Rekha Bell MD      docusate sodium  100 mg Oral BID Adela Tineo MD      heparin (porcine)  3-20 Units/kg/hr (Order-Specific) Intravenous Titrated Alana Medrano MD 12 Units/kg/hr (01/09/22 0142)    heparin (porcine)  2,000 Units Intravenous Q1H PRN Alana Medrano MD      heparin (porcine)  4,000 Units Intravenous Q1H PRN Alana Medrano MD      metoprolol tartrate  25 mg Oral Q12H [address] Berny Carmona MD      nitroglycerin  0 4 mg Sublingual Q5 Min PRN Alana Medrano MD      ondansetron  4 mg Intravenous Q6H PRN Adela Tineo MD      polyethylene glycol  17 g Oral Daily PRN Rekha Bell MD      remdesivir  100 mg Intravenous Q24H Rekha Bell MD      senna  1 tablet Oral HS Berny Carmona MD          Today, Patient Was Seen By: Berny Carmona MD    **Please Note: This note may have been constructed using a voice recognition system  **

## 2022-01-09 NOTE — RESPIRATORY THERAPY NOTE
01/09/22 0155   Non-Invasive Information   O2 Interface Device MFNC prongs   Non-Invasive Ventilation Mode MFNC (Mid flow)   SpO2 94 %   $ Pulse Oximetry Spot Check Charge Completed   Non-Invasive Settings   Flow (lpm) 10

## 2022-01-09 NOTE — NURSING NOTE
During assessment pt C/O chest pain 10/10 Vs obtained  T97 7, Hr 890 R20, /81, O2 sat 88% on mid-flow 10L Via N/C provider made aware of pt's condition nitro tab ordered one administered SL chest pain improved to 3/10  Troponin level and PTT, troponin 1681and PTT 42  Aspirin 324mg and Heparin gtt at 12u/kg ordered and administered  Pt is stable no S/S of distress at this time

## 2022-01-09 NOTE — ASSESSMENT & PLAN NOTE
Patient presented with weakness, nausea, fatigue, body aches  Patient is unvaccinated, and acquired COVID infection from spouse who was vaccinated and doing well at home  Reportedly  COVID positive on 12/26/2021  Initially required 2 L by nasal cannula, up to 10L again this morning  Currently being maintained on the moderate COVID-19 infection treatment algorithm -    · CXR 01/05 with changes consistent with COVID-19 infection  · Check daily labs  · Trend CRP - initially at 33 3 --> 14 9  · Procalcitonin negative X 3 - hold off on antibiotics  · D-dimer 1 25 --> 1 01 - Currently on Heparin gtt due to elevated troponin  · Continue IV Remdesivir day #3  · Continue 6 mg IV dexamethasone daily day #3  · Continue on Baricitinib day#2  · Patient is paraplegic with peripheral edema - LE venus doppler shows no DVT

## 2022-01-09 NOTE — ASSESSMENT & PLAN NOTE
Continue ASA 81 - does not appear to be on statin therapy chronically  Started on atorvastatin given rise in troponin levels

## 2022-01-09 NOTE — QUICK NOTE
Patient started to complain on atypical chest pain, EKG showed sinus rhythm ,no ST changes, troponin 1618  Patient started on heparin drip   Cardiology consult ,continue to trend troponin    Echo ordered

## 2022-01-09 NOTE — ASSESSMENT & PLAN NOTE
Initial minimal elevation of high sensitivity troponin to a value of 85 likely represented non MI elevation in the setting of infection  Patient had been asymptomatic  Developed atypical chest pain on 01/08 - repeat troponin elevated at 1,618  ECG with NSR, downward deflecting Q-waves in 1/aVL, 2/3/AVF, and V5/V6 - most of which look essentially unchanged from time of admission, and without any ST segment abnormalities  · Heparin gtt X 48 hours - will discontinue now  · Initiated on daily ASA and high potency statin  · Was also started on low-dose BB therapy  · Trended troponin 85 --> 1,618, 2,182  Now 1,249 with patient chest pain-free  · Check ECHO - ordered and pending  · Discontinue telemetry  · Discussed case with Cardiology by tiger text previously - believes this to be non MI elevation in troponin in the setting of worsening hypoxia and COVID-19 infection  Agree with plans as above, with no need to transfer for further evaluation, treatment  Will need outpatient cardiology follow-up for further ischemic evaluation

## 2022-01-10 ENCOUNTER — APPOINTMENT (INPATIENT)
Dept: NON INVASIVE DIAGNOSTICS | Facility: HOSPITAL | Age: 72
DRG: 177 | End: 2022-01-10
Payer: MEDICARE

## 2022-01-10 LAB
ALBUMIN SERPL BCP-MCNC: 2.6 G/DL (ref 3.5–5)
ALP SERPL-CCNC: 51 U/L (ref 46–116)
ALT SERPL W P-5'-P-CCNC: 88 U/L (ref 12–78)
ANION GAP SERPL CALCULATED.3IONS-SCNC: 7 MMOL/L (ref 4–13)
APTT PPP: 65 SECONDS (ref 23–37)
APTT PPP: 83 SECONDS (ref 23–37)
AST SERPL W P-5'-P-CCNC: 61 U/L (ref 5–45)
ATRIAL RATE: 67 BPM
BASOPHILS # BLD MANUAL: 0 THOUSAND/UL (ref 0–0.1)
BASOPHILS NFR MAR MANUAL: 0 % (ref 0–1)
BILIRUB SERPL-MCNC: 0.52 MG/DL (ref 0.2–1)
BUN SERPL-MCNC: 15 MG/DL (ref 5–25)
CALCIUM ALBUM COR SERPL-MCNC: 9 MG/DL (ref 8.3–10.1)
CALCIUM SERPL-MCNC: 7.9 MG/DL (ref 8.3–10.1)
CHLORIDE SERPL-SCNC: 108 MMOL/L (ref 100–108)
CK MB SERPL-MCNC: 2.9 % (ref 0–2.5)
CK MB SERPL-MCNC: 6.4 NG/ML (ref 0–5)
CK SERPL-CCNC: 218 U/L (ref 39–308)
CO2 SERPL-SCNC: 25 MMOL/L (ref 21–32)
CREAT SERPL-MCNC: 0.53 MG/DL (ref 0.6–1.3)
EOSINOPHIL # BLD MANUAL: 0 THOUSAND/UL (ref 0–0.4)
EOSINOPHIL NFR BLD MANUAL: 0 % (ref 0–6)
ERYTHROCYTE [DISTWIDTH] IN BLOOD BY AUTOMATED COUNT: 13.2 % (ref 11.6–15.1)
GFR SERPL CREATININE-BSD FRML MDRD: 107 ML/MIN/1.73SQ M
GLUCOSE SERPL-MCNC: 153 MG/DL (ref 65–140)
HCT VFR BLD AUTO: 32.7 % (ref 36.5–49.3)
HGB BLD-MCNC: 10.8 G/DL (ref 12–17)
LG PLATELETS BLD QL SMEAR: PRESENT
LYMPHOCYTES # BLD AUTO: 0.46 THOUSAND/UL (ref 0.6–4.47)
LYMPHOCYTES # BLD AUTO: 8 % (ref 14–44)
MAGNESIUM SERPL-MCNC: 2.3 MG/DL (ref 1.6–2.6)
MCH RBC QN AUTO: 28.6 PG (ref 26.8–34.3)
MCHC RBC AUTO-ENTMCNC: 33 G/DL (ref 31.4–37.4)
MCV RBC AUTO: 87 FL (ref 82–98)
MONOCYTES # BLD AUTO: 0.35 THOUSAND/UL (ref 0–1.22)
MONOCYTES NFR BLD: 6 % (ref 4–12)
NEUTROPHILS # BLD MANUAL: 4.99 THOUSAND/UL (ref 1.85–7.62)
NEUTS SEG NFR BLD AUTO: 86 % (ref 43–75)
P AXIS: 42 DEGREES
PLATELET # BLD AUTO: 315 THOUSANDS/UL (ref 149–390)
PLATELET BLD QL SMEAR: ADEQUATE
PMV BLD AUTO: 11.1 FL (ref 8.9–12.7)
POTASSIUM SERPL-SCNC: 4.1 MMOL/L (ref 3.5–5.3)
PR INTERVAL: 158 MS
PROT SERPL-MCNC: 5.8 G/DL (ref 6.4–8.2)
QRS AXIS: 14 DEGREES
QRSD INTERVAL: 96 MS
QT INTERVAL: 460 MS
QTC INTERVAL: 486 MS
RBC # BLD AUTO: 3.77 MILLION/UL (ref 3.88–5.62)
RBC MORPH BLD: NORMAL
SODIUM SERPL-SCNC: 140 MMOL/L (ref 136–145)
T WAVE AXIS: 20 DEGREES
VENTRICULAR RATE: 67 BPM
WBC # BLD AUTO: 5.8 THOUSAND/UL (ref 4.31–10.16)

## 2022-01-10 PROCEDURE — 99232 SBSQ HOSP IP/OBS MODERATE 35: CPT | Performed by: INTERNAL MEDICINE

## 2022-01-10 PROCEDURE — 82550 ASSAY OF CK (CPK): CPT | Performed by: INTERNAL MEDICINE

## 2022-01-10 PROCEDURE — 85730 THROMBOPLASTIN TIME PARTIAL: CPT | Performed by: INTERNAL MEDICINE

## 2022-01-10 PROCEDURE — 93306 TTE W/DOPPLER COMPLETE: CPT

## 2022-01-10 PROCEDURE — 94760 N-INVAS EAR/PLS OXIMETRY 1: CPT

## 2022-01-10 PROCEDURE — 82553 CREATINE MB FRACTION: CPT | Performed by: INTERNAL MEDICINE

## 2022-01-10 PROCEDURE — 85027 COMPLETE CBC AUTOMATED: CPT | Performed by: INTERNAL MEDICINE

## 2022-01-10 PROCEDURE — 93306 TTE W/DOPPLER COMPLETE: CPT | Performed by: INTERNAL MEDICINE

## 2022-01-10 PROCEDURE — 80053 COMPREHEN METABOLIC PANEL: CPT | Performed by: STUDENT IN AN ORGANIZED HEALTH CARE EDUCATION/TRAINING PROGRAM

## 2022-01-10 PROCEDURE — 93010 ELECTROCARDIOGRAM REPORT: CPT | Performed by: INTERNAL MEDICINE

## 2022-01-10 PROCEDURE — 85007 BL SMEAR W/DIFF WBC COUNT: CPT | Performed by: INTERNAL MEDICINE

## 2022-01-10 PROCEDURE — 83735 ASSAY OF MAGNESIUM: CPT | Performed by: INTERNAL MEDICINE

## 2022-01-10 RX ORDER — DEXAMETHASONE SODIUM PHOSPHATE 4 MG/ML
8 INJECTION, SOLUTION INTRA-ARTICULAR; INTRALESIONAL; INTRAMUSCULAR; INTRAVENOUS; SOFT TISSUE EVERY 12 HOURS SCHEDULED
Status: DISCONTINUED | OUTPATIENT
Start: 2022-01-10 | End: 2022-01-11

## 2022-01-10 RX ADMIN — METOPROLOL TARTRATE 25 MG: 25 TABLET, FILM COATED ORAL at 20:46

## 2022-01-10 RX ADMIN — DEXAMETHASONE SODIUM PHOSPHATE 8 MG: 4 INJECTION, SOLUTION INTRA-ARTICULAR; INTRALESIONAL; INTRAMUSCULAR; INTRAVENOUS; SOFT TISSUE at 20:46

## 2022-01-10 RX ADMIN — ATORVASTATIN CALCIUM 40 MG: 40 TABLET, FILM COATED ORAL at 20:45

## 2022-01-10 RX ADMIN — BARICITINIB 4 MG: 2 TABLET, FILM COATED ORAL at 12:23

## 2022-01-10 RX ADMIN — REMDESIVIR 100 MG: 100 INJECTION, POWDER, LYOPHILIZED, FOR SOLUTION INTRAVENOUS at 06:39

## 2022-01-10 RX ADMIN — ASPIRIN 81 MG: 81 TABLET, COATED ORAL at 08:39

## 2022-01-10 RX ADMIN — ENOXAPARIN SODIUM 30 MG: 30 INJECTION SUBCUTANEOUS at 20:46

## 2022-01-10 RX ADMIN — METOPROLOL TARTRATE 25 MG: 25 TABLET, FILM COATED ORAL at 08:39

## 2022-01-10 RX ADMIN — STANDARDIZED SENNA CONCENTRATE 8.6 MG: 8.6 TABLET ORAL at 21:05

## 2022-01-10 RX ADMIN — HEPARIN SODIUM 11 UNITS/KG/HR: 10000 INJECTION, SOLUTION INTRAVENOUS at 08:50

## 2022-01-10 NOTE — ASSESSMENT & PLAN NOTE
Patient presented with weakness, nausea, fatigue, body aches  Patient is unvaccinated, and acquired COVID infection from spouse who was vaccinated and doing well at home  Reportedly  COVID positive on 12/26/2021  Initially required 2 L by nasal cannula, up to 10L again this morning  Currently being maintained on the moderate COVID-19 infection treatment algorithm -    · CXR 01/05 with changes consistent with COVID-19 infection  · Check daily labs  · Trend CRP - initially at 33 3 --> 14 9  · Procalcitonin negative X 3 - hold off on antibiotics  · D-dimer 1 25 --> 1 01 - was on Heparin gtt due to elevated troponin, will change DVT prophylaxis  · Continue IV Remdesivir day #4  · Continue 6 mg IV dexamethasone daily day #4  · Continue on Baricitinib day#3  · Patient is paraplegic with peripheral edema - LE venus doppler showed no DVT

## 2022-01-10 NOTE — PLAN OF CARE
Problem: PAIN - ADULT  Goal: Verbalizes/displays adequate comfort level or baseline comfort level  Description: Interventions:  - Encourage patient to monitor pain and request assistance  - Assess pain using appropriate pain scale  - Administer analgesics based on type and severity of pain and evaluate response  - Implement non-pharmacological measures as appropriate and evaluate response  - Consider cultural and social influences on pain and pain management  - Notify physician/advanced practitioner if interventions unsuccessful or patient reports new pain  Outcome: Progressing     Problem: INFECTION - ADULT  Goal: Absence or prevention of progression during hospitalization  Description: INTERVENTIONS:  - Assess and monitor for signs and symptoms of infection  - Monitor lab/diagnostic results  - Monitor all insertion sites, i e  indwelling lines, tubes, and drains  - Monitor endotracheal if appropriate and nasal secretions for changes in amount and color  - Hunters appropriate cooling/warming therapies per order  - Administer medications as ordered  - Instruct and encourage patient and family to use good hand hygiene technique  - Identify and instruct in appropriate isolation precautions for identified infection/condition  Outcome: Progressing     Problem: SAFETY ADULT  Goal: Patient will remain free of falls  Description: INTERVENTIONS:  - Educate patient/family on patient safety including physical limitations  - Instruct patient to call for assistance with activity   - Consult OT/PT to assist with strengthening/mobility   - Keep Call bell within reach  - Keep bed low and locked with side rails adjusted as appropriate  - Keep care items and personal belongings within reach  - Initiate and maintain comfort rounds  - Make Fall Risk Sign visible to staff  - Offer Toileting every 2 Hours, in advance of need  - Initiate/Maintain fall alarm  - Obtain necessary fall risk management equipment: alarm, mobility equipment  - Apply yellow socks and bracelet for high fall risk patients  - Consider moving patient to room near nurses station  Outcome: Progressing  Goal: Maintain or return to baseline ADL function  Description: INTERVENTIONS:  -  Assess patient's ability to carry out ADLs; assess patient's baseline for ADL function and identify physical deficits which impact ability to perform ADLs (bathing, care of mouth/teeth, toileting, grooming, dressing, etc )  - Assess/evaluate cause of self-care deficits   - Assess range of motion  - Assess patient's mobility; develop plan if impaired  - Assess patient's need for assistive devices and provide as appropriate  - Encourage maximum independence but intervene and supervise when necessary  - Involve family in performance of ADLs  - Assess for home care needs following discharge   - Consider OT consult to assist with ADL evaluation and planning for discharge  - Provide patient education as appropriate  Outcome: Progressing  Goal: Maintains/Returns to pre admission functional level  Description: INTERVENTIONS:  - Perform BMAT or MOVE assessment daily    - Set and communicate daily mobility goal to care team and patient/family/caregiver  - Collaborate with rehabilitation services on mobility goals if consulted  - Perform Range of Motion 4 times a day  - Reposition patient every 2 hours    - Dangle patient 3 times a day  - Record patient progress and toleration of activity level   Outcome: Progressing     Problem: DISCHARGE PLANNING  Goal: Discharge to home or other facility with appropriate resources  Description: INTERVENTIONS:  - Identify barriers to discharge w/patient and caregiver  - Arrange for needed discharge resources and transportation as appropriate  - Identify discharge learning needs (meds, wound care, etc )  - Arrange for interpretive services to assist at discharge as needed  - Refer to Case Management Department for coordinating discharge planning if the patient needs post-hospital services based on physician/advanced practitioner order or complex needs related to functional status, cognitive ability, or social support system  Outcome: Progressing     Problem: Knowledge Deficit  Goal: Patient/family/caregiver demonstrates understanding of disease process, treatment plan, medications, and discharge instructions  Description: Complete learning assessment and assess knowledge base    Interventions:  - Provide teaching at level of understanding  - Provide teaching via preferred learning methods  Outcome: Progressing     Problem: RESPIRATORY - ADULT  Goal: Achieves optimal ventilation and oxygenation  Description: INTERVENTIONS:  - Assess for changes in respiratory status  - Assess for changes in mentation and behavior  - Position to facilitate oxygenation and minimize respiratory effort  - Oxygen administered by appropriate delivery if ordered  - Initiate smoking cessation education as indicated  - Encourage broncho-pulmonary hygiene including cough, deep breathe, Incentive Spirometry  - Assess the need for suctioning and aspirate as needed  - Assess and instruct to report SOB or any respiratory difficulty  - Respiratory Therapy support as indicated  Outcome: Progressing     Problem: GENITOURINARY - ADULT  Goal: Maintains or returns to baseline urinary function  Description: INTERVENTIONS:  - Assess urinary function  - Encourage oral fluids to ensure adequate hydration if ordered  - Administer IV fluids as ordered to ensure adequate hydration  - Administer ordered medications as needed  - Offer frequent toileting  - Follow urinary retention protocol if ordered  Outcome: Progressing  Goal: Absence of urinary retention  Description: INTERVENTIONS:  - Assess patients ability to void and empty bladder  - Monitor I/O  - Bladder scan as needed  - Discuss with physician/AP medications to alleviate retention as needed  - Discuss catheterization for long term situations as appropriate  Outcome: Progressing  Goal: Urinary catheter remains patent  Description: INTERVENTIONS:  - Assess patency of urinary catheter  - If patient has a chronic palacio, consider changing catheter if non-functioning  - Follow guidelines for intermittent irrigation of non-functioning urinary catheter  Outcome: Progressing     Problem: METABOLIC, FLUID AND ELECTROLYTES - ADULT  Goal: Electrolytes maintained within normal limits  Description: INTERVENTIONS:  - Monitor labs and assess patient for signs and symptoms of electrolyte imbalances  - Administer electrolyte replacement as ordered  - Monitor response to electrolyte replacements, including repeat lab results as appropriate  - Instruct patient on fluid and nutrition as appropriate  Outcome: Progressing  Goal: Fluid balance maintained  Description: INTERVENTIONS:  - Monitor labs   - Monitor I/O and WT  - Instruct patient on fluid and nutrition as appropriate  - Assess for signs & symptoms of volume excess or deficit  Outcome: Progressing     Problem: SKIN/TISSUE INTEGRITY - ADULT  Goal: Incision(s), wounds(s) or drain site(s) healing without S/S of infection  Description: INTERVENTIONS  - Assess and document dressing, incision, wound bed, drain sites and surrounding tissue  - Provide patient and family education  - Perform skin care/dressing changes every day or as ordered  Outcome: Progressing  Goal: Pressure injury heals and does not worsen  Description: Interventions:  - Implement low air loss mattress or specialty surface (Criteria met)  - Apply silicone foam dressing  - Instruct/assist with weight shifting every 120 minutes when in chair   - Limit chair time to 6 hour intervals  - Use special pressure reducing interventions such as weight shifting when in chair   - Apply fecal or urinary incontinence containment device   - Perform passive or active ROM every day QID  - Turn and reposition patient & offload bony prominences every 2 hours   - Utilize friction reducing device or surface for transfers   - Consider consults to  interdisciplinary teams such as PT/OT, nutrition, wound care  - Use incontinent care products after each incontinent episode such as cleanser and barrier cream  - Consider nutrition services referral as needed  Outcome: Progressing     Problem: MUSCULOSKELETAL - ADULT  Goal: Maintain or return mobility to safest level of function  Description: INTERVENTIONS:  - Assess patient's ability to carry out ADLs; assess patient's baseline for ADL function and identify physical deficits which impact ability to perform ADLs (bathing, care of mouth/teeth, toileting, grooming, dressing, etc )  - Assess/evaluate cause of self-care deficits   - Assess range of motion  - Assess patient's mobility  - Assess patient's need for assistive devices and provide as appropriate  - Encourage maximum independence but intervene and supervise when necessary  - Involve family in performance of ADLs  - Assess for home care needs following discharge   - Consider OT consult to assist with ADL evaluation and planning for discharge  - Provide patient education as appropriate  Outcome: Progressing     Problem: Nutrition/Hydration-ADULT  Goal: Nutrient/Hydration intake appropriate for improving, restoring or maintaining nutritional needs  Description: Monitor and assess patient's nutrition/hydration status for malnutrition  Collaborate with interdisciplinary team and initiate plan and interventions as ordered  Monitor patient's weight and dietary intake as ordered or per policy  Utilize nutrition screening tool and intervene as necessary  Determine patient's food preferences and provide high-protein, high-caloric foods as appropriate       INTERVENTIONS:  - Monitor oral intake, urinary output, labs, and treatment plans  - Assess nutrition and hydration status and recommend course of action  - Evaluate amount of meals eaten  - Assist patient with eating if necessary   - Allow adequate time for meals  - Recommend/ encourage appropriate diets, oral nutritional supplements, and vitamin/mineral supplements  - Order, calculate, and assess calorie counts as needed  - Recommend, monitor, and adjust tube feedings and TPN/PPN based on assessed needs  - Assess need for intravenous fluids  - Provide specific nutrition/hydration education as appropriate  - Include patient/family/caregiver in decisions related to nutrition  Outcome: Progressing     Problem: MOBILITY - ADULT  Goal: Maintain or return to baseline ADL function  Description: INTERVENTIONS:  -  Assess patient's ability to carry out ADLs; assess patient's baseline for ADL function and identify physical deficits which impact ability to perform ADLs (bathing, care of mouth/teeth, toileting, grooming, dressing, etc )  - Assess/evaluate cause of self-care deficits   - Assess range of motion  - Assess patient's mobility; develop plan if impaired  - Assess patient's need for assistive devices and provide as appropriate  - Encourage maximum independence but intervene and supervise when necessary  - Involve family in performance of ADLs  - Assess for home care needs following discharge   - Consider OT consult to assist with ADL evaluation and planning for discharge  - Provide patient education as appropriate  Outcome: Progressing  Goal: Maintains/Returns to pre admission functional level  Description: INTERVENTIONS:  - Perform BMAT or MOVE assessment daily    - Set and communicate daily mobility goal to care team and patient/family/caregiver  - Collaborate with rehabilitation services on mobility goals if consulted  - Perform Range of Motion 4 times a day  - Reposition patient every 2 hours    - Dangle patient 3 times a day  - Record patient progress and toleration of activity level   Outcome: Progressing     Problem: Potential for Falls  Goal: Patient will remain free of falls  Description: INTERVENTIONS:  - Educate patient/family on patient safety including physical limitations  - Instruct patient to call for assistance with activity   - Consult OT/PT to assist with strengthening/mobility   - Keep Call bell within reach  - Keep bed low and locked with side rails adjusted as appropriate  - Keep care items and personal belongings within reach  - Initiate and maintain comfort rounds  - Make Fall Risk Sign visible to staff  - Offer Toileting every 2 Hours, in advance of need  - Initiate/Maintain fall alarm  - Obtain necessary fall risk management equipment: alarm, mobility equipment  - Apply yellow socks and bracelet for high fall risk patients  - Consider moving patient to room near nurses station  Outcome: Progressing     Problem: Prexisting or High Potential for Compromised Skin Integrity  Goal: Skin integrity is maintained or improved  Description: INTERVENTIONS:  - Identify patients at risk for skin breakdown  - Assess and monitor skin integrity  - Assess and monitor nutrition and hydration status  - Monitor labs   - Assess for incontinence   - Turn and reposition patient  - Assist with mobility/ambulation  - Relieve pressure over bony prominences  - Avoid friction and shearing  - Provide appropriate hygiene as needed including keeping skin clean and dry  - Evaluate need for skin moisturizer/barrier cream  - Collaborate with interdisciplinary team   - Patient/family teaching  - Consider wound care consult   Outcome: Progressing

## 2022-01-10 NOTE — ASSESSMENT & PLAN NOTE
No O2 requirement at baseline - current hypoxia is in the setting of COVID-19 infection  With increasing oxygen requirement previously  See above

## 2022-01-10 NOTE — PLAN OF CARE
Problem: PAIN - ADULT  Goal: Verbalizes/displays adequate comfort level or baseline comfort level  Description: Interventions:  - Encourage patient to monitor pain and request assistance  - Assess pain using appropriate pain scale  - Administer analgesics based on type and severity of pain and evaluate response  - Implement non-pharmacological measures as appropriate and evaluate response  - Consider cultural and social influences on pain and pain management  - Notify physician/advanced practitioner if interventions unsuccessful or patient reports new pain  Outcome: Progressing     Problem: INFECTION - ADULT  Goal: Absence or prevention of progression during hospitalization  Description: INTERVENTIONS:  - Assess and monitor for signs and symptoms of infection  - Monitor lab/diagnostic results  - Monitor all insertion sites, i e  indwelling lines, tubes, and drains  - Monitor endotracheal if appropriate and nasal secretions for changes in amount and color  - Pavilion appropriate cooling/warming therapies per order  - Administer medications as ordered  - Instruct and encourage patient and family to use good hand hygiene technique  - Identify and instruct in appropriate isolation precautions for identified infection/condition  Outcome: Progressing     Problem: SAFETY ADULT  Goal: Patient will remain free of falls  Description: INTERVENTIONS:  - Educate patient/family on patient safety including physical limitations  - Instruct patient to call for assistance with activity   - Consult OT/PT to assist with strengthening/mobility   - Keep Call bell within reach  - Keep bed low and locked with side rails adjusted as appropriate  - Keep care items and personal belongings within reach  - Initiate and maintain comfort rounds  - Make Fall Risk Sign visible to staff  - Offer Toileting every 2 Hours, in advance of need  - Initiate/Maintain fall alarm  - Obtain necessary fall risk management equipment: alarm, mobility equipment  - Apply yellow socks and bracelet for high fall risk patients  - Consider moving patient to room near nurses station  Outcome: Progressing  Goal: Maintain or return to baseline ADL function  Description: INTERVENTIONS:  -  Assess patient's ability to carry out ADLs; assess patient's baseline for ADL function and identify physical deficits which impact ability to perform ADLs (bathing, care of mouth/teeth, toileting, grooming, dressing, etc )  - Assess/evaluate cause of self-care deficits   - Assess range of motion  - Assess patient's mobility; develop plan if impaired  - Assess patient's need for assistive devices and provide as appropriate  - Encourage maximum independence but intervene and supervise when necessary  - Involve family in performance of ADLs  - Assess for home care needs following discharge   - Consider OT consult to assist with ADL evaluation and planning for discharge  - Provide patient education as appropriate  Outcome: Progressing  Goal: Maintains/Returns to pre admission functional level  Description: INTERVENTIONS:  - Perform BMAT or MOVE assessment daily    - Set and communicate daily mobility goal to care team and patient/family/caregiver  - Collaborate with rehabilitation services on mobility goals if consulted  - Perform Range of Motion 4 times a day  - Reposition patient every 2 hours    - Dangle patient 3 times a day  - Record patient progress and toleration of activity level   Outcome: Progressing     Problem: DISCHARGE PLANNING  Goal: Discharge to home or other facility with appropriate resources  Description: INTERVENTIONS:  - Identify barriers to discharge w/patient and caregiver  - Arrange for needed discharge resources and transportation as appropriate  - Identify discharge learning needs (meds, wound care, etc )  - Arrange for interpretive services to assist at discharge as needed  - Refer to Case Management Department for coordinating discharge planning if the patient needs post-hospital services based on physician/advanced practitioner order or complex needs related to functional status, cognitive ability, or social support system  Outcome: Progressing     Problem: Knowledge Deficit  Goal: Patient/family/caregiver demonstrates understanding of disease process, treatment plan, medications, and discharge instructions  Description: Complete learning assessment and assess knowledge base    Interventions:  - Provide teaching at level of understanding  - Provide teaching via preferred learning methods  Outcome: Progressing     Problem: RESPIRATORY - ADULT  Goal: Achieves optimal ventilation and oxygenation  Description: INTERVENTIONS:  - Assess for changes in respiratory status  - Assess for changes in mentation and behavior  - Position to facilitate oxygenation and minimize respiratory effort  - Oxygen administered by appropriate delivery if ordered  - Initiate smoking cessation education as indicated  - Encourage broncho-pulmonary hygiene including cough, deep breathe, Incentive Spirometry  - Assess the need for suctioning and aspirate as needed  - Assess and instruct to report SOB or any respiratory difficulty  - Respiratory Therapy support as indicated  Outcome: Progressing     Problem: GENITOURINARY - ADULT  Goal: Maintains or returns to baseline urinary function  Description: INTERVENTIONS:  - Assess urinary function  - Encourage oral fluids to ensure adequate hydration if ordered  - Administer IV fluids as ordered to ensure adequate hydration  - Administer ordered medications as needed  - Offer frequent toileting  - Follow urinary retention protocol if ordered  Outcome: Progressing  Goal: Absence of urinary retention  Description: INTERVENTIONS:  - Assess patients ability to void and empty bladder  - Monitor I/O  - Bladder scan as needed  - Discuss with physician/AP medications to alleviate retention as needed  - Discuss catheterization for long term situations as appropriate  Outcome: Progressing  Goal: Urinary catheter remains patent  Description: INTERVENTIONS:  - Assess patency of urinary catheter  - If patient has a chronic palacio, consider changing catheter if non-functioning  - Follow guidelines for intermittent irrigation of non-functioning urinary catheter  Outcome: Progressing     Problem: METABOLIC, FLUID AND ELECTROLYTES - ADULT  Goal: Electrolytes maintained within normal limits  Description: INTERVENTIONS:  - Monitor labs and assess patient for signs and symptoms of electrolyte imbalances  - Administer electrolyte replacement as ordered  - Monitor response to electrolyte replacements, including repeat lab results as appropriate  - Instruct patient on fluid and nutrition as appropriate  Outcome: Progressing  Goal: Fluid balance maintained  Description: INTERVENTIONS:  - Monitor labs   - Monitor I/O and WT  - Instruct patient on fluid and nutrition as appropriate  - Assess for signs & symptoms of volume excess or deficit  Outcome: Progressing     Problem: SKIN/TISSUE INTEGRITY - ADULT  Goal: Incision(s), wounds(s) or drain site(s) healing without S/S of infection  Description: INTERVENTIONS  - Assess and document dressing, incision, wound bed, drain sites and surrounding tissue  - Provide patient and family education  - Perform skin care/dressing changes every day or as ordered  Outcome: Progressing  Goal: Pressure injury heals and does not worsen  Description: Interventions:  - Implement low air loss mattress or specialty surface (Criteria met)  - Apply silicone foam dressing  - Instruct/assist with weight shifting every 120 minutes when in chair   - Limit chair time to 6 hour intervals  - Use special pressure reducing interventions such as weight shifting when in chair   - Apply fecal or urinary incontinence containment device   - Perform passive or active ROM every day QID  - Turn and reposition patient & offload bony prominences every 2 hours   - Utilize friction reducing device or surface for transfers   - Consider consults to  interdisciplinary teams such as PT/OT, nutrition, wound care  - Use incontinent care products after each incontinent episode such as cleanser and barrier cream  - Consider nutrition services referral as needed  Outcome: Progressing     Problem: MUSCULOSKELETAL - ADULT  Goal: Maintain or return mobility to safest level of function  Description: INTERVENTIONS:  - Assess patient's ability to carry out ADLs; assess patient's baseline for ADL function and identify physical deficits which impact ability to perform ADLs (bathing, care of mouth/teeth, toileting, grooming, dressing, etc )  - Assess/evaluate cause of self-care deficits   - Assess range of motion  - Assess patient's mobility  - Assess patient's need for assistive devices and provide as appropriate  - Encourage maximum independence but intervene and supervise when necessary  - Involve family in performance of ADLs  - Assess for home care needs following discharge   - Consider OT consult to assist with ADL evaluation and planning for discharge  - Provide patient education as appropriate  Outcome: Progressing     Problem: Nutrition/Hydration-ADULT  Goal: Nutrient/Hydration intake appropriate for improving, restoring or maintaining nutritional needs  Description: Monitor and assess patient's nutrition/hydration status for malnutrition  Collaborate with interdisciplinary team and initiate plan and interventions as ordered  Monitor patient's weight and dietary intake as ordered or per policy  Utilize nutrition screening tool and intervene as necessary  Determine patient's food preferences and provide high-protein, high-caloric foods as appropriate       INTERVENTIONS:  - Monitor oral intake, urinary output, labs, and treatment plans  - Assess nutrition and hydration status and recommend course of action  - Evaluate amount of meals eaten  - Assist patient with eating if necessary   - Allow adequate time for meals  - Recommend/ encourage appropriate diets, oral nutritional supplements, and vitamin/mineral supplements  - Order, calculate, and assess calorie counts as needed  - Recommend, monitor, and adjust tube feedings and TPN/PPN based on assessed needs  - Assess need for intravenous fluids  - Provide specific nutrition/hydration education as appropriate  - Include patient/family/caregiver in decisions related to nutrition  Outcome: Progressing     Problem: MOBILITY - ADULT  Goal: Maintain or return to baseline ADL function  Description: INTERVENTIONS:  -  Assess patient's ability to carry out ADLs; assess patient's baseline for ADL function and identify physical deficits which impact ability to perform ADLs (bathing, care of mouth/teeth, toileting, grooming, dressing, etc )  - Assess/evaluate cause of self-care deficits   - Assess range of motion  - Assess patient's mobility; develop plan if impaired  - Assess patient's need for assistive devices and provide as appropriate  - Encourage maximum independence but intervene and supervise when necessary  - Involve family in performance of ADLs  - Assess for home care needs following discharge   - Consider OT consult to assist with ADL evaluation and planning for discharge  - Provide patient education as appropriate  Outcome: Progressing  Goal: Maintains/Returns to pre admission functional level  Description: INTERVENTIONS:  - Perform BMAT or MOVE assessment daily    - Set and communicate daily mobility goal to care team and patient/family/caregiver  - Collaborate with rehabilitation services on mobility goals if consulted  - Perform Range of Motion 4 times a day  - Reposition patient every 2 hours    - Dangle patient 3 times a day  - Record patient progress and toleration of activity level   Outcome: Progressing     Problem: Potential for Falls  Goal: Patient will remain free of falls  Description: INTERVENTIONS:  - Educate patient/family on patient safety including physical limitations  - Instruct patient to call for assistance with activity   - Consult OT/PT to assist with strengthening/mobility   - Keep Call bell within reach  - Keep bed low and locked with side rails adjusted as appropriate  - Keep care items and personal belongings within reach  - Initiate and maintain comfort rounds  - Make Fall Risk Sign visible to staff  - Offer Toileting every 2 Hours, in advance of need  - Initiate/Maintain fall alarm  - Obtain necessary fall risk management equipment: alarm, mobility equipment  - Apply yellow socks and bracelet for high fall risk patients  - Consider moving patient to room near nurses station  Outcome: Progressing     Problem: Prexisting or High Potential for Compromised Skin Integrity  Goal: Skin integrity is maintained or improved  Description: INTERVENTIONS:  - Identify patients at risk for skin breakdown  - Assess and monitor skin integrity  - Assess and monitor nutrition and hydration status  - Monitor labs   - Assess for incontinence   - Turn and reposition patient  - Assist with mobility/ambulation  - Relieve pressure over bony prominences  - Avoid friction and shearing  - Provide appropriate hygiene as needed including keeping skin clean and dry  - Evaluate need for skin moisturizer/barrier cream  - Collaborate with interdisciplinary team   - Patient/family teaching  - Consider wound care consult   Outcome: Progressing

## 2022-01-10 NOTE — ASSESSMENT & PLAN NOTE
Mildly elevated CPK in setting of acute COVID-19 infection  Normalized with IV fluids  Discontinued IV fluids

## 2022-01-10 NOTE — RESPIRATORY THERAPY NOTE
01/09/22 2049   Oxygen Therapy/Pulse Ox   O2 Device Mid flow nasal cannula   O2 Therapy Oxygen humidified   Nasal Cannula O2 Flow Rate (L/min) 10 L/min   Calculated FIO2 (%) - Nasal Cannula 60   SpO2 Activity At Rest

## 2022-01-10 NOTE — RESPIRATORY THERAPY NOTE
01/09/22 4547   Respiratory Assessment   General Appearance Alert; Awake   Respiratory Pattern Tachypneic;Symmetrical;Spontaneous   Chest Assessment Chest expansion symmetrical;Trachea midline   Cough Strong;Moist;Non-productive   Resp Comments filled wqater   Oxygen Therapy/Pulse Ox   O2 Device Mid flow nasal cannula   O2 Therapy Oxygen humidified   O2 Flow Rate (L/min) 10 L/min

## 2022-01-10 NOTE — PROGRESS NOTES
Progress Note - Pulmonary   Bryson Munson 73 y o  male MRN: [id number]  Unit/Bed#: 420-01 [facility]    Assessment/Plan:    1  Acute hypoxic respiratory failure   Currently requiring 9 L mid flow  No oxygen requirement at baseline   Titrate oxygen to maintain SpO2 greater than or equal to 88%   Pulmonary toilet:  Increase activity as tolerated, out of bed as tolerated, cough and deep breathing as encouraged, incentive spirometry q 1 hour lateral decubitus position encouraged   Patient is unable to prone   Will eventuall need Home O2 eval  2  COVID-19 pneumonia   Tested positive 2022, symptom onset 2021  Unvaccinated   Escalate to moderate protocol   Continue baricitinib day#3/14   Continue remdesivir through today to complete 5 day course   Continue Decadron but increase to b i d  Dosing-increased to Decadron 8 mg IV b i d  Today   Continue heparin gtt per primary team given elevated troponin    Completed course of antibiotics per primary team  3  Hemoptysis   · Chest x-ray yesterday shows increasing bilateral ground-glass opacities  · Continue to monitor  · Likely in the setting of COVID and anticoagulation   · Would not recommend bronchoscopy at this time unless it worsens   4  Elevated troponin   Likely non MI troponin elevation in the setting of COVID/hypoxia   EKG without ischemic changes   Troponins trending down   Heparin gtt initiated- defer management per primary team   5  Paraplegia   Noted, increased risk of VTE given decreased mobility however venous duplex this admission without acute or chronic DVT of bilateral lower extremities    Chief Complaint:    "I feel okay I guess "    Subjective:    Patient was seen and examined today  No overnight events reported  Patient states that he feels better than yesterday but not back to his baseline  Reports dyspnea with minimal activities  He feels that after he talks or long periods of time he develops chest tightness  No fevers or chills  Coughing up dark red blood mixed with for an today and yesterday  Objective:    Vitals: Blood pressure 122/67, pulse 72, temperature 98 °F (36 7 °C), temperature source Temporal, resp  rate 20, height 5' 4" (1 626 m), weight 82 1 kg (181 lb), SpO2 93 %  9L midflow,Body mass index is 31 07 kg/m²  Intake/Output Summary (Last 24 hours) at 1/10/2022 1529  Last data filed at 1/10/2022 1521  Gross per 24 hour   Intake 360 ml   Output 2050 ml   Net -1690 ml       Invasive Devices  Report    Peripheral Intravenous Line            Peripheral IV 01/08/22 Dorsal (posterior); Left Hand 2 days    Peripheral IV 01/10/22 Dorsal (posterior); Right Forearm <1 day          Drain            Urethral Catheter Straight-tip 14 Fr  4 days                Physical Exam:     Physical Exam  Vitals and nursing note reviewed  Constitutional:       General: He is not in acute distress  Appearance: Normal appearance  HENT:      Head: Normocephalic and atraumatic  Right Ear: External ear normal       Left Ear: External ear normal       Nose: Nose normal       Mouth/Throat:      Mouth: Mucous membranes are moist       Pharynx: Oropharynx is clear  Eyes:      Extraocular Movements: Extraocular movements intact  Conjunctiva/sclera: Conjunctivae normal       Pupils: Pupils are equal, round, and reactive to light  Cardiovascular:      Rate and Rhythm: Normal rate and regular rhythm  Pulses: Normal pulses  Heart sounds: No murmur heard  Pulmonary:      Effort: Pulmonary effort is normal       Breath sounds: No wheezing, rhonchi or rales  Abdominal:      General: Bowel sounds are normal       Palpations: Abdomen is soft  There is no mass  Tenderness: There is no abdominal tenderness  Hernia: No hernia is present  Musculoskeletal:      Cervical back: Normal range of motion and neck supple  No muscular tenderness  Right lower leg: No edema  Left lower leg: No edema  Skin:     General: Skin is warm and dry  Neurological:      General: No focal deficit present  Mental Status: He is alert and oriented to person, place, and time  Mental status is at baseline  Psychiatric:         Mood and Affect: Mood normal          Behavior: Behavior normal          Thought Content: Thought content normal          Judgment: Judgment normal          Labs: I have personally reviewed pertinent lab results  , ABG: No results found for: PHART, DXP7EKY, PO2ART, [id number], [id number], BEART, SOURCE, BNP: No results found for: BNP, CBC:   Lab Results   Component Value Date    WBC 5 80 01/10/2022    HGB 10 8 (L) 01/10/2022    HCT 32 7 (L) 01/10/2022    MCV 87 01/10/2022     01/10/2022    MCH 28 6 01/10/2022    MCHC 33 0 01/10/2022    RDW 13 2 01/10/2022    MPV 11 1 01/10/2022   , CMP:   Lab Results   Component Value Date    SODIUM 140 01/10/2022    K 4 1 01/10/2022     01/10/2022    CO2 25 01/10/2022    BUN 15 01/10/2022    CREATININE 0 53 (L) 01/10/2022    CALCIUM 7 9 (L) 01/10/2022    AST 61 (H) 01/10/2022    ALT 88 (H) 01/10/2022    ALKPHOS 51 01/10/2022    EGFR 107 01/10/2022   , PT/INR: No results found for: PT, INR, Troponin: No results found for: TROPONINI    Imaging and other studies: I have personally reviewed pertinent reports     and I have personally reviewed pertinent films in PACS     Chest x-ray yesterday shows worsening ground-glass opacities bilaterally

## 2022-01-10 NOTE — PROGRESS NOTES
[address] [facility] [address] [place]  Progress Note Lexi Harrison 1950, 73 y o  male MRN: [id number]  Unit/Bed#: 420-01 Encounter: [id number]  Primary Care Provider: No primary care provider on file  Date and time admitted to hospital: 1/5/2022  4:52 PM    * Pneumonia due to COVID-19 virus  Assessment & Plan  Patient presented with weakness, nausea, fatigue, body aches  Patient is unvaccinated, and acquired COVID infection from spouse who was vaccinated and doing well at home  Reportedly  COVID positive on 12/26/2021  Initially required 2 L by nasal cannula, up to 10L again this morning  Currently being maintained on the moderate COVID-19 infection treatment algorithm -    · CXR 01/05 with changes consistent with COVID-19 infection  · Check daily labs  · Trend CRP - initially at 33 3 --> 14 9  · Procalcitonin negative X 3 - hold off on antibiotics  · D-dimer 1 25 --> 1 01 - was on Heparin gtt due to elevated troponin, will change DVT prophylaxis  · Continue IV Remdesivir day #4  · Continue 6 mg IV dexamethasone daily day #4  · Continue on Baricitinib day#3  · Patient is paraplegic with peripheral edema - LE venus doppler showed no DVT  Acute respiratory failure with hypoxia (HCC)  Assessment & Plan  No O2 requirement at baseline - current hypoxia is in the setting of COVID-19 infection  With increasing oxygen requirement previously  See above  Elevated troponin  Assessment & Plan  Initial minimal elevation of high sensitivity troponin to a value of 85 likely represented non MI elevation in the setting of infection  Patient had been asymptomatic  Developed atypical chest pain on 01/08 - repeat troponin elevated at 1,618  ECG with NSR, downward deflecting Q-waves in 1/aVL, 2/3/AVF, and V5/V6 - most of which look essentially unchanged from time of admission, and without any ST segment abnormalities  · Heparin gtt X 48 hours - will discontinue now    · Initiated on daily ASA and high potency statin  · Was also started on low-dose BB therapy  · Trended troponin 85 --> 1,618, 2,182  Now 1,249 with patient chest pain-free  · Check ECHO - ordered and pending  · Discontinue telemetry  · Discussed case with Cardiology by tiger text previously - believes this to be non MI elevation in troponin in the setting of worsening hypoxia and COVID-19 infection  Agree with plans as above, with no need to transfer for further evaluation, treatment  Will need outpatient cardiology follow-up for further ischemic evaluation  Paraplegia [facility])  Assessment & Plan  Chronic condition secondary to previous trauma - lives at home and self caths  Neurogenic bladder  Assessment & Plan  Related to paraplegia - patient chronically self caths, currently with Silver catheter in place in the setting of acute illness  Monitor symptoms, discontinue Silver catheter soon  Skin ulcer of toe of left foot with fat layer exposed ([address] )  Assessment & Plan  Followed by wound care regularly  Vascular surgery referral was placed recently  Consult wound care while hospitalized  Skin ulcer of toe of right foot with fat layer exposed [facility])  Assessment & Plan  See management above  Other constipation  Assessment & Plan  Reports a chronic history of constipation, but refusing bowel regimen  PAD (peripheral artery disease) ([facility])  Assessment & Plan  Continue ASA 81 - does not appear to be on statin therapy chronically  Started on atorvastatin given rise in troponin levels  Transaminitis  Assessment & Plan  Mildly elevated LFTs likely in the setting of COVID-19 infection  Continue to monitor  Elevated CPK  Assessment & Plan  Mildly elevated CPK in setting of acute COVID-19 infection  Normalized with IV fluids  Discontinued IV fluids        VTE Pharmacologic Prophylaxis: VTE Score: 7 Moderate Risk (Score 3-4) - Pharmacological DVT Prophylaxis Ordered: enoxaparin (Lovenox)      Patient Centered Rounds: I performed bedside rounds with nursing staff today  Discussions with Specialists or Other Care Team Provider: None     Education and Discussions with Family / Patient: Updated  (wife) via phone      Time Spent for Care: 20 minutes  More than 50% of total time spent on counseling and coordination of care as described above      Current Length of Stay: 3 day(s)  Current Patient Status: Inpatient   Certification Statement: The patient will continue to require additional inpatient hospital stay due to Continue treatment of COVID-19 infection, currently with worsening hypoxia  Discharge Plan: Anticipate discharge in >72 hrs to home      Code Status: Level 1 - Full Code    Subjective:   Patient seen and examined - reports mild improvement in symptoms today  No further chest pains reported  Continued hypoxia, currently down to 9 L by mid flow  No overnight events reported  Remains afebrile  Objective:     Vitals:   Temp (24hrs), Av 1 °F (36 7 °C), Min:98 °F (36 7 °C), Max:98 2 °F (36 8 °C)    Temp:  [98 °F (36 7 °C)-98 2 °F (36 8 °C)] 98 °F (36 7 °C)  HR:  [72-84] 72  Resp:  [18-20] 20  BP: (106-140)/(51-76) 122/67  SpO2:  [90 %-93 %] 93 %  Body mass index is 31 07 kg/m²  Input and Output Summary (last 24 hours): Intake/Output Summary (Last 24 hours) at 1/10/2022 1717  Last data filed at 1/10/2022 1713  Gross per 24 hour   Intake 540 ml   Output 2050 ml   Net -1510 ml       Physical Exam:   Vitals and nursing note reviewed  Constitutional:       General: He is not in acute distress      Appearance: He is well-developed  He is ill-appearing  He is not toxic-appearing  Cardiovascular:      Rate and Rhythm: Normal rate and regular rhythm       Heart sounds: No murmur heard  Pulmonary:      Effort: Pulmonary effort is normal  No respiratory distress       Breath sounds: Normal breath sounds  No wheezing, rhonchi or rales  Abdominal:      General: Abdomen is flat   Bowel sounds are normal       Palpations: Abdomen is soft       Tenderness: There is no abdominal tenderness  There is no guarding or rebound  Musculoskeletal:         General: Swelling present and reportedly chronic  No tenderness  Skin:     General: Skin is warm and dry  Neurological:      General: No focal deficit present       Mental Status: He is alert and oriented to person, place, and time  Psychiatric:         Mood and Affect: Mood normal          Behavior: Behavior normal      Additional Data:     Labs:  Results from last 7 days   Lab Units 01/10/22  0455 01/09/22  0120 01/08/22  0612   WBC Thousand/uL 5 80   < > 4 40   HEMOGLOBIN g/dL 10 8*   < > 10 8*   HEMATOCRIT % 32 7*   < > 33 4*   PLATELETS Thousands/uL 315   < > 239   NEUTROS PCT %  --   --  86*   LYMPHS PCT %  --   --  6*   LYMPHO PCT % 8*   < >  --    MONOS PCT %  --   --  7   MONO PCT % 6   < >  --    EOS PCT % 0   < > 0    < > = values in this interval not displayed  Results from last 7 days   Lab Units 01/10/22  0455   SODIUM mmol/L 140   POTASSIUM mmol/L 4 1   CHLORIDE mmol/L 108   CO2 mmol/L 25   BUN mg/dL 15   CREATININE mg/dL 0 53*   ANION GAP mmol/L 7   CALCIUM mg/dL 7 9*   ALBUMIN g/dL 2 6*   TOTAL BILIRUBIN mg/dL 0 52   ALK PHOS U/L 51   ALT U/L 88*   AST U/L 61*   GLUCOSE RANDOM mg/dL 153*     Results from last 7 days   Lab Units 01/09/22  0120   INR  1 28*         Results from last 7 days   Lab Units 01/06/22  0432   HEMOGLOBIN A1C % 6 5*     Results from last 7 days   Lab Units 01/07/22  0546 01/06/22  0445 01/06/22  0432 01/05/22  1747   LACTIC ACID mmol/L  --   --   --  1 2   PROCALCITONIN ng/ml <0 05 <0 05 <0 05  --        Lines/Drains:  Invasive Devices  Report    Peripheral Intravenous Line            Peripheral IV 01/08/22 Dorsal (posterior); Left Hand 2 days    Peripheral IV 01/10/22 Dorsal (posterior); Right Forearm <1 day          Drain            Urethral Catheter Straight-tip 14 Fr  4 days                Imaging: No pertinent imaging reviewed  Recent Cultures (last 7 days):   Results from last 7 days   Lab Units 01/06/22  1432   URINE CULTURE  No Growth <1000 cfu/mL       Last 24 Hours Medication List:   Current Facility-Administered Medications   Medication Dose Route Frequency Provider Last Rate    acetaminophen  650 mg Oral Q6H PRN Maggie Vega MD      aluminum-magnesium hydroxide-simethicone  30 mL Oral Q6H PRN Maggie Vega MD      aspirin  81 mg Oral Daily Danya Ferrell MD      atorvastatin  40 mg Oral QPM Katya Medina MD      Baricitinib  4 mg Oral Q24H Katya Medina MD      dexamethasone  8 mg Intravenous Q12H [facility] Jaison Sanford PA-C      docusate sodium  100 mg Oral BID Maggie Vega MD      enoxaparin  30 mg Subcutaneous Q12H Kristian Ulrich MD      metoprolol tartrate  25 mg Oral Q12H [facility] Katya Medina MD      nitroglycerin  0 4 mg Sublingual Q5 Min PRN Marcial Fallon MD      ondansetron  4 mg Intravenous Q6H PRN Maggie Vega MD      polyethylene glycol  17 g Oral Daily PRN Danya Ferrell MD      senna  1 tablet Oral HS Katya Medina MD          Today, Patient Was Seen By: Katya Medina MD    **Please Note: This note may have been constructed using a voice recognition system  **

## 2022-01-11 PROBLEM — I50.20 SYSTOLIC CONGESTIVE HEART FAILURE (HCC): Status: ACTIVE | Noted: 2022-01-11

## 2022-01-11 LAB
ALBUMIN SERPL BCP-MCNC: 2.7 G/DL (ref 3.5–5)
ALP SERPL-CCNC: 56 U/L (ref 46–116)
ALT SERPL W P-5'-P-CCNC: 88 U/L (ref 12–78)
ANION GAP SERPL CALCULATED.3IONS-SCNC: 11 MMOL/L (ref 4–13)
AORTIC ROOT: 2.9 CM
APICAL FOUR CHAMBER EJECTION FRACTION: 33 %
AST SERPL W P-5'-P-CCNC: 63 U/L (ref 5–45)
BASOPHILS # BLD AUTO: 0.01 THOUSANDS/ΜL (ref 0–0.1)
BASOPHILS NFR BLD AUTO: 0 % (ref 0–1)
BILIRUB SERPL-MCNC: 0.68 MG/DL (ref 0.2–1)
BUN SERPL-MCNC: 18 MG/DL (ref 5–25)
CALCIUM ALBUM COR SERPL-MCNC: 9.1 MG/DL (ref 8.3–10.1)
CALCIUM SERPL-MCNC: 8.1 MG/DL (ref 8.3–10.1)
CHLORIDE SERPL-SCNC: 106 MMOL/L (ref 100–108)
CO2 SERPL-SCNC: 21 MMOL/L (ref 21–32)
CREAT SERPL-MCNC: 0.56 MG/DL (ref 0.6–1.3)
E WAVE DECELERATION TIME: 124 MS
EOSINOPHIL # BLD AUTO: 0 THOUSAND/ΜL (ref 0–0.61)
EOSINOPHIL NFR BLD AUTO: 0 % (ref 0–6)
ERYTHROCYTE [DISTWIDTH] IN BLOOD BY AUTOMATED COUNT: 13.3 % (ref 11.6–15.1)
FRACTIONAL SHORTENING: 35 % (ref 28–44)
GFR SERPL CREATININE-BSD FRML MDRD: 105 ML/MIN/1.73SQ M
GLUCOSE SERPL-MCNC: 136 MG/DL (ref 65–140)
HCT VFR BLD AUTO: 35.6 % (ref 36.5–49.3)
HGB BLD-MCNC: 11.3 G/DL (ref 12–17)
IMM GRANULOCYTES # BLD AUTO: 0.08 THOUSAND/UL (ref 0–0.2)
IMM GRANULOCYTES NFR BLD AUTO: 1 % (ref 0–2)
INTERVENTRICULAR SEPTUM IN DIASTOLE (PARASTERNAL SHORT AXIS VIEW): 1.2 CM
LEFT ATRIUM AREA SYSTOLE SINGLE PLANE A4C: 20.5 CM2
LEFT ATRIUM SIZE: 3.6 CM
LEFT INTERNAL DIMENSION IN SYSTOLE: 3.3 CM (ref 2.1–4)
LEFT VENTRICLE DIASTOLIC VOLUME (MOD BIPLANE): 139 ML
LEFT VENTRICLE SYSTOLIC VOLUME (MOD BIPLANE): 96 ML
LEFT VENTRICULAR INTERNAL DIMENSION IN DIASTOLE: 5.1 CM (ref 4.56–6.79)
LEFT VENTRICULAR POSTERIOR WALL IN END DIASTOLE: 1.3 CM
LEFT VENTRICULAR STROKE VOLUME: 79 ML
LV EF: 31 %
LYMPHOCYTES # BLD AUTO: 0.41 THOUSANDS/ΜL (ref 0.6–4.47)
LYMPHOCYTES NFR BLD AUTO: 7 % (ref 14–44)
MAGNESIUM SERPL-MCNC: 2.3 MG/DL (ref 1.6–2.6)
MCH RBC QN AUTO: 28.7 PG (ref 26.8–34.3)
MCHC RBC AUTO-ENTMCNC: 31.7 G/DL (ref 31.4–37.4)
MCV RBC AUTO: 90 FL (ref 82–98)
MONOCYTES # BLD AUTO: 0.17 THOUSAND/ΜL (ref 0.17–1.22)
MONOCYTES NFR BLD AUTO: 3 % (ref 4–12)
MV E'TISSUE VEL-SEP: 9 CM/S
MV PEAK A VEL: 0.57 M/S
MV PEAK E VEL: 115 CM/S
NEUTROPHILS # BLD AUTO: 5.22 THOUSANDS/ΜL (ref 1.85–7.62)
NEUTS SEG NFR BLD AUTO: 89 % (ref 43–75)
NRBC BLD AUTO-RTO: 0 /100 WBCS
PA SYSTOLIC PRESSURE: 50 MMHG
PLATELET # BLD AUTO: 225 THOUSANDS/UL (ref 149–390)
PMV BLD AUTO: 11 FL (ref 8.9–12.7)
POTASSIUM SERPL-SCNC: 4.2 MMOL/L (ref 3.5–5.3)
PROT SERPL-MCNC: 6.3 G/DL (ref 6.4–8.2)
RA PRESSURE ESTIMATED: 10 MMHG
RBC # BLD AUTO: 3.94 MILLION/UL (ref 3.88–5.62)
RIGHT ATRIUM AREA SYSTOLE A4C: 17.5 CM2
RIGHT VENTRICLE ID DIMENSION: 2.7 CM
SL CV LV EF: 40
SL CV PED ECHO LEFT VENTRICLE DIASTOLIC VOLUME (MOD BIPLANE) 2D: 125 ML
SL CV PED ECHO LEFT VENTRICLE SYSTOLIC VOLUME (MOD BIPLANE) 2D: 45 ML
SODIUM SERPL-SCNC: 138 MMOL/L (ref 136–145)
TRICUSPID VALVE PEAK REGURGITATION VELOCITY: 3.13 M/S
TV PEAK GRADIENT: 39 MMHG
WBC # BLD AUTO: 5.89 THOUSAND/UL (ref 4.31–10.16)
Z-SCORE OF LEFT VENTRICULAR DIMENSION IN END SYSTOLE: -0.86

## 2022-01-11 PROCEDURE — 94760 N-INVAS EAR/PLS OXIMETRY 1: CPT

## 2022-01-11 PROCEDURE — 99233 SBSQ HOSP IP/OBS HIGH 50: CPT | Performed by: INTERNAL MEDICINE

## 2022-01-11 PROCEDURE — 80053 COMPREHEN METABOLIC PANEL: CPT | Performed by: STUDENT IN AN ORGANIZED HEALTH CARE EDUCATION/TRAINING PROGRAM

## 2022-01-11 PROCEDURE — 83735 ASSAY OF MAGNESIUM: CPT | Performed by: INTERNAL MEDICINE

## 2022-01-11 PROCEDURE — 97530 THERAPEUTIC ACTIVITIES: CPT

## 2022-01-11 PROCEDURE — 85025 COMPLETE CBC W/AUTO DIFF WBC: CPT | Performed by: INTERNAL MEDICINE

## 2022-01-11 PROCEDURE — 99232 SBSQ HOSP IP/OBS MODERATE 35: CPT | Performed by: PHYSICIAN ASSISTANT

## 2022-01-11 RX ORDER — PANTOPRAZOLE SODIUM 40 MG/1
40 TABLET, DELAYED RELEASE ORAL
Status: DISCONTINUED | OUTPATIENT
Start: 2022-01-11 | End: 2022-01-12 | Stop reason: HOSPADM

## 2022-01-11 RX ORDER — CLOPIDOGREL BISULFATE 75 MG/1
75 TABLET ORAL DAILY
Status: DISCONTINUED | OUTPATIENT
Start: 2022-01-11 | End: 2022-01-12 | Stop reason: HOSPADM

## 2022-01-11 RX ORDER — DEXAMETHASONE SODIUM PHOSPHATE 4 MG/ML
6 INJECTION, SOLUTION INTRA-ARTICULAR; INTRALESIONAL; INTRAMUSCULAR; INTRAVENOUS; SOFT TISSUE DAILY
Status: DISCONTINUED | OUTPATIENT
Start: 2022-01-12 | End: 2022-01-12 | Stop reason: HOSPADM

## 2022-01-11 RX ADMIN — ENOXAPARIN SODIUM 30 MG: 30 INJECTION SUBCUTANEOUS at 21:21

## 2022-01-11 RX ADMIN — METOPROLOL TARTRATE 25 MG: 25 TABLET, FILM COATED ORAL at 21:21

## 2022-01-11 RX ADMIN — DOCUSATE SODIUM 100 MG: 100 CAPSULE ORAL at 19:13

## 2022-01-11 RX ADMIN — DEXAMETHASONE SODIUM PHOSPHATE 8 MG: 4 INJECTION, SOLUTION INTRA-ARTICULAR; INTRALESIONAL; INTRAMUSCULAR; INTRAVENOUS; SOFT TISSUE at 09:19

## 2022-01-11 RX ADMIN — BARICITINIB 4 MG: 2 TABLET, FILM COATED ORAL at 14:22

## 2022-01-11 RX ADMIN — CLOPIDOGREL BISULFATE 75 MG: 75 TABLET ORAL at 14:22

## 2022-01-11 RX ADMIN — ASPIRIN 81 MG: 81 TABLET, COATED ORAL at 09:19

## 2022-01-11 RX ADMIN — METOPROLOL TARTRATE 25 MG: 25 TABLET, FILM COATED ORAL at 09:19

## 2022-01-11 RX ADMIN — PANTOPRAZOLE SODIUM 40 MG: 40 TABLET, DELAYED RELEASE ORAL at 14:22

## 2022-01-11 RX ADMIN — ATORVASTATIN CALCIUM 40 MG: 40 TABLET, FILM COATED ORAL at 19:13

## 2022-01-11 RX ADMIN — ENOXAPARIN SODIUM 30 MG: 30 INJECTION SUBCUTANEOUS at 09:19

## 2022-01-11 NOTE — CONSULTS
Consultation - Cardiology   Queenie Urrutia 73 y o  male MRN: [id number]  Unit/Bed#: 420-01 Encounter: [id number]  Physician Requesting Consult: Lezlie Baumgarten, MD  Reason for Consult / Principal Problem: Cardiomyopathy    Assessment:  Principal Problem:    Pneumonia due to COVID-19 virus  Active Problems:    Paraplegia ([facility] )    Skin ulcer of toe of left foot with fat layer exposed ([facility] )    Skin ulcer of toe of right foot with fat layer exposed ([facility] )    PAD (peripheral artery disease) ([facility] )    Acute respiratory failure with hypoxia (HCC)    Elevated troponin    Neurogenic bladder    Other constipation    Transaminitis    Elevated CPK      Plan:  1  Continue ASA 81 mg daily  2  Add Plavix 75 mg daily  3  I do not recommend ischemic testing at this time  Will eventually need Lexiscan myoview stress test     History of Present Illness     HPI: Queenie Urrutia is a 73y o  year old male   He has past medical history of paraplegia, PAD  He was admitted with weakness, SOB, nausea, fatigue, myalgias  T was 102  He did test positive for COVID on 12/26/2021  Cardiac consultation was called for because of CP on 1/9/2022       HS trop  1249, 1015, 1086    ECHO - EF 40%, PASP 50 mmHg    ECG 1/5/2022 - Sinus Tachycardia, no ST or T wave abnl  ECG 1/9/2022 -  NSR, HR 67 BPM  No ST or T wave abnl    CXR - increasing bilateral ground glass opacities    VSS                Review of Systems:    Alert awake oriented, comfortable, denies any complaints  No fevers chills nausea vomiting  No weakness, dizziness, seizures  positive for - shortness of breath  Denies any palpitations, chest pain, diaphoresis  Denies leg edema, pain or paresthesias  Denies any skin rashes  Denies abdominal pain, bloody stools, masses  Denies any depression or suicidal ideations      Historical Information   Past Medical History:   Diagnosis Date    Paraplegia ([facility] )     x30 years     Past Surgical History:   Procedure Laterality Date    BACK SURGERY Social History     Substance and Sexual Activity   Alcohol Use Never    Alcohol/week: 0 0 standard drinks     Social History     Substance and Sexual Activity   Drug Use Never     Social History     Tobacco Use   Smoking Status Never Smoker   Smokeless Tobacco Never Used     Family History: non-contributory    Meds/Allergies   all current active meds have been reviewed  No Known Allergies    Objective   Vitals: Blood pressure 110/62, pulse 65, temperature 97 6 °F (36 4 °C), temperature source Temporal, resp  rate 16, height 5' 4" (1 626 m), weight 82 1 kg (181 lb), SpO2 92 %  , Body mass index is 31 07 kg/m² ,   Orthostatic Blood Pressures      Most Recent Value   Blood Pressure 110/62 filed at 01/11/2022 6873   Patient Position - Orthostatic VS Lying filed at 01/11/2022 0000            Intake/Output Summary (Last 24 hours) at 1/11/2022 1015  Last data filed at 1/11/2022 0837  Gross per 24 hour   Intake 540 ml   Output 600 ml   Net -60 ml               Physical Exam:  Not performed due to COVID status  Lab Results:   No results displayed because visit has over 200 results            Results from last 7 days   Lab Units 01/10/22  0455 01/09/22  0617 01/08/22  0612   CK TOTAL U/L 218 407* 467*   CK MB INDEX % 2 9* 3 1* 1 9     Results from last 7 days   Lab Units 01/11/22  0803 01/10/22  0455 01/09/22  0617   WBC Thousand/uL 5 89 5 80 4 39   HEMOGLOBIN g/dL 11 3* 10 8* 11 4*   HEMATOCRIT % 35 6* 32 7* 35 3*   PLATELETS Thousands/uL 225 315 269         Results from last 7 days   Lab Units 01/11/22  0803 01/10/22  0455 01/09/22  0617   POTASSIUM mmol/L 4 2 4 1 3 9   CHLORIDE mmol/L 106 108 108   CO2 mmol/L 21 25 22   BUN mg/dL 18 15 16   CREATININE mg/dL 0 56* 0 53* 0 57*   CALCIUM mg/dL 8 1* 7 9* 7 8*   ALK PHOS U/L 56 51 53   ALT U/L 88* 88* 106*   AST U/L 63* 61* 84*     Results from last 7 days   Lab Units 01/09/22  0120 01/07/22  0546   INR  1 28* 1 11         Imaging: I have personally reviewed pertinent reports  Counseling / Coordination of Care  Total floor / unit time spent today 60 minutes  Greater than 50% of total time was spent with the patient and / or family counseling and / or coordination of care

## 2022-01-11 NOTE — ASSESSMENT & PLAN NOTE
No O2 requirement at baseline - current hypoxia is in the setting of COVID-19 infection  With improving oxygen requirement currently  See above

## 2022-01-11 NOTE — PROGRESS NOTES
[address] [facility] [address] [place]  Progress Note [place] 1950, 73 y o  male MRN: [id number]  Unit/Bed#: 420-01 Encounter: [id number]  Primary Care Provider: No primary care provider on file  Date and time admitted to hospital: 1/5/2022  4:52 PM    * Pneumonia due to COVID-19 virus  Assessment & Plan  Patient presented with weakness, nausea, fatigue, body aches  Patient is unvaccinated, and acquired COVID infection from spouse who was vaccinated and doing well at home  Reportedly  COVID positive on 12/26/2021  Initially required 2 L by nasal cannula, up to 10L, improved to 4-5 L this morning  Currently being maintained on the moderate COVID-19 infection treatment algorithm -    · CXR 01/05 with changes consistent with COVID-19 infection  · Check daily labs  · Trend CRP - initially at 33 3 --> 14 9  · Procalcitonin negative X 3 - hold off on antibiotics  · D-dimer 1 25 --> 1 01 - was on Heparin gtt due to elevated troponin, changed to DVT prophylaxis  · Concluded course of IV Remdesivir  · Continue 6 mg IV dexamethasone daily day #5  · Continue on Baricitinib day#4  · Patient is paraplegic with peripheral edema - LE venus doppler showed no DVT  · Overall improving  Acute respiratory failure with hypoxia (HCC)  Assessment & Plan  No O2 requirement at baseline - current hypoxia is in the setting of COVID-19 infection  With improving oxygen requirement currently  See above  Elevated troponin  Assessment & Plan  Initial minimal elevation of high sensitivity troponin to a value of 85 likely represented non MI elevation in the setting of infection  Patient had been asymptomatic  Developed atypical chest pain on 01/08 - repeat troponin elevated at 1,618  ECG with NSR, downward deflecting Q-waves in 1/aVL, 2/3/AVF, and V5/V6 - most of which look essentially unchanged from time of admission, and without any ST segment abnormalities      · Heparin gtt X 48 hours - discontinued  · Initiated on daily ASA, high potency statin, and low-dose BB - plan on continuation as outpatient  · Clopidogrel added by Cardiology  · Trended troponin 85 --> 1,618, 2,182  Trended down to 1,249 with patient chest pain-free  · ECHO with LVEF 40%, wall motion abnormalities  · Given findings of CHF will consult Cardiology  · Plan on outpatient nuclear stress test       Systolic congestive heart failure ([facility] )  Assessment & Plan  Wt Readings from Last 3 Encounters:   01/10/22 82 1 kg (181 lb)     ECHO with LVEF 40%, moderate global hypokinesis with regional variations, moderate MR  Troponin spill was in the setting of acute illness and hypoxia, and may represent demand ischemia  Cannot rule out ischemic etiology for patient's underlying cardiomyopathy  · Check daily weights  · Change to low-sodium diet  · Continue low-dose BB, ASA, and statin therapy  · Consider addition of low-dose ACE-inhibitor when patient has stabilized  · Current hypoxia secondary to COVID-19 infection - lower extremity edema in the setting of hypoalbuminemia, reportedly chronic and unchanged  Hold off on diuretic therapy  · Will benefit from an ischemic evaluation, likely once acute illness has resolved  Plan on outpatient nuclear stress test     Neurogenic bladder  Assessment & Plan  Related to paraplegia - patient chronically self caths, currently with Silver catheter in place in the setting of acute illness  Monitor symptoms, discontinue Silver catheter soon - patient is requesting the Silver catheter remain in place for today due to discomfort with self catheterization in the setting of acute illness  Skin ulcer of toe of left foot with fat layer exposed ([facility] )  Assessment & Plan  Followed by wound care regularly  Vascular surgery referral was placed recently  Consult wound care while hospitalized      Skin ulcer of toe of right foot with fat layer exposed [facility])  Assessment & Plan  See management above     Other constipation  Assessment & Plan  Reports a chronic history of constipation, but refusing bowel regimen  PAD (peripheral artery disease) (HCC)  Assessment & Plan  Continue ASA 81 - does not appear to be on statin therapy chronically  Started on atorvastatin given rise in troponin levels  Transaminitis  Assessment & Plan  Mildly elevated LFTs likely in the setting of COVID-19 infection  Continue to monitor  Elevated CPK  Assessment & Plan  Mildly elevated CPK in setting of acute COVID-19 infection  Normalized with IV fluids  Discontinued IV fluids  Paraplegia [facility])  Assessment & Plan  Chronic condition secondary to previous trauma - lives at home and self caths        VTE Pharmacologic Prophylaxis: VTE Score: 7 Moderate Risk (Score 3-4) - Pharmacological DVT Prophylaxis Ordered: enoxaparin (Lovenox)      Patient Centered Rounds: I performed bedside rounds with nursing staff today  Discussions with Specialists or Other Care Team Provider: None     Education and Discussions with Family / Patient: Updated  (wife) via phone      Time Spent for Care: 40 minutes  More than 50% of total time spent on counseling and coordination of care as described above      Current Length of Stay: 6 day(s)  Current Patient Status: Inpatient   Certification Statement: The patient will continue to require additional inpatient hospital stay due to Continue treatment of COVID-19 infection, currently with worsening hypoxia  Discharge Plan: Anticipate discharge in >72 hrs to home      Code Status: Level 1 - Full Code    Subjective:   Patient seen and examined - reports overall improvement in symptoms today, including dyspnea  O2 successfully weaned down to 4 L by mid flow today  No overnight events reported  Remains afebrile      Objective:     Vitals:   Temp (24hrs), Av 8 °F (36 6 °C), Min:97 6 °F (36 4 °C), Max:98 °F (36 7 °C)    Temp:  [97 6 °F (36 4 °C)-98 °F (36 7 °C)] 97 6 °F (36 4 °C)  HR:  [64-72] 65  Resp:  [16-20] 16  BP: (105-122)/(62-67) 110/62  SpO2:  [89 %-94 %] 92 %  Body mass index is 31 07 kg/m²  Input and Output Summary (last 24 hours): Intake/Output Summary (Last 24 hours) at 1/11/2022 1118  Last data filed at 1/11/2022 0837  Gross per 24 hour   Intake 540 ml   Output 600 ml   Net -60 ml       Physical Exam:   Vitals and nursing note reviewed  Constitutional:       General: He is not in acute distress      Appearance: He is well-developed  He is no longer ill-appearing  He is not toxic-appearing  Cardiovascular:      Rate and Rhythm: Normal rate and regular rhythm       Heart sounds: No murmur heard  Pulmonary:      Effort: Pulmonary effort is normal  No respiratory distress       Breath sounds: Normal breath sounds  No wheezing, rhonchi or rales  Abdominal:      General: Abdomen is flat  Bowel sounds are normal       Palpations: Abdomen is soft       Tenderness: There is no abdominal tenderness  There is no guarding or rebound  Musculoskeletal:         General: Swelling present and reportedly chronic  No tenderness  Skin:     General: Skin is warm and dry  Neurological:      General: No focal deficit present       Mental Status: He is alert and oriented to person, place, and time     Psychiatric:         Mood and Affect: Mood normal          Behavior: Behavior normal      Additional Data:     Labs:  Results from last 7 days   Lab Units 01/11/22  0803   WBC Thousand/uL 5 89   HEMOGLOBIN g/dL 11 3*   HEMATOCRIT % 35 6*   PLATELETS Thousands/uL 225   NEUTROS PCT % 89*   LYMPHS PCT % 7*   MONOS PCT % 3*   EOS PCT % 0     Results from last 7 days   Lab Units 01/11/22  0803   SODIUM mmol/L 138   POTASSIUM mmol/L 4 2   CHLORIDE mmol/L 106   CO2 mmol/L 21   BUN mg/dL 18   CREATININE mg/dL 0 56*   ANION GAP mmol/L 11   CALCIUM mg/dL 8 1*   ALBUMIN g/dL 2 7*   TOTAL BILIRUBIN mg/dL 0 68   ALK PHOS U/L 56   ALT U/L 88*   AST U/L 63*   GLUCOSE RANDOM mg/dL 136     Results from last 7 days   Lab Units 01/09/22  0120   INR  1 28*         Results from last 7 days   Lab Units 01/06/22  0432   HEMOGLOBIN A1C % 6 5*     Results from last 7 days   Lab Units 01/07/22  0546 01/06/22  0445 01/06/22  0432 01/05/22  1747   LACTIC ACID mmol/L  --   --   --  1 2   PROCALCITONIN ng/ml <0 05 <0 05 <0 05  --        Lines/Drains:  Invasive Devices  Report    Peripheral Intravenous Line            Peripheral IV 01/08/22 Dorsal (posterior); Left Hand 3 days    Peripheral IV 01/11/22 Right;Ventral (anterior) Forearm <1 day          Drain            Urethral Catheter Straight-tip 14 Fr  5 days              Imaging: No pertinent imaging reviewed  ECHO results reviewed      Recent Cultures (last 7 days):   Results from last 7 days   Lab Units 01/06/22  1432   URINE CULTURE  No Growth <1000 cfu/mL       Last 24 Hours Medication List:   Current Facility-Administered Medications   Medication Dose Route Frequency Provider Last Rate    acetaminophen  650 mg Oral Q6H PRN Brittany Barraza MD      aluminum-magnesium hydroxide-simethicone  30 mL Oral Q6H PRN Brittany Barraza MD      aspirin  81 mg Oral Daily Star Bullard MD      atorvastatin  40 mg Oral QPM Estiven Ford MD      Baricitinib  4 mg Oral Q24H Estiven Ford MD      clopidogrel  75 mg Oral Daily Noah Teran MD      dexamethasone  8 mg Intravenous Q12H [address] Jaison Sanford PA-C      docusate sodium  100 mg Oral BID Brittany Barraza MD      enoxaparin  30 mg Subcutaneous Q12H Alicia Gamble MD      metoprolol tartrate  25 mg Oral Q12H [address] Estiven Ford MD      nitroglycerin  0 4 mg Sublingual Q5 Min PRN Angely Plummer MD      ondansetron  4 mg Intravenous Q6H PRN Brittany Barraza MD      polyethylene glycol  17 g Oral Daily PRN Star Bullard MD      senna  1 tablet Oral HS Estiven Ford MD          Today, Patient Was Seen By: Estiven Ford MD    **Please Note: This note may have been constructed using a voice recognition system  **

## 2022-01-11 NOTE — PLAN OF CARE
Problem: PAIN - ADULT  Goal: Verbalizes/displays adequate comfort level or baseline comfort level  Description: Interventions:  - Encourage patient to monitor pain and request assistance  - Assess pain using appropriate pain scale  - Administer analgesics based on type and severity of pain and evaluate response  - Implement non-pharmacological measures as appropriate and evaluate response  - Consider cultural and social influences on pain and pain management  - Notify physician/advanced practitioner if interventions unsuccessful or patient reports new pain  Outcome: Progressing     Problem: INFECTION - ADULT  Goal: Absence or prevention of progression during hospitalization  Description: INTERVENTIONS:  - Assess and monitor for signs and symptoms of infection  - Monitor lab/diagnostic results  - Monitor all insertion sites, i e  indwelling lines, tubes, and drains  - Monitor endotracheal if appropriate and nasal secretions for changes in amount and color  - [place] appropriate cooling/warming therapies per order  - Administer medications as ordered  - Instruct and encourage patient and family to use good hand hygiene technique  - Identify and instruct in appropriate isolation precautions for identified infection/condition  Outcome: Progressing     Problem: SAFETY ADULT  Goal: Patient will remain free of falls  Description: INTERVENTIONS:  - Educate patient/family on patient safety including physical limitations  - Instruct patient to call for assistance with activity   - Consult OT/PT to assist with strengthening/mobility   - Keep Call bell within reach  - Keep bed low and locked with side rails adjusted as appropriate  - Keep care items and personal belongings within reach  - Initiate and maintain comfort rounds  - Make Fall Risk Sign visible to staff  - Offer Toileting every 2 Hours, in advance of need  - Initiate/Maintain fall alarm  - Obtain necessary fall risk management equipment: alarm, mobility equipment  - Apply yellow socks and bracelet for high fall risk patients  - Consider moving patient to room near nurses station  Outcome: Progressing  Goal: Maintain or return to baseline ADL function  Description: INTERVENTIONS:  -  Assess patient's ability to carry out ADLs; assess patient's baseline for ADL function and identify physical deficits which impact ability to perform ADLs (bathing, care of mouth/teeth, toileting, grooming, dressing, etc )  - Assess/evaluate cause of self-care deficits   - Assess range of motion  - Assess patient's mobility; develop plan if impaired  - Assess patient's need for assistive devices and provide as appropriate  - Encourage maximum independence but intervene and supervise when necessary  - Involve family in performance of ADLs  - Assess for home care needs following discharge   - Consider OT consult to assist with ADL evaluation and planning for discharge  - Provide patient education as appropriate  Outcome: Progressing  Goal: Maintains/Returns to pre admission functional level  Description: INTERVENTIONS:  - Perform BMAT or MOVE assessment daily    - Set and communicate daily mobility goal to care team and patient/family/caregiver  - Collaborate with rehabilitation services on mobility goals if consulted  - Perform Range of Motion 4 times a day  - Reposition patient every 2 hours    - Dangle patient 3 times a day  - Record patient progress and toleration of activity level   Outcome: Progressing     Problem: DISCHARGE PLANNING  Goal: Discharge to home or other facility with appropriate resources  Description: INTERVENTIONS:  - Identify barriers to discharge w/patient and caregiver  - Arrange for needed discharge resources and transportation as appropriate  - Identify discharge learning needs (meds, wound care, etc )  - Arrange for interpretive services to assist at discharge as needed  - Refer to Case Management Department for coordinating discharge planning if the patient needs post-hospital services based on physician/advanced practitioner order or complex needs related to functional status, cognitive ability, or social support system  Outcome: Progressing     Problem: Knowledge Deficit  Goal: Patient/family/caregiver demonstrates understanding of disease process, treatment plan, medications, and discharge instructions  Description: Complete learning assessment and assess knowledge base    Interventions:  - Provide teaching at level of understanding  - Provide teaching via preferred learning methods  Outcome: Progressing     Problem: RESPIRATORY - ADULT  Goal: Achieves optimal ventilation and oxygenation  Description: INTERVENTIONS:  - Assess for changes in respiratory status  - Assess for changes in mentation and behavior  - Position to facilitate oxygenation and minimize respiratory effort  - Oxygen administered by appropriate delivery if ordered  - Initiate smoking cessation education as indicated  - Encourage broncho-pulmonary hygiene including cough, deep breathe, Incentive Spirometry  - Assess the need for suctioning and aspirate as needed  - Assess and instruct to report SOB or any respiratory difficulty  - Respiratory Therapy support as indicated  Outcome: Progressing     Problem: GENITOURINARY - ADULT  Goal: Maintains or returns to baseline urinary function  Description: INTERVENTIONS:  - Assess urinary function  - Encourage oral fluids to ensure adequate hydration if ordered  - Administer IV fluids as ordered to ensure adequate hydration  - Administer ordered medications as needed  - Offer frequent toileting  - Follow urinary retention protocol if ordered  Outcome: Progressing  Goal: Absence of urinary retention  Description: INTERVENTIONS:  - Assess patients ability to void and empty bladder  - Monitor I/O  - Bladder scan as needed  - Discuss with physician/AP medications to alleviate retention as needed  - Discuss catheterization for long term situations as appropriate  Outcome: Progressing  Goal: Urinary catheter remains patent  Description: INTERVENTIONS:  - Assess patency of urinary catheter  - If patient has a chronic palacio, consider changing catheter if non-functioning  - Follow guidelines for intermittent irrigation of non-functioning urinary catheter  Outcome: Progressing     Problem: METABOLIC, FLUID AND ELECTROLYTES - ADULT  Goal: Electrolytes maintained within normal limits  Description: INTERVENTIONS:  - Monitor labs and assess patient for signs and symptoms of electrolyte imbalances  - Administer electrolyte replacement as ordered  - Monitor response to electrolyte replacements, including repeat lab results as appropriate  - Instruct patient on fluid and nutrition as appropriate  Outcome: Progressing  Goal: Fluid balance maintained  Description: INTERVENTIONS:  - Monitor labs   - Monitor I/O and WT  - Instruct patient on fluid and nutrition as appropriate  - Assess for signs & symptoms of volume excess or deficit  Outcome: Progressing     Problem: SKIN/TISSUE INTEGRITY - ADULT  Goal: Incision(s), wounds(s) or drain site(s) healing without S/S of infection  Description: INTERVENTIONS  - Assess and document dressing, incision, wound bed, drain sites and surrounding tissue  - Provide patient and family education  - Perform skin care/dressing changes every day or as ordered  Outcome: Progressing  Goal: Pressure injury heals and does not worsen  Description: Interventions:  - Implement low air loss mattress or specialty surface (Criteria met)  - Apply silicone foam dressing  - Instruct/assist with weight shifting every 120 minutes when in chair   - Limit chair time to 6 hour intervals  - Use special pressure reducing interventions such as weight shifting when in chair   - Apply fecal or urinary incontinence containment device   - Perform passive or active ROM every day QID  - Turn and reposition patient & offload bony prominences every 2 hours   - Utilize friction reducing device or surface for transfers   - Consider consults to  interdisciplinary teams such as PT/OT, nutrition, wound care  - Use incontinent care products after each incontinent episode such as cleanser and barrier cream  - Consider nutrition services referral as needed  Outcome: Progressing     Problem: MUSCULOSKELETAL - ADULT  Goal: Maintain or return mobility to safest level of function  Description: INTERVENTIONS:  - Assess patient's ability to carry out ADLs; assess patient's baseline for ADL function and identify physical deficits which impact ability to perform ADLs (bathing, care of mouth/teeth, toileting, grooming, dressing, etc )  - Assess/evaluate cause of self-care deficits   - Assess range of motion  - Assess patient's mobility  - Assess patient's need for assistive devices and provide as appropriate  - Encourage maximum independence but intervene and supervise when necessary  - Involve family in performance of ADLs  - Assess for home care needs following discharge   - Consider OT consult to assist with ADL evaluation and planning for discharge  - Provide patient education as appropriate  Outcome: Progressing     Problem: Nutrition/Hydration-ADULT  Goal: Nutrient/Hydration intake appropriate for improving, restoring or maintaining nutritional needs  Description: Monitor and assess patient's nutrition/hydration status for malnutrition  Collaborate with interdisciplinary team and initiate plan and interventions as ordered  Monitor patient's weight and dietary intake as ordered or per policy  Utilize nutrition screening tool and intervene as necessary  Determine patient's food preferences and provide high-protein, high-caloric foods as appropriate       INTERVENTIONS:  - Monitor oral intake, urinary output, labs, and treatment plans  - Assess nutrition and hydration status and recommend course of action  - Evaluate amount of meals eaten  - Assist patient with eating if necessary   - Allow adequate time for meals  - Recommend/ encourage appropriate diets, oral nutritional supplements, and vitamin/mineral supplements  - Order, calculate, and assess calorie counts as needed  - Recommend, monitor, and adjust tube feedings and TPN/PPN based on assessed needs  - Assess need for intravenous fluids  - Provide specific nutrition/hydration education as appropriate  - Include patient/family/caregiver in decisions related to nutrition  Outcome: Progressing     Problem: MOBILITY - ADULT  Goal: Maintain or return to baseline ADL function  Description: INTERVENTIONS:  -  Assess patient's ability to carry out ADLs; assess patient's baseline for ADL function and identify physical deficits which impact ability to perform ADLs (bathing, care of mouth/teeth, toileting, grooming, dressing, etc )  - Assess/evaluate cause of self-care deficits   - Assess range of motion  - Assess patient's mobility; develop plan if impaired  - Assess patient's need for assistive devices and provide as appropriate  - Encourage maximum independence but intervene and supervise when necessary  - Involve family in performance of ADLs  - Assess for home care needs following discharge   - Consider OT consult to assist with ADL evaluation and planning for discharge  - Provide patient education as appropriate  Outcome: Progressing  Goal: Maintains/Returns to pre admission functional level  Description: INTERVENTIONS:  - Perform BMAT or MOVE assessment daily    - Set and communicate daily mobility goal to care team and patient/family/caregiver  - Collaborate with rehabilitation services on mobility goals if consulted  - Perform Range of Motion 4 times a day  - Reposition patient every 2 hours    - Dangle patient 3 times a day  - Record patient progress and toleration of activity level   Outcome: Progressing     Problem: Potential for Falls  Goal: Patient will remain free of falls  Description: INTERVENTIONS:  - Educate patient/family on patient safety including physical limitations  - Instruct patient to call for assistance with activity   - Consult OT/PT to assist with strengthening/mobility   - Keep Call bell within reach  - Keep bed low and locked with side rails adjusted as appropriate  - Keep care items and personal belongings within reach  - Initiate and maintain comfort rounds  - Make Fall Risk Sign visible to staff  - Offer Toileting every 2 Hours, in advance of need  - Initiate/Maintain fall alarm  - Obtain necessary fall risk management equipment: alarm, mobility equipment  - Apply yellow socks and bracelet for high fall risk patients  - Consider moving patient to room near nurses station  Outcome: Progressing     Problem: Prexisting or High Potential for Compromised Skin Integrity  Goal: Skin integrity is maintained or improved  Description: INTERVENTIONS:  - Identify patients at risk for skin breakdown  - Assess and monitor skin integrity  - Assess and monitor nutrition and hydration status  - Monitor labs   - Assess for incontinence   - Turn and reposition patient  - Assist with mobility/ambulation  - Relieve pressure over bony prominences  - Avoid friction and shearing  - Provide appropriate hygiene as needed including keeping skin clean and dry  - Evaluate need for skin moisturizer/barrier cream  - Collaborate with interdisciplinary team   - Patient/family teaching  - Consider wound care consult   Outcome: Progressing

## 2022-01-11 NOTE — PLAN OF CARE
Problem: PAIN - ADULT  Goal: Verbalizes/displays adequate comfort level or baseline comfort level  Description: Interventions:  - Encourage patient to monitor pain and request assistance  - Assess pain using appropriate pain scale  - Administer analgesics based on type and severity of pain and evaluate response  - Implement non-pharmacological measures as appropriate and evaluate response  - Consider cultural and social influences on pain and pain management  - Notify physician/advanced practitioner if interventions unsuccessful or patient reports new pain  Outcome: Progressing     Problem: INFECTION - ADULT  Goal: Absence or prevention of progression during hospitalization  Description: INTERVENTIONS:  - Assess and monitor for signs and symptoms of infection  - Monitor lab/diagnostic results  - Monitor all insertion sites, i e  indwelling lines, tubes, and drains  - Monitor endotracheal if appropriate and nasal secretions for changes in amount and color  - Bath appropriate cooling/warming therapies per order  - Administer medications as ordered  - Instruct and encourage patient and family to use good hand hygiene technique  - Identify and instruct in appropriate isolation precautions for identified infection/condition  Outcome: Progressing     Problem: SAFETY ADULT  Goal: Patient will remain free of falls  Description: INTERVENTIONS:  - Educate patient/family on patient safety including physical limitations  - Instruct patient to call for assistance with activity   - Consult OT/PT to assist with strengthening/mobility   - Keep Call bell within reach  - Keep bed low and locked with side rails adjusted as appropriate  - Keep care items and personal belongings within reach  - Initiate and maintain comfort rounds  - Make Fall Risk Sign visible to staff  - Offer Toileting every 2 Hours, in advance of need  - Initiate/Maintain fall alarm  - Obtain necessary fall risk management equipment: alarm, mobility equipment  - Apply yellow socks and bracelet for high fall risk patients  - Consider moving patient to room near nurses station  Outcome: Progressing  Goal: Maintain or return to baseline ADL function  Description: INTERVENTIONS:  -  Assess patient's ability to carry out ADLs; assess patient's baseline for ADL function and identify physical deficits which impact ability to perform ADLs (bathing, care of mouth/teeth, toileting, grooming, dressing, etc )  - Assess/evaluate cause of self-care deficits   - Assess range of motion  - Assess patient's mobility; develop plan if impaired  - Assess patient's need for assistive devices and provide as appropriate  - Encourage maximum independence but intervene and supervise when necessary  - Involve family in performance of ADLs  - Assess for home care needs following discharge   - Consider OT consult to assist with ADL evaluation and planning for discharge  - Provide patient education as appropriate  Outcome: Progressing  Goal: Maintains/Returns to pre admission functional level  Description: INTERVENTIONS:  - Perform BMAT or MOVE assessment daily    - Set and communicate daily mobility goal to care team and patient/family/caregiver  - Collaborate with rehabilitation services on mobility goals if consulted  - Perform Range of Motion 4 times a day  - Reposition patient every 2 hours    - Dangle patient 3 times a day  - Record patient progress and toleration of activity level   Outcome: Progressing     Problem: DISCHARGE PLANNING  Goal: Discharge to home or other facility with appropriate resources  Description: INTERVENTIONS:  - Identify barriers to discharge w/patient and caregiver  - Arrange for needed discharge resources and transportation as appropriate  - Identify discharge learning needs (meds, wound care, etc )  - Arrange for interpretive services to assist at discharge as needed  - Refer to Case Management Department for coordinating discharge planning if the patient needs post-hospital services based on physician/advanced practitioner order or complex needs related to functional status, cognitive ability, or social support system  Outcome: Progressing     Problem: Knowledge Deficit  Goal: Patient/family/caregiver demonstrates understanding of disease process, treatment plan, medications, and discharge instructions  Description: Complete learning assessment and assess knowledge base    Interventions:  - Provide teaching at level of understanding  - Provide teaching via preferred learning methods  Outcome: Progressing     Problem: RESPIRATORY - ADULT  Goal: Achieves optimal ventilation and oxygenation  Description: INTERVENTIONS:  - Assess for changes in respiratory status  - Assess for changes in mentation and behavior  - Position to facilitate oxygenation and minimize respiratory effort  - Oxygen administered by appropriate delivery if ordered  - Initiate smoking cessation education as indicated  - Encourage broncho-pulmonary hygiene including cough, deep breathe, Incentive Spirometry  - Assess the need for suctioning and aspirate as needed  - Assess and instruct to report SOB or any respiratory difficulty  - Respiratory Therapy support as indicated  Outcome: Progressing     Problem: GENITOURINARY - ADULT  Goal: Maintains or returns to baseline urinary function  Description: INTERVENTIONS:  - Assess urinary function  - Encourage oral fluids to ensure adequate hydration if ordered  - Administer IV fluids as ordered to ensure adequate hydration  - Administer ordered medications as needed  - Offer frequent toileting  - Follow urinary retention protocol if ordered  Outcome: Progressing  Goal: Absence of urinary retention  Description: INTERVENTIONS:  - Assess patients ability to void and empty bladder  - Monitor I/O  - Bladder scan as needed  - Discuss with physician/AP medications to alleviate retention as needed  - Discuss catheterization for long term situations as appropriate  Outcome: Progressing  Goal: Urinary catheter remains patent  Description: INTERVENTIONS:  - Assess patency of urinary catheter  - If patient has a chronic palacio, consider changing catheter if non-functioning  - Follow guidelines for intermittent irrigation of non-functioning urinary catheter  Outcome: Progressing     Problem: METABOLIC, FLUID AND ELECTROLYTES - ADULT  Goal: Electrolytes maintained within normal limits  Description: INTERVENTIONS:  - Monitor labs and assess patient for signs and symptoms of electrolyte imbalances  - Administer electrolyte replacement as ordered  - Monitor response to electrolyte replacements, including repeat lab results as appropriate  - Instruct patient on fluid and nutrition as appropriate  Outcome: Progressing  Goal: Fluid balance maintained  Description: INTERVENTIONS:  - Monitor labs   - Monitor I/O and WT  - Instruct patient on fluid and nutrition as appropriate  - Assess for signs & symptoms of volume excess or deficit  Outcome: Progressing     Problem: SKIN/TISSUE INTEGRITY - ADULT  Goal: Incision(s), wounds(s) or drain site(s) healing without S/S of infection  Description: INTERVENTIONS  - Assess and document dressing, incision, wound bed, drain sites and surrounding tissue  - Provide patient and family education  - Perform skin care/dressing changes every day or as ordered  Outcome: Progressing  Goal: Pressure injury heals and does not worsen  Description: Interventions:  - Implement low air loss mattress or specialty surface (Criteria met)  - Apply silicone foam dressing  - Instruct/assist with weight shifting every 120 minutes when in chair   - Limit chair time to 6 hour intervals  - Use special pressure reducing interventions such as weight shifting when in chair   - Apply fecal or urinary incontinence containment device   - Perform passive or active ROM every day QID  - Turn and reposition patient & offload bony prominences every 2 hours   - Utilize friction reducing device or surface for transfers   - Consider consults to  interdisciplinary teams such as PT/OT, nutrition, wound care  - Use incontinent care products after each incontinent episode such as cleanser and barrier cream  - Consider nutrition services referral as needed  Outcome: Progressing     Problem: MUSCULOSKELETAL - ADULT  Goal: Maintain or return mobility to safest level of function  Description: INTERVENTIONS:  - Assess patient's ability to carry out ADLs; assess patient's baseline for ADL function and identify physical deficits which impact ability to perform ADLs (bathing, care of mouth/teeth, toileting, grooming, dressing, etc )  - Assess/evaluate cause of self-care deficits   - Assess range of motion  - Assess patient's mobility  - Assess patient's need for assistive devices and provide as appropriate  - Encourage maximum independence but intervene and supervise when necessary  - Involve family in performance of ADLs  - Assess for home care needs following discharge   - Consider OT consult to assist with ADL evaluation and planning for discharge  - Provide patient education as appropriate  Outcome: Progressing     Problem: Nutrition/Hydration-ADULT  Goal: Nutrient/Hydration intake appropriate for improving, restoring or maintaining nutritional needs  Description: Monitor and assess patient's nutrition/hydration status for malnutrition  Collaborate with interdisciplinary team and initiate plan and interventions as ordered  Monitor patient's weight and dietary intake as ordered or per policy  Utilize nutrition screening tool and intervene as necessary  Determine patient's food preferences and provide high-protein, high-caloric foods as appropriate       INTERVENTIONS:  - Monitor oral intake, urinary output, labs, and treatment plans  - Assess nutrition and hydration status and recommend course of action  - Evaluate amount of meals eaten  - Assist patient with eating if necessary   - Allow adequate time for meals  - Recommend/ encourage appropriate diets, oral nutritional supplements, and vitamin/mineral supplements  - Order, calculate, and assess calorie counts as needed  - Recommend, monitor, and adjust tube feedings and TPN/PPN based on assessed needs  - Assess need for intravenous fluids  - Provide specific nutrition/hydration education as appropriate  - Include patient/family/caregiver in decisions related to nutrition  Outcome: Progressing     Problem: MOBILITY - ADULT  Goal: Maintain or return to baseline ADL function  Description: INTERVENTIONS:  -  Assess patient's ability to carry out ADLs; assess patient's baseline for ADL function and identify physical deficits which impact ability to perform ADLs (bathing, care of mouth/teeth, toileting, grooming, dressing, etc )  - Assess/evaluate cause of self-care deficits   - Assess range of motion  - Assess patient's mobility; develop plan if impaired  - Assess patient's need for assistive devices and provide as appropriate  - Encourage maximum independence but intervene and supervise when necessary  - Involve family in performance of ADLs  - Assess for home care needs following discharge   - Consider OT consult to assist with ADL evaluation and planning for discharge  - Provide patient education as appropriate  Outcome: Progressing  Goal: Maintains/Returns to pre admission functional level  Description: INTERVENTIONS:  - Perform BMAT or MOVE assessment daily    - Set and communicate daily mobility goal to care team and patient/family/caregiver  - Collaborate with rehabilitation services on mobility goals if consulted  - Perform Range of Motion 4 times a day  - Reposition patient every 2 hours    - Dangle patient 3 times a day  - Record patient progress and toleration of activity level   Outcome: Progressing     Problem: Potential for Falls  Goal: Patient will remain free of falls  Description: INTERVENTIONS:  - Educate patient/family on patient safety including physical limitations  - Instruct patient to call for assistance with activity   - Consult OT/PT to assist with strengthening/mobility   - Keep Call bell within reach  - Keep bed low and locked with side rails adjusted as appropriate  - Keep care items and personal belongings within reach  - Initiate and maintain comfort rounds  - Make Fall Risk Sign visible to staff  - Offer Toileting every 2 Hours, in advance of need  - Initiate/Maintain fall alarm  - Obtain necessary fall risk management equipment: alarm, mobility equipment  - Apply yellow socks and bracelet for high fall risk patients  - Consider moving patient to room near nurses station  Outcome: Progressing     Problem: Prexisting or High Potential for Compromised Skin Integrity  Goal: Skin integrity is maintained or improved  Description: INTERVENTIONS:  - Identify patients at risk for skin breakdown  - Assess and monitor skin integrity  - Assess and monitor nutrition and hydration status  - Monitor labs   - Assess for incontinence   - Turn and reposition patient  - Assist with mobility/ambulation  - Relieve pressure over bony prominences  - Avoid friction and shearing  - Provide appropriate hygiene as needed including keeping skin clean and dry  - Evaluate need for skin moisturizer/barrier cream  - Collaborate with interdisciplinary team   - Patient/family teaching  - Consider wound care consult   Outcome: Progressing

## 2022-01-11 NOTE — PHYSICAL THERAPY NOTE
PHYSICAL THERAPY NOTE          Patient Name: Hillary KERN Date: 1/11/2022 01/11/22 1030   Note Type   Note Type Treatment   Restrictions/Precautions   Weight Bearing Precautions Per Order No   Other Precautions Pain; Fall Risk   General   Family/Caregiver Present No   Cognition   Overall Cognitive Status WFL   Arousal/Participation Alert; Cooperative   Following Commands Follows all commands and directions without difficulty   Subjective   Subjective Agreeable to therapy  reports his buttocks are sore and its been a problem for a long time  Bed Mobility   Rolling R 6  Modified independent   Additional items Assist x 1;Bedrails   Rolling L 6  Modified independent   Additional items Bedrails   Supine to Sit 6  Modified independent   Additional items Bedrails; Increased time required   Sit to Supine 6  Modified independent   Additional items Bedrails; Increased time required   Additional Comments sat EOB with fair+ sitting balance x 10 min  Declined therapeutic exercises  States he stopped that years ago  Trialed cushion for pressure relief  Pt  declined reported no relief with cushion  Transfers   Additional Comments declines transfers OOB to chair  returned to supine position   Balance   Static Sitting Fair +   Dynamic Sitting Fair +   Static Standing Zero   Endurance Deficit   Endurance Deficit Yes   Activity Tolerance   Activity Tolerance Patient limited by fatigue;Patient limited by pain   Assessment   Prognosis Good   Problem List Decreased strength;Decreased endurance; Impaired balance;Decreased mobility   Assessment Pt  seen for PT treatment session this date with interventions consisting of   bed mobility w/ emphasis on improving pt's function  Completes bed mobility (SUP/Mod I)  Declined OOB to chair  Please also refer to physical therapy recommendation for safe DC planning    In comparison to previous session, Pt  With improvements in activity tolerance  Pt is in need of continued activity in PT to improve strength balance endurance mobility transfers and ambulation with return to maximize LOF  From PT/mobility standpoint, recommendation at time of d/c would be home health rehab  in order to promote return to PLOF and independence  The patient's AM-PAC Basic Mobility Inpatient Short Form Raw Score is 13  A Raw score of less than or equal to 16 suggests the patient may benefit from discharge to post-acute rehabilitation services  Goals   LTG Expiration Date 01/21/22   Plan   Treatment/Interventions Functional transfer training;LE strengthening/ROM; Therapeutic exercise; Endurance training;Bed mobility   Progress Slow progress, decreased activity tolerance   Recommendation   PT Discharge Recommendation Home with home health rehabilitation   [facility] 435   Turning in Bed Without Bedrails 4   Lying on Back to Sitting on Edge of Flat Bed 4   Moving Bed to Chair 2   Standing Up From Chair 1   Walk in Room 1   Climb 3-5 Stairs 1   Basic Mobility Inpatient Raw Score 13   Basic Mobility Standardized Score 33 99   Highest Level Of Mobility   [facility] Goal 4: Move to chair/commode   [facility] Highest Level of Mobility 3: Sit at edge of bed   [facility] Goal Achieved No   Education   Education Provided Mobility training   Patient Reinforcement needed   End of Consult   Patient Position at End of Consult Supine; All needs within reach   End of Consult Comments discussed POC with PT

## 2022-01-11 NOTE — PROGRESS NOTES
Progress Note - Pulmonary   Piedra Hum 73 y o  male MRN: [id number]  Unit/Bed#: 420-01 [id number]    Assessment/Plan:    1  Acute hypoxic respiratory failure   Significantly improved today-currently on 3 L nasal cannula my presence without desaturations   Titrate oxygen to maintain SpO2 greater than or equal to 88%   Pulmonary toilet:  Increase activity as tolerated, out of bed as tolerated, cough and deep breathing encouraged, incentive spirometry q 1 hour, lateral decubitus position encouraged   Patient unable to pro   Will eventually need home O2 eval  2  COVID-19 pneumonia   Tested positive 01/05/2022, symptom onset 12/26/2021  Unvaccinated   Continue baricitinib day#4/14   Completed remdesivir yesterday   Received Decadron 8 mg IV daily this morning  Would discontinue b i d  Dosing and start 6 mg IV daily starting tomorrow   Anticoagulation change to prophylactic Lovenox per primary team, agree   Monitor off antibiotics  3  Hemoptysis   Resolved today   Continue to monitor  4  Elevated troponin   Likely non mi a troponin elevation in the setting of COVID/hypoxia   EKG without ischemic changes   Troponin trending now  [facility] not remember pending inpatient ischemic workup currently but will need outpatient Lexiscan  5  Paraplegia   Noted, increased risk of VTE given decreased mobility however venous duplex this admission without acute or chronic DVT of bilateral lower extremities   Further treatment per primary team     Patient is stable from a pulmonary standpoint  Will sign off  Call if questions  Chief Complaint:    "My breathing is better today "    Subjective:    Patient was seen and examined today  No overnight events reported  Oxygenation is improved today and weaned to 3 L my baseline without desaturations or increased respiratory distress during my evaluation  Hemoptysis is resolved  Reports dyspnea with activities that is improving    No chest tightness or chest pain today  No fevers or chills  Objective:    Vitals: Blood pressure 135/73, pulse 75, temperature 98 1 °F (36 7 °C), temperature source Temporal, resp  rate 18, height 5' 4" (1 626 m), weight 82 1 kg (181 lb), SpO2 93 %  3L NC,Body mass index is 31 07 kg/m²  Intake/Output Summary (Last 24 hours) at 1/11/2022 1537  Last data filed at 1/11/2022 1500  Gross per 24 hour   Intake 360 ml   Output 1700 ml   Net -1340 ml       Invasive Devices  Report    Peripheral Intravenous Line            Peripheral IV 01/08/22 Dorsal (posterior); Left Hand 3 days    Peripheral IV 01/11/22 Right;Ventral (anterior) Forearm <1 day          Drain            Urethral Catheter Straight-tip 14 Fr  5 days                Physical Exam:     Physical Exam  Vitals and nursing note reviewed  Constitutional:       General: He is not in acute distress  Appearance: Normal appearance  HENT:      Head: Normocephalic and atraumatic  Right Ear: External ear normal       Left Ear: External ear normal       Nose: Nose normal       Mouth/Throat:      Mouth: Mucous membranes are moist       Pharynx: Oropharynx is clear  Eyes:      Extraocular Movements: Extraocular movements intact  Conjunctiva/sclera: Conjunctivae normal       Pupils: Pupils are equal, round, and reactive to light  Cardiovascular:      Rate and Rhythm: Normal rate and regular rhythm  Pulses: Normal pulses  Heart sounds: No murmur heard  Pulmonary:      Effort: Pulmonary effort is normal  No respiratory distress  Breath sounds: No wheezing, rhonchi or rales  Abdominal:      General: Bowel sounds are normal       Palpations: Abdomen is soft  There is no mass  Tenderness: There is no abdominal tenderness  Hernia: No hernia is present  Musculoskeletal:         General: No tenderness or deformity  Normal range of motion  Cervical back: Normal range of motion and neck supple  No muscular tenderness        Right lower leg: No edema  Left lower leg: No edema  Skin:     General: Skin is warm and dry  Neurological:      General: No focal deficit present  Mental Status: He is alert and oriented to person, place, and time  Mental status is at baseline  Psychiatric:         Mood and Affect: Mood normal          Behavior: Behavior normal          Thought Content: Thought content normal          Judgment: Judgment normal          Labs: I have personally reviewed pertinent lab results  , ABG: No results found for: PHART, SWN7JET, PO2ART, QOI4LZB, C2UEWBVR, BEART, SOURCE, BNP: No results found for: BNP, CBC:   Lab Results   Component Value Date    WBC 5 89 01/11/2022    HGB 11 3 (L) 01/11/2022    HCT 35 6 (L) 01/11/2022    MCV 90 01/11/2022     01/11/2022    MCH 28 7 01/11/2022    MCHC 31 7 01/11/2022    RDW 13 3 01/11/2022    MPV 11 0 01/11/2022    NRBC 0 01/11/2022   , CMP:   Lab Results   Component Value Date    SODIUM 138 01/11/2022    K 4 2 01/11/2022     01/11/2022    CO2 21 01/11/2022    BUN 18 01/11/2022    CREATININE 0 56 (L) 01/11/2022    CALCIUM 8 1 (L) 01/11/2022    AST 63 (H) 01/11/2022    ALT 88 (H) 01/11/2022    ALKPHOS 56 01/11/2022    EGFR 105 01/11/2022   , PT/INR: No results found for: PT, INR, Troponin: No results found for: TROPONINI    Imaging and other studies: none to review today

## 2022-01-11 NOTE — ASSESSMENT & PLAN NOTE
Wt Readings from Last 3 Encounters:   01/10/22 82 1 kg (181 lb)     ECHO with LVEF 40%, moderate global hypokinesis with regional variations, moderate MR  Troponin spill was in the setting of acute illness and hypoxia, and may represent demand ischemia  Cannot rule out ischemic etiology for patient's underlying cardiomyopathy  · Check daily weights  · Change to low-sodium diet  · Continue low-dose BB, ASA, and statin therapy  · Consider addition of low-dose ACE-inhibitor when patient has stabilized  · Current hypoxia secondary to COVID-19 infection - lower extremity edema in the setting of hypoalbuminemia, reportedly chronic and unchanged  Hold off on diuretic therapy  · Will benefit from an ischemic evaluation, likely once acute illness has resolved    Plan on outpatient nuclear stress test

## 2022-01-11 NOTE — ASSESSMENT & PLAN NOTE
Initial minimal elevation of high sensitivity troponin to a value of 85 likely represented non MI elevation in the setting of infection  Patient had been asymptomatic  Developed atypical chest pain on 01/08 - repeat troponin elevated at 1,618  ECG with NSR, downward deflecting Q-waves in 1/aVL, 2/3/AVF, and V5/V6 - most of which look essentially unchanged from time of admission, and without any ST segment abnormalities  · Heparin gtt X 48 hours - discontinued  · Initiated on daily ASA, high potency statin, and low-dose BB - plan on continuation as outpatient  · Clopidogrel added by Cardiology  · Trended troponin 85 --> 1,618, 2,182  Trended down to 1,249 with patient chest pain-free  · ECHO with LVEF 40%, wall motion abnormalities  · Given findings of CHF will consult Cardiology    · Plan on outpatient nuclear stress test

## 2022-01-11 NOTE — PLAN OF CARE
Problem: PHYSICAL THERAPY ADULT  Goal: Performs mobility at highest level of function for planned discharge setting  See evaluation for individualized goals  Description: Treatment/Interventions: Functional transfer training,Endurance training,Bed mobility          See flowsheet documentation for full assessment, interventions and recommendations  Outcome: Progressing  Note: Prognosis: Good  Problem List: Decreased strength,Decreased endurance,Impaired balance,Decreased mobility  Assessment: Pt  seen for PT treatment session this date with interventions consisting of   bed mobility w/ emphasis on improving pt's function  Completes bed mobility (SUP/Mod I)  Declined OOB to chair  Please also refer to physical therapy recommendation for safe DC planning  In comparison to previous session, Pt  With improvements in activity tolerance  Pt is in need of continued activity in PT to improve strength balance endurance mobility transfers and ambulation with return to maximize LOF  From PT/mobility standpoint, recommendation at time of d/c would be home health rehab  in order to promote return to PLOF and independence  The patient's AM-PAC Basic Mobility Inpatient Short Form Raw Score is 13  A Raw score of less than or equal to 16 suggests the patient may benefit from discharge to post-acute rehabilitation services  PT Discharge Recommendation: Home with home health rehabilitation          See flowsheet documentation for full assessment

## 2022-01-11 NOTE — ASSESSMENT & PLAN NOTE
Patient presented with weakness, nausea, fatigue, body aches  Patient is unvaccinated, and acquired COVID infection from spouse who was vaccinated and doing well at home  Reportedly  COVID positive on 12/26/2021  Initially required 2 L by nasal cannula, up to 10L, improved to 4-5 L this morning  Currently being maintained on the moderate COVID-19 infection treatment algorithm -    · CXR 01/05 with changes consistent with COVID-19 infection  · Check daily labs  · Trend CRP - initially at 33 3 --> 14 9  · Procalcitonin negative X 3 - hold off on antibiotics  · D-dimer 1 25 --> 1 01 - was on Heparin gtt due to elevated troponin, changed to DVT prophylaxis  · Concluded course of IV Remdesivir  · Continue 6 mg IV dexamethasone daily day #5  · Continue on Baricitinib day#4  · Patient is paraplegic with peripheral edema - LE venus doppler showed no DVT  · Overall improving

## 2022-01-11 NOTE — ASSESSMENT & PLAN NOTE
Related to paraplegia - patient chronically self caths, currently with Silver catheter in place in the setting of acute illness  Monitor symptoms, discontinue Silver catheter soon - patient is requesting the Silver catheter remain in place for today due to discomfort with self catheterization in the setting of acute illness

## 2022-01-12 VITALS
OXYGEN SATURATION: 93 % | RESPIRATION RATE: 20 BRPM | BODY MASS INDEX: 31.13 KG/M2 | WEIGHT: 182.32 LBS | DIASTOLIC BLOOD PRESSURE: 68 MMHG | TEMPERATURE: 97.9 F | HEIGHT: 64 IN | HEART RATE: 93 BPM | SYSTOLIC BLOOD PRESSURE: 122 MMHG

## 2022-01-12 LAB
ALBUMIN SERPL BCP-MCNC: 2.8 G/DL (ref 3.5–5)
ALP SERPL-CCNC: 52 U/L (ref 46–116)
ALT SERPL W P-5'-P-CCNC: 74 U/L (ref 12–78)
ANION GAP SERPL CALCULATED.3IONS-SCNC: 11 MMOL/L (ref 4–13)
AST SERPL W P-5'-P-CCNC: 43 U/L (ref 5–45)
BASOPHILS # BLD AUTO: 0.01 THOUSANDS/ΜL (ref 0–0.1)
BASOPHILS NFR BLD AUTO: 0 % (ref 0–1)
BILIRUB SERPL-MCNC: 0.62 MG/DL (ref 0.2–1)
BUN SERPL-MCNC: 18 MG/DL (ref 5–25)
CALCIUM ALBUM COR SERPL-MCNC: 9.2 MG/DL (ref 8.3–10.1)
CALCIUM SERPL-MCNC: 8.2 MG/DL (ref 8.3–10.1)
CHLORIDE SERPL-SCNC: 104 MMOL/L (ref 100–108)
CO2 SERPL-SCNC: 24 MMOL/L (ref 21–32)
CREAT SERPL-MCNC: 0.6 MG/DL (ref 0.6–1.3)
DME PARACHUTE DELIVERY DATE ACTUAL: NORMAL
DME PARACHUTE DELIVERY DATE EXPECTED: NORMAL
DME PARACHUTE DELIVERY DATE REQUESTED: NORMAL
DME PARACHUTE ITEM DESCRIPTION: NORMAL
DME PARACHUTE ORDER STATUS: NORMAL
DME PARACHUTE SUPPLIER NAME: NORMAL
DME PARACHUTE SUPPLIER PHONE: NORMAL
EOSINOPHIL # BLD AUTO: 0 THOUSAND/ΜL (ref 0–0.61)
EOSINOPHIL NFR BLD AUTO: 0 % (ref 0–6)
ERYTHROCYTE [DISTWIDTH] IN BLOOD BY AUTOMATED COUNT: 13.2 % (ref 11.6–15.1)
GFR SERPL CREATININE-BSD FRML MDRD: 102 ML/MIN/1.73SQ M
GLUCOSE SERPL-MCNC: 129 MG/DL (ref 65–140)
HCT VFR BLD AUTO: 32.4 % (ref 36.5–49.3)
HGB BLD-MCNC: 11 G/DL (ref 12–17)
IMM GRANULOCYTES # BLD AUTO: 0.16 THOUSAND/UL (ref 0–0.2)
IMM GRANULOCYTES NFR BLD AUTO: 2 % (ref 0–2)
LYMPHOCYTES # BLD AUTO: 0.79 THOUSANDS/ΜL (ref 0.6–4.47)
LYMPHOCYTES NFR BLD AUTO: 8 % (ref 14–44)
MAGNESIUM SERPL-MCNC: 2.2 MG/DL (ref 1.6–2.6)
MCH RBC QN AUTO: 29.3 PG (ref 26.8–34.3)
MCHC RBC AUTO-ENTMCNC: 34 G/DL (ref 31.4–37.4)
MCV RBC AUTO: 86 FL (ref 82–98)
MONOCYTES # BLD AUTO: 0.95 THOUSAND/ΜL (ref 0.17–1.22)
MONOCYTES NFR BLD AUTO: 9 % (ref 4–12)
NEUTROPHILS # BLD AUTO: 8.51 THOUSANDS/ΜL (ref 1.85–7.62)
NEUTS SEG NFR BLD AUTO: 81 % (ref 43–75)
NRBC BLD AUTO-RTO: 0 /100 WBCS
PLATELET # BLD AUTO: 371 THOUSANDS/UL (ref 149–390)
PMV BLD AUTO: 11.1 FL (ref 8.9–12.7)
POTASSIUM SERPL-SCNC: 3.6 MMOL/L (ref 3.5–5.3)
PROT SERPL-MCNC: 6.2 G/DL (ref 6.4–8.2)
RBC # BLD AUTO: 3.76 MILLION/UL (ref 3.88–5.62)
SODIUM SERPL-SCNC: 139 MMOL/L (ref 136–145)
WBC # BLD AUTO: 10.42 THOUSAND/UL (ref 4.31–10.16)

## 2022-01-12 PROCEDURE — 99239 HOSP IP/OBS DSCHRG MGMT >30: CPT | Performed by: INTERNAL MEDICINE

## 2022-01-12 PROCEDURE — 94761 N-INVAS EAR/PLS OXIMETRY MLT: CPT

## 2022-01-12 PROCEDURE — 80053 COMPREHEN METABOLIC PANEL: CPT | Performed by: STUDENT IN AN ORGANIZED HEALTH CARE EDUCATION/TRAINING PROGRAM

## 2022-01-12 PROCEDURE — 94760 N-INVAS EAR/PLS OXIMETRY 1: CPT

## 2022-01-12 PROCEDURE — 83735 ASSAY OF MAGNESIUM: CPT | Performed by: INTERNAL MEDICINE

## 2022-01-12 PROCEDURE — 85025 COMPLETE CBC W/AUTO DIFF WBC: CPT | Performed by: INTERNAL MEDICINE

## 2022-01-12 RX ORDER — NITROGLYCERIN 0.4 MG/1
0.4 TABLET SUBLINGUAL
Qty: 9 TABLET | Refills: 0 | Status: SHIPPED | OUTPATIENT
Start: 2022-01-12

## 2022-01-12 RX ORDER — CLOPIDOGREL BISULFATE 75 MG/1
75 TABLET ORAL DAILY
Qty: 30 TABLET | Refills: 0 | Status: ON HOLD | OUTPATIENT
Start: 2022-01-13 | End: 2022-02-20 | Stop reason: SDUPTHER

## 2022-01-12 RX ORDER — ASPIRIN 81 MG/1
81 TABLET ORAL DAILY
Refills: 0 | Status: ON HOLD
Start: 2022-01-13 | End: 2022-02-20 | Stop reason: SDUPTHER

## 2022-01-12 RX ORDER — DEXAMETHASONE 6 MG/1
6 TABLET ORAL 2 TIMES DAILY WITH MEALS
Qty: 4 TABLET | Refills: 0 | Status: SHIPPED | OUTPATIENT
Start: 2022-01-12 | End: 2022-02-11

## 2022-01-12 RX ORDER — PANTOPRAZOLE SODIUM 40 MG/1
40 TABLET, DELAYED RELEASE ORAL
Qty: 30 TABLET | Refills: 0 | Status: SHIPPED | OUTPATIENT
Start: 2022-01-13 | End: 2022-02-11

## 2022-01-12 RX ORDER — POTASSIUM CHLORIDE 20 MEQ/1
40 TABLET, EXTENDED RELEASE ORAL ONCE
Status: COMPLETED | OUTPATIENT
Start: 2022-01-12 | End: 2022-01-12

## 2022-01-12 RX ORDER — ATORVASTATIN CALCIUM 40 MG/1
40 TABLET, FILM COATED ORAL EVERY EVENING
Qty: 30 TABLET | Refills: 0 | Status: SHIPPED | OUTPATIENT
Start: 2022-01-12 | End: 2022-02-11

## 2022-01-12 RX ADMIN — ENOXAPARIN SODIUM 30 MG: 30 INJECTION SUBCUTANEOUS at 09:53

## 2022-01-12 RX ADMIN — DOCUSATE SODIUM 100 MG: 100 CAPSULE ORAL at 09:54

## 2022-01-12 RX ADMIN — ATORVASTATIN CALCIUM 40 MG: 40 TABLET, FILM COATED ORAL at 18:05

## 2022-01-12 RX ADMIN — BARICITINIB 4 MG: 2 TABLET, FILM COATED ORAL at 12:48

## 2022-01-12 RX ADMIN — PANTOPRAZOLE SODIUM 40 MG: 40 TABLET, DELAYED RELEASE ORAL at 05:49

## 2022-01-12 RX ADMIN — CLOPIDOGREL BISULFATE 75 MG: 75 TABLET ORAL at 09:54

## 2022-01-12 RX ADMIN — DEXAMETHASONE SODIUM PHOSPHATE 6 MG: 4 INJECTION, SOLUTION INTRA-ARTICULAR; INTRALESIONAL; INTRAMUSCULAR; INTRAVENOUS; SOFT TISSUE at 09:53

## 2022-01-12 RX ADMIN — POTASSIUM CHLORIDE 40 MEQ: 1500 TABLET, EXTENDED RELEASE ORAL at 09:57

## 2022-01-12 RX ADMIN — METOPROLOL TARTRATE 25 MG: 25 TABLET, FILM COATED ORAL at 09:54

## 2022-01-12 NOTE — PLAN OF CARE
Problem: PAIN - ADULT  Goal: Verbalizes/displays adequate comfort level or baseline comfort level  Description: Interventions:  - Encourage patient to monitor pain and request assistance  - Assess pain using appropriate pain scale  - Administer analgesics based on type and severity of pain and evaluate response  - Implement non-pharmacological measures as appropriate and evaluate response  - Consider cultural and social influences on pain and pain management  - Notify physician/advanced practitioner if interventions unsuccessful or patient reports new pain  Outcome: Progressing     Problem: INFECTION - ADULT  Goal: Absence or prevention of progression during hospitalization  Description: INTERVENTIONS:  - Assess and monitor for signs and symptoms of infection  - Monitor lab/diagnostic results  - Monitor all insertion sites, i e  indwelling lines, tubes, and drains  Problem: INFECTION - ADULT  Goal: Absence or prevention of progression during hospitalization  Description: INTERVENTIONS:  - Assess and monitor for signs and symptoms of infection  - Monitor lab/diagnostic results  - Monitor all insertion sites, i e  indwelling lines, tubes, and drains    Problem: SAFETY ADULT  Goal: Patient will remain free of falls  Description: INTERVENTIONS:  - Educate patient/family on patient safety including physical limitations  - Instruct patient to call for assistance with activity   - Consult OT/PT to assist with strengthening/mobility   - Keep Call bell within reach  - Keep bed low and locked with side rails adjusted as appropriate  - Keep care items and personal belongings within reach  - Initiate and maintain comfort rounds  - Make Fall Risk Sign visible to staff  - Offer Toileting every 2 Hours, in advance of need  - Initiate/Maintain fall alarm  - Obtain necessary fall risk management equipment: alarm, mobility equipment  - Apply yellow socks and bracelet for high fall risk patients  - Consider moving patient to room near nurses station  Outcome: Progressing  Goal: Maintain or return to baseline ADL function  Description: INTERVENTIONS:  -  Assess patient's ability to carry out ADLs; assess patient's baseline for ADL function and identify physical deficits which impact ability to perform ADLs (bathing, care of mouth/teeth, toileting, grooming, dressing, etc )  - Assess/evaluate cause of self-care deficits   - Assess range of motion  - Assess patient's mobility; develop plan if impaired  - Assess patient's need for assistive devices and provide as appropriate  - Encourage maximum independence but intervene and supervise when necessary  - Involve family in performance of ADLs  - Assess for home care needs following discharge   - Consider OT consult to assist with ADL evaluation and planning for discharge  - Provide patient education as appropriate  Outcome: Progressing  Goal: Maintains/Returns to pre admission functional level  Description: INTERVENTIONS:  - Perform BMAT or MOVE assessment daily    - Set and communicate daily mobility goal to care team and patient/family/caregiver  - Collaborate with rehabilitation services on mobility goals if consulted  - Perform Range of Motion 4 times a day  - Reposition patient every 2 hours    - Dangle patient 3 times a day  - Record patient progress and toleration of activity level   Outcome: Smith Ortiz appropriate cooling/warming therapies per order  - Administer medications as ordered  - Instruct and encourage patient and family to use good hand hygiene technique  - Identify and instruct in appropriate isolation precautions for identified infection/condition  Outcome: Smith Ortiz appropriate cooling/warming therapies per order  - Administer medications as ordered  - Instruct and encourage patient and family to use good hand hygiene technique  - Identify and instruct in appropriate isolation precautions for identified infection/condition  Outcome: Progressing

## 2022-01-12 NOTE — ASSESSMENT & PLAN NOTE
Reports a chronic history of constipation, but refused bowel regimen  Was requesting a specialized rehab commode unavailable at the hospital   Attempted to procure specialty commode, but as patient was being discharged and the commode would not arrive in time, patient is being discharged home as per his request to resume his home bowel regimen

## 2022-01-12 NOTE — DISCHARGE SUMMARY
[facility] [address] [place]  Discharge- Tiffany Dun 1950, 73 y o  male MRN: [id number]  Unit/Bed#: 420-01 Encounter: [id number]  Primary Care Provider: No primary care provider on file  Date and time admitted to hospital: 1/5/2022  4:52 PM    * Pneumonia due to COVID-19 virus  Assessment & Plan  Patient presented with weakness, nausea, fatigue, and body aches - unvaccinated, and acquired  infection from spouse who was vaccinated and doing well at home  Reportedly  COVID positive on 12/26/2021  Initially required 2 L by nasal cannula, up to 10L, improved to 2L this morning  Was maintained on the moderate treatment algorithm -    · CXR 01/05 with COVID-19 infection  · Checked daily labs  · Trend CRP - initially at 33 3 --> 14 9  · Procalcitonin negative X 3 - no antibiotics  · D-dimer 1 25 --> 1 01 - was on Heparin gtt due to elevated troponin, changed to DVT prophylaxis  · Concluded course of IV Remdesivir  · Received 6 days of 6 mg IV dexamethasone  Will finish 4 additional days with oral Decadron  · Was maintained on Baricitinib for 5 days  · Patient is paraplegic with peripheral edema - LE venus doppler showed no DVT  · Significantly improved  Will discharge on 2 L of continuous O2, with follow-up with pulmonology to wean off supplemental oxygen when appropriate  Acute respiratory failure with hypoxia (HCC)  Assessment & Plan  No O2 requirement at baseline - current hypoxia is in the setting of COVID-19 infection  With improving oxygen requirement currently  See above  Elevated troponin  Assessment & Plan  Initial minimal elevation of high sensitivity troponin to a value of 85 likely represented non MI elevation in the setting of infection  Patient had been asymptomatic  Developed atypical chest pain on 01/08 - repeat troponin elevated at 1,618   ECG with NSR, downward deflecting Q-waves in 1/aVL, 2/3/AVF, and V5/V6 - most of which look essentially unchanged from time of admission, and without any ST segment abnormalities  · Heparin gtt X 48 hours - discontinued  · Initiated on daily ASA, high potency statin, and low-dose BB - plan on continuation as outpatient  · Clopidogrel added by Cardiology which will also be continued  · At daily PPI for GI prophylaxis  · Trended troponin 85 --> 1,618, 2,182  Trended down to 1,249 with patient chest pain-free  · ECHO with LVEF 40%, wall motion abnormalities  · Given findings of CHF consult Cardiology  · Plan on outpatient nuclear stress test and outpatient follow-up with Cardiology  Systolic congestive heart failure (HCC)  Assessment & Plan  Wt Readings from Last 3 Encounters:   01/12/22 82 7 kg (182 lb 5 1 oz)     ECHO with LVEF 40%, moderate global hypokinesis with regional variations, moderate MR  Troponin spill was in the setting of acute illness and hypoxia, and may represent demand ischemia  Cannot rule out ischemic etiology for patient's underlying cardiomyopathy  · Check daily weights - currently stable at 82 kg  · Change to low-sodium diet  · Continue low-dose BB, ASA, and statin therapy  Cardiology also added clopidogrel  · Consider addition of low-dose ACE-inhibitor when patient has stabilized  · Current hypoxia secondary to COVID-19 infection - lower extremity edema in the setting of hypoalbuminemia, reportedly chronic and unchanged  Hold off on diuretic therapy  · Will benefit from an ischemic evaluation once acute illness has resolved  Plan on outpatient nuclear stress test     Neurogenic bladder  Assessment & Plan  Related to paraplegia - patient chronically self caths, currently with Silver catheter in place in the setting of acute illness which was maintained at patient's request due to discomfort with self catheterization in the setting of acute illness  Will discontinue prior to discharge      Skin ulcer of toe of left foot with fat layer exposed ([address] )  Assessment & Plan  Followed by wound care regularly  Vascular surgery referral was placed recently  Consulted wound care while hospitalized  Skin and Wound Care Plan:   1  Ehob offloading cushion to chair when OOB  2  Elevate heels off the bed with pillows   3  Accumax pump to mattress  4  Hydraguard to bilateral heels, buttocks and sacrum BID and PRN  5  Skin nourishing cream daily  6  Allevyn life silicone bordered dressing to the right lateral-posterior thigh, charley with T, date, peel back daily for skin assessment, reapply dressing, change every 3 days and PRN with soilage or dislodgment     Patient given Hydraguard with instructions for use at home to protect right posterior thigh skin    Skin ulcer of toe of right foot with fat layer exposed [facility])  Assessment & Plan  See management above  Other constipation  Assessment & Plan  Reports a chronic history of constipation, but refused bowel regimen  Was requesting a specialized rehab commode unavailable at the hospital   Attempted to procure specialty commode, but as patient was being discharged and the commode would not arrive in time, patient is being discharged home as per his request to resume his home bowel regimen  PAD (peripheral artery disease) (HCC)  Assessment & Plan  Continue ASA 81 - does not appear to be on statin therapy chronically  Started on atorvastatin given rise in troponin levels  Clopidogrel also added per Cardiology recommendations due to suspected underlying CAD  Transaminitis  Assessment & Plan  Mildly elevated LFTs likely in the setting of COVID-19 infection  Resolved  Elevated CPK  Assessment & Plan  Mildly elevated CPK in setting of acute COVID-19 infection  Normalized with IV fluids  Discontinued IV fluids  Paraplegia [facility])  Assessment & Plan  Chronic condition secondary to previous trauma - lives at home and self caths        Medical Problems             Resolved Problems  Date Reviewed: 1/12/2022          Resolved    Acute cystitis without hematuria 1/7/2022     Resolved by  Rashid Baptiste MD    Hypoxia 1/8/2022     Resolved by  Tasia Quintanilla MD              Discharging Physician / Practitioner: Tasia Quintanilla MD  PCP: No primary care provider on file  Admission Date:   Admission Orders (From admission, onward)     Ordered        01/06/22 1553  Inpatient Admission  Once            01/05/22 2122  Place in Observation  Once                      Discharge Date: 01/12/22    Consultations During Hospital Stay:  · Pulmonology  · Cardiology    Procedures Performed:   · None    Significant Findings / Test Results:   XR chest portable    Result Date: 1/9/2022  Impression: Increasing bilateral groundglass opacities since the prior study, again consistent with Covid 19 pneumonia  Workstation performed: [id number]     XR chest 1 view portable    Result Date: 1/6/2022  Impression: Patchy bilateral groundglass opacities in keeping with Covid associated pneumonia  Workstation performed: [id number]     [facility] REPORT  CLINICAL:  Indications:  Patient presents to the ED with Covid, Fever of >102 and Nausea  He is a  paraplegic but denies any symptoms of DVT at this time  D-Dimer is elevated  CONCLUSION:     Impression:  RIGHT LOWER LIMB:  No gross evidence of acute or chronic deep vein thrombosis  No gross evidence of superficial thrombophlebitis noted  LEFT LOWER LIMB:  No gross evidence of acute or chronic deep vein thrombosis  No gross evidence of superficial thrombophlebitis noted  ECHO 1/10/2022: Interpretation Summary      Left Ventricle: Left ventricular cavity size is normal  Wall thickness is increased  The left ventricular ejection fraction is 40%  Systolic function is moderately reduced  There is moderate global hypokinesis with regional variation  There is mild concentric hypertrophy    Left Atrium: The atrium is mildly dilated    Mitral Valve: There is moderate regurgitation    Tricuspid Valve:  There is mild regurgitation    The estimated pulmonary artery systolic pressure is 01 2 mmHg  Incidental Findings:   · As above     Test Results Pending at Discharge (will require follow up): · None     Outpatient Tests Requested:  · Nuclear Stress Test    Complications:  None    Reason for Admission: COVID-19 Pneumonia, Acute Hypoxic Respiratory Failure    HPI from original H&P:     Bruna Soto is a 73 y o  male with a PMH of paraplegia, PAD, and bilateral lower extremity ulcers who presents with weakness, shortness of breath, nausea, fatigue and body aches  He was tested positive with COVID on 12/26/2021  States that he had a temperature of 102° at home and has been seen his oxygen saturations in the 80s  He also admits to nausea without episodes of emesis  He needs to self cath due to paraplegia and takes laxatives for bowel movements  He denies abdominal pain, chest pain, diarrhea and urinary symptoms  Please see above list of diagnoses and related plan for additional information  Please note that home health was offered to patient, specifically for wound care but refused  Wound instructions are being and included in patient's discharge summary for home self care  Condition at Discharge: stable    Discharge Day Visit / Exam:   Vitals: Blood Pressure: 122/68 (01/12/22 1006)  Pulse: 93 (01/12/22 1006)  Temperature: 97 9 °F (36 6 °C) (01/12/22 0734)  Temp Source: Temporal (01/12/22 0734)  Respirations: 20 (01/12/22 0734)  Height: 5' 4" (162 6 cm) (01/10/22 1040)  Weight - Scale: 82 7 kg (182 lb 5 1 oz) (01/12/22 0600)  SpO2: 93 % (01/12/22 1100)    Discussion with Family: Updated  (wife) via phone  Discharge instructions/Information to patient and family:   See after visit summary for information provided to patient and family  Provisions for Follow-Up Care:  See after visit summary for information related to follow-up care and any pertinent home health orders         Disposition: Home    Planned Readmission: No     Discharge Statement:  I spent 40 minutes discharging the patient  This time was spent on the day of discharge  I had direct contact with the patient on the day of discharge  Greater than 50% of the total time was spent examining patient, answering all patient questions, arranging and discussing plan of care with patient as well as directly providing post-discharge instructions  Additional time then spent on discharge activities  Discharge Medications:  See after visit summary for reconciled discharge medications provided to patient and/or family        **Please Note: This note may have been constructed using a voice recognition system**

## 2022-01-12 NOTE — RESPIRATORY THERAPY NOTE
Home Oxygen Qualifying Test       Patient name: Rivas Mckeon        : 1950   Date of Test:  2022  Diagnosis: COVID     Home Oxygen Test:    **Medicare Guidelines require item(s) 1-5 on all ambulatory patients or 1 and 2 on non-ambulatory patients  1   Baseline SPO2 on Room Air at rest 87 %  2   SPO2 during exercise on Room Air NA %  During exercise monitor SpO2  If SPO2 increases >=89% with ambulation do not add supplemental             oxygen  If <= 88% on room air add O2 via NC and titrate patient  Patient must be ambulated with O2 and titrated to > 88% with exertion  3   SPO2 on Oxygen at rest 93 % 2 lpm     4   SPO2 during exercise on Oxygen  NA % a liter flow of NA  lpm     5   Exercise performed:          Patient does not ambulate          [x]  Supplemental Home Oxygen is indicated  []  Client does not qualify for home oxygen        Respiratory Additional Notes-     Sendy Jones, RRT

## 2022-01-12 NOTE — ASSESSMENT & PLAN NOTE
Wt Readings from Last 3 Encounters:   01/12/22 82 7 kg (182 lb 5 1 oz)     ECHO with LVEF 40%, moderate global hypokinesis with regional variations, moderate MR  Troponin spill was in the setting of acute illness and hypoxia, and may represent demand ischemia  Cannot rule out ischemic etiology for patient's underlying cardiomyopathy  · Check daily weights - currently stable at 82 kg  · Change to low-sodium diet  · Continue low-dose BB, ASA, and statin therapy  Cardiology also added clopidogrel  · Consider addition of low-dose ACE-inhibitor when patient has stabilized  · Current hypoxia secondary to COVID-19 infection - lower extremity edema in the setting of hypoalbuminemia, reportedly chronic and unchanged  Hold off on diuretic therapy  · Will benefit from an ischemic evaluation once acute illness has resolved    Plan on outpatient nuclear stress test

## 2022-01-12 NOTE — NURSING NOTE
Pt  Was noted to be having 02 saturations between 80-88% on 2LNC  Pt  Was repositioned on to his R side and gave a good cough and is now having 02 saturations in the 90's again

## 2022-01-12 NOTE — CASE MANAGEMENT
Case Management Discharge Planning Note    Patient name Artem Shaffer  Location [address] [id number] MRN [id number]  : 1950 Date 2022       Current Admission Date: 2022  Current Admission Diagnosis:Pneumonia due to COVID-19 virus   Patient Active Problem List    Diagnosis Date Noted    Systolic congestive heart failure ([facility] ) 2022    Neurogenic bladder 2022    Other constipation 2022    Transaminitis 2022    Elevated CPK 2022    Paraplegia ([facility] ) 2022    Pneumonia due to COVID-19 virus 2022    Skin ulcer of toe of left foot with fat layer exposed ([facility] ) 2022    Skin ulcer of toe of right foot with fat layer exposed ([facility] [facility] ) 2022    PAD (peripheral artery disease) ([facility] ) 2022    Acute respiratory failure with hypoxia ([facility] [facility] ) 2022    Elevated troponin 2022      LOS (days): 6  Geometric Mean LOS (GMLOS) (days): 5 40  Days to GMLOS:-0 5     OBJECTIVE:  Risk of Unplanned Readmission Score: 14         Current admission status: Inpatient   Preferred Pharmacy:   [facility], [address] [address]  Phone: [phone] Fax: [phone]    Primary Care Provider: No primary care provider on file      Primary Insurance: MEDICARE  Secondary Insurance:     DISCHARGE DETAILS:    Discharge planning discussed with[de-identified] pateint via phone        CM contacted family/caregiver?: Yes (spoke with wife and she will wait for oxygen delivery)  Were Treatment Team discharge recommendations reviewed with patient/caregiver?: Yes  Did patient/caregiver verbalize understanding of patient care needs?: Yes  Were patient/caregiver advised of the risks associated with not following Treatment Team discharge recommendations?: Yes    Contacts  Contact Method: Phone  Reason/Outcome: Discharge Planning (spoke with pt via phone due to +covid)    [address]         [facility] No (recommended but pt declined)    DME Referral Provided  Referral made for DME?: Yes  DME referral completed for the following items[de-identified] Home Oxygen concentrator,Portable Oxygen tanks  DME Supplier Name[de-identified] [employer]         Would you like to participate in our [address] service program?  : No - Declined    Treatment Team Recommendation: Home with [address] (pt refusing [facility])  Discharge Destination Plan[de-identified] Home     IMM Given (Date):: 01/12/22       Discussed with patient preferences on discharge;understanding how to manage health at home; purpose of taking medications; importance of follow up care/appointments; and symptoms to watch out for once discharged home  Pt refusing referral to [facility] states" I have been helping myself for years I do not want"  Pt to follow up with pulm, and cardiology  In basket messages sent  Pt does not have PCP and states he will set up himself  Transport set up BLS with [email or web address] with a 1943

## 2022-01-12 NOTE — TRANSPORTATION MEDICAL NECESSITY
Section I - General Information    Name of Patient: Tyler Mcdonald                 : 1950    Medicare #: [id number]  Transport Date: 22 (PCS is valid for round trips on this date and for all repetitive trips in the 60-day range as noted below )  Origin: [place]                                                         Destination:[address] [place] [address], [facility]   Is the pt's stay covered under Medicare Part A (PPS/DRG)   [x]     Closest appropriate facility? If no, why is transport to more distant facility required? Home   If hospice pt, is this transport related to pt's terminal illness? NA       Section II - Medical Necessity Questionnaire  Ambulance transportation is medically necessary only if other means of transport are contraindicated or would be potentially harmful to the patient  To meet this requirement, the patient must either be "bed confined" or suffer from a condition such that transport by means other than ambulance is contraindicated by the patient's condition  The following questions must be answered by the medical professional signing below for this form to be valid:    1)  Describe the MEDICAL CONDITION (physical and/or mental) of this patient AT [address] [place] [address] that requires the patient to be transported in an ambulance and why transport by other means is contraindicated by the patient's condition:paraplegic, continuous oxygen, +covid     2) Is the patient "bed confined" as defined below? No  To be "be confined" the patient must satisfy all three of the following conditions: (1) unable to get up from bed without Assistance; AND (2) unable to ambulate; AND (3) unable to sit in a chair or wheelchair  3) Can this patient safely be transported by car or wheelchair van (i e , seated during transport without a medical attendant or monitoring)?    No    4) In addition to completing questions 1-3 above, please check any of the following conditions that apply*:   *Note: supporting documentation for any boxes checked must be maintained in the patient's medical records  If hosp-hosp transfer, describe services needed at 2nd facility not available at 1st facility? Medical attendant required   Requires oxygen-unable to self administer  Special handling/isolation/infection control precautions required       Section III - Signature of Physician or Healthcare Professional  I certify that the above information is true and correct based on my evaluation of this patient, and represent that the patient requires transport by ambulance and that other forms of transport are contraindicated  I understand that this information will be used by the Centers for Medicare and Medicaid Services (CMS) to support the determination of medical necessity for ambulance services, and I represent that I have personal knowledge of the patient's condition at time of transport  []  If this box is checked, I also certify that the patient is physically or mentally incapable of signing the ambulance service's claim and that the institution with which I am affiliated has furnished care, services, or assistance to the patient  My signature below is made on behalf of the patient pursuant to 42 CFR §424 36(b)(4)  In accordance with 42 CFR §424 37, the specific reason(s) that the patient is physically or mentally incapable of signing the claim form is as follows: Gerri Serrato of Physician* or Healthcare Professional______Estrella Fregoso RN ________________________________________________________  Signature Date 01/12/22 (For scheduled repetitive transports, this form is not valid for transports performed more than 60 days after this date)    Printed Name & Credentials of Physician or Healthcare Professional (MD, DO, RN, etc )________________________________  *Form must be signed by patient's attending physician for scheduled, repetitive transports   For non-repetitive, unscheduled ambulance transports, if unable to obtain the signature of the attending physician, any of the following may sign (choose appropriate option below)  [] Physician Assistant []  Clinical Nurse Specialist [x]  Registered Nurse  []  Nurse Practitioner  [x] Discharge Planner

## 2022-01-12 NOTE — ASSESSMENT & PLAN NOTE
Related to paraplegia - patient chronically self caths, currently with Silver catheter in place in the setting of acute illness which was maintained at patient's request due to discomfort with self catheterization in the setting of acute illness  Will discontinue prior to discharge

## 2022-01-12 NOTE — ASSESSMENT & PLAN NOTE
Followed by wound care regularly  Vascular surgery referral was placed recently  Consulted wound care while hospitalized  Skin and Wound Care Plan:   1  Ehob offloading cushion to chair when OOB  2  Elevate heels off the bed with pillows   3  Accumax pump to mattress  4  Hydraguard to bilateral heels, buttocks and sacrum BID and PRN  5  Skin nourishing cream daily  6   Allevyn life silicone bordered dressing to the right lateral-posterior thigh, charley with T, date, peel back daily for skin assessment, reapply dressing, change every 3 days and PRN with soilage or dislodgment     Patient given Hydraguard with instructions for use at home to protect right posterior thigh skin

## 2022-01-12 NOTE — ASSESSMENT & PLAN NOTE
Patient presented with weakness, nausea, fatigue, and body aches - unvaccinated, and acquired  infection from spouse who was vaccinated and doing well at home  Reportedly  COVID positive on 12/26/2021  Initially required 2 L by nasal cannula, up to 10L, improved to 2L this morning  Was maintained on the moderate treatment algorithm -    · CXR 01/05 with COVID-19 infection  · Checked daily labs  · Trend CRP - initially at 33 3 --> 14 9  · Procalcitonin negative X 3 - no antibiotics  · D-dimer 1 25 --> 1 01 - was on Heparin gtt due to elevated troponin, changed to DVT prophylaxis  · Concluded course of IV Remdesivir  · Received 6 days of 6 mg IV dexamethasone  Will finish 4 additional days with oral Decadron  · Was maintained on Baricitinib for 5 days  · Patient is paraplegic with peripheral edema - LE venus doppler showed no DVT  · Significantly improved  Will discharge on 2 L of continuous O2, with follow-up with pulmonology to wean off supplemental oxygen when appropriate

## 2022-01-12 NOTE — ASSESSMENT & PLAN NOTE
Initial minimal elevation of high sensitivity troponin to a value of 85 likely represented non MI elevation in the setting of infection  Patient had been asymptomatic  Developed atypical chest pain on 01/08 - repeat troponin elevated at 1,618  ECG with NSR, downward deflecting Q-waves in 1/aVL, 2/3/AVF, and V5/V6 - most of which look essentially unchanged from time of admission, and without any ST segment abnormalities  · Heparin gtt X 48 hours - discontinued  · Initiated on daily ASA, high potency statin, and low-dose BB - plan on continuation as outpatient  · Clopidogrel added by Cardiology which will also be continued  · At daily PPI for GI prophylaxis  · Trended troponin 85 --> 1,618, 2,182  Trended down to 1,249 with patient chest pain-free  · ECHO with LVEF 40%, wall motion abnormalities  · Given findings of CHF consult Cardiology  · Plan on outpatient nuclear stress test and outpatient follow-up with Cardiology  No answer- unable to lvm.

## 2022-01-12 NOTE — WOUND OSTOMY CARE
Progress Note - Wound   Funmi Factor 73 y o  male MRN: [id number]  Unit/Bed#: 420-01 Encounter: [id number]      History and Present Illness:  Patient admitted with Covid  Seen today for weekly assessment, pt is for dc to home today  Patient history significant for paraplegia as a result of an accident 30 years ago and PAD         Assessment:   Left buttocks skin dry and intact  Scaring visible  +1 pedal edema bilaterally heels intact  allevyn bordered foam removed for assessment and patient refused application  Became aggitated  Given 2 alleyn foam to use to protect area for use at home  Patient is for discharge home today @ 80  Wound 01/07/22 Thigh Posterior;Proximal;Right (Active)   Wound Image   01/07/22 1456   Wound Description GRIFFIN 01/12/22 0104   Anaya-wound Assessment Hyperpigmented;Pink;Scar Tissue 01/07/22 1456   Wound Length (cm) 0 5 cm 01/07/22 1456   Wound Width (cm) 0 7 cm 01/07/22 1456   Wound Depth (cm) 0 1 cm 01/07/22 1456   Wound Surface Area (cm^2) 0 35 cm^2 01/07/22 1456   Wound Volume (cm^3) 0 035 cm^3 01/07/22 1456   Calculated Wound Volume (cm^3) 0 04 cm^3 01/07/22 1456   Drainage Amount None 01/07/22 1456   Non-staged Wound Description Partial thickness 01/07/22 1456   Treatments Cleansed;Irrigation with NSS 01/07/22 1456   Dressing Foam, Silicon (eg  Allevyn, etc) 01/07/22 1456   Dressing Changed New 01/07/22 1456   Patient Tolerance Tolerated well 01/07/22 1456         Skin and Wound Care Plan:   1  Ehob offloading cushion to chair when OOB  2  Elevate heels off the bed with pillows   3  Accumax pump to mattress  4  Hydraguard to bilateral heels, buttocks and sacrum BID and PRN  5  Skin nourishing cream daily  6   Allevyn life silicone bordered dressing to the right lateral-posterior thigh, charley with T, date, peel back daily for skin assessment, reapply dressing, change every 3 days and PRN with soilage or dislodgment     Patient given Hydraguard with instructions for use at home to protect right posterior thigh skin        Wound Care will sign off  Call or Tigertext with any questions    Mira Milesn

## 2022-01-13 NOTE — NURSING NOTE
Pt discharged to home via stretcher , escorted by EMS transport team, PIV and Silver Catheter removed  Discharge instruction was given

## 2022-02-11 ENCOUNTER — TELEMEDICINE (OUTPATIENT)
Dept: PULMONOLOGY | Facility: CLINIC | Age: 72
End: 2022-02-11
Payer: MEDICARE

## 2022-02-11 DIAGNOSIS — U07.1 COVID: ICD-10-CM

## 2022-02-11 DIAGNOSIS — R06.01 ORTHOPNEA: Primary | ICD-10-CM

## 2022-02-11 DIAGNOSIS — U07.1 PNEUMONIA DUE TO COVID-19 VIRUS: ICD-10-CM

## 2022-02-11 DIAGNOSIS — J12.82 PNEUMONIA DUE TO COVID-19 VIRUS: ICD-10-CM

## 2022-02-11 PROCEDURE — 99442 PR PHYS/QHP TELEPHONE EVALUATION 11-20 MIN: CPT | Performed by: INTERNAL MEDICINE

## 2022-02-11 NOTE — ASSESSMENT & PLAN NOTE
He did well for a while post-hospitalization but is now back to needing oxygen again  He describes orthopnea  Check a chest x-ray to differentiate edema/effusions versus the possibility of scarring

## 2022-02-11 NOTE — PROGRESS NOTES
Virtual Brief Visit    Patient is located in the following state in which I hold an active license [place]    Assessment/Plan:    Problem List Items Addressed This Visit        Respiratory    Pneumonia due to COVID-19 virus     He did well for a while post-hospitalization but is now back to needing oxygen again  He describes orthopnea  Check a chest x-ray to differentiate edema/effusions versus the possibility of scarring  Other Visit Diagnoses     Orthopnea    -  Primary    Relevant Orders    XR chest pa & lateral    COVID        Relevant Orders    XR chest pa & lateral        HPI:  Irish Hayes is a 81year old male called for hospital follow up  We intended to perform this visit by Ellen arreola but he did not receive links to join the meeting  He reports he did well for a few weeks after leaving the hospital but is now back to needing oxygen off and on during the day and desaturating at night when he lies recumbent  He was taken off steroids shortly after discharge and did not have symptoms come back until about a month later  He does not report obvious edema but has chronic lymphedema in both his legs  He does not weigh himself  No fevers  Recent Visits  No visits were found meeting these conditions  Showing recent visits within past 7 days and meeting all other requirements  Today's Visits  Date Type Provider Dept   02/11/22 Telemedicine Marichuy Holguin MD [facility]   Showing today's visits and meeting all other requirements  Future Appointments  No visits were found meeting these conditions    Showing future appointments within next 150 days and meeting all other requirements     I spent 18 minutes with patient today in which greater than 50% of the time was spent in counseling/coordination of care regarding chronic hypoxic respiratory failure and COVID-19

## 2022-02-13 ENCOUNTER — HOSPITAL ENCOUNTER (OUTPATIENT)
Dept: RADIOLOGY | Facility: HOSPITAL | Age: 72
Discharge: HOME | DRG: 280 | End: 2022-02-13
Attending: INTERNAL MEDICINE
Payer: MEDICARE

## 2022-02-13 DIAGNOSIS — R06.01 ORTHOPNEA: ICD-10-CM

## 2022-02-13 DIAGNOSIS — U07.1 COVID: ICD-10-CM

## 2022-02-13 PROCEDURE — 71046 X-RAY EXAM CHEST 2 VIEWS: CPT

## 2022-02-14 ENCOUNTER — HOSPITAL ENCOUNTER (INPATIENT)
Facility: HOSPITAL | Age: 72
LOS: 1 days | DRG: 280 | End: 2022-02-15
Attending: EMERGENCY MEDICINE | Admitting: HOSPITALIST
Payer: MEDICARE

## 2022-02-14 ENCOUNTER — TELEPHONE (OUTPATIENT)
Dept: PULMONOLOGY | Facility: CLINIC | Age: 72
End: 2022-02-14

## 2022-02-14 ENCOUNTER — APPOINTMENT (EMERGENCY)
Dept: CT IMAGING | Facility: HOSPITAL | Age: 72
DRG: 280 | End: 2022-02-14
Payer: MEDICARE

## 2022-02-14 DIAGNOSIS — U07.1 PNEUMONIA DUE TO COVID-19 VIRUS: Primary | ICD-10-CM

## 2022-02-14 DIAGNOSIS — J96.21 ACUTE ON CHRONIC RESPIRATORY FAILURE WITH HYPOXIA (HCC): Primary | ICD-10-CM

## 2022-02-14 DIAGNOSIS — J96.01 ACUTE RESPIRATORY FAILURE WITH HYPOXIA (HCC): ICD-10-CM

## 2022-02-14 DIAGNOSIS — J12.82 PNEUMONIA DUE TO COVID-19 VIRUS: Primary | ICD-10-CM

## 2022-02-14 DIAGNOSIS — R77.8 ELEVATED TROPONIN: ICD-10-CM

## 2022-02-14 DIAGNOSIS — I50.9 CHF EXACERBATION (HCC): ICD-10-CM

## 2022-02-14 DIAGNOSIS — I50.21 ACUTE SYSTOLIC CONGESTIVE HEART FAILURE (HCC): ICD-10-CM

## 2022-02-14 DIAGNOSIS — J90 BILATERAL PLEURAL EFFUSION: ICD-10-CM

## 2022-02-14 LAB
ALBUMIN SERPL BCP-MCNC: 3.3 G/DL (ref 3.5–5)
ALP SERPL-CCNC: 87 U/L (ref 46–116)
ALT SERPL W P-5'-P-CCNC: 44 U/L (ref 12–78)
ANION GAP SERPL CALCULATED.3IONS-SCNC: 11 MMOL/L (ref 4–13)
APTT PPP: 39 SECONDS (ref 23–37)
AST SERPL W P-5'-P-CCNC: 50 U/L (ref 5–45)
BASOPHILS # BLD AUTO: 0.02 THOUSANDS/ΜL (ref 0–0.1)
BASOPHILS NFR BLD AUTO: 0 % (ref 0–1)
BILIRUB SERPL-MCNC: 0.65 MG/DL (ref 0.2–1)
BUN SERPL-MCNC: 21 MG/DL (ref 5–25)
CALCIUM ALBUM COR SERPL-MCNC: 9.1 MG/DL (ref 8.3–10.1)
CALCIUM SERPL-MCNC: 8.5 MG/DL (ref 8.3–10.1)
CARDIAC TROPONIN I PNL SERPL HS: 872 NG/L
CHLORIDE SERPL-SCNC: 106 MMOL/L (ref 100–108)
CK MB SERPL-MCNC: 12.6 NG/ML (ref 0–5)
CK MB SERPL-MCNC: 5.1 % (ref 0–2.5)
CK SERPL-CCNC: 245 U/L (ref 39–308)
CO2 SERPL-SCNC: 23 MMOL/L (ref 21–32)
CREAT SERPL-MCNC: 0.85 MG/DL (ref 0.6–1.3)
CRP SERPL QL: 71.8 MG/L
D DIMER PPP FEU-MCNC: 1.33 UG/ML FEU
EOSINOPHIL # BLD AUTO: 0.01 THOUSAND/ΜL (ref 0–0.61)
EOSINOPHIL NFR BLD AUTO: 0 % (ref 0–6)
ERYTHROCYTE [DISTWIDTH] IN BLOOD BY AUTOMATED COUNT: 15.1 % (ref 11.6–15.1)
GFR SERPL CREATININE-BSD FRML MDRD: 88 ML/MIN/1.73SQ M
GLUCOSE SERPL-MCNC: 224 MG/DL (ref 65–140)
HCT VFR BLD AUTO: 37.4 % (ref 36.5–49.3)
HGB BLD-MCNC: 12 G/DL (ref 12–17)
HIV 1+2 AB+HIV1 P24 AG SERPL QL IA: NORMAL
HIV1 P24 AG SER QL: NORMAL
IMM GRANULOCYTES # BLD AUTO: 0.02 THOUSAND/UL (ref 0–0.2)
IMM GRANULOCYTES NFR BLD AUTO: 0 % (ref 0–2)
INR PPP: 1.16 (ref 0.84–1.19)
LACTATE SERPL-SCNC: 2.4 MMOL/L (ref 0.5–2)
LYMPHOCYTES # BLD AUTO: 0.51 THOUSANDS/ΜL (ref 0.6–4.47)
LYMPHOCYTES NFR BLD AUTO: 6 % (ref 14–44)
MCH RBC QN AUTO: 29.4 PG (ref 26.8–34.3)
MCHC RBC AUTO-ENTMCNC: 32.1 G/DL (ref 31.4–37.4)
MCV RBC AUTO: 92 FL (ref 82–98)
MONOCYTES # BLD AUTO: 0.3 THOUSAND/ΜL (ref 0.17–1.22)
MONOCYTES NFR BLD AUTO: 4 % (ref 4–12)
NEUTROPHILS # BLD AUTO: 7.7 THOUSANDS/ΜL (ref 1.85–7.62)
NEUTS SEG NFR BLD AUTO: 90 % (ref 43–75)
NRBC BLD AUTO-RTO: 0 /100 WBCS
NT-PROBNP SERPL-MCNC: 1468 PG/ML
PLATELET # BLD AUTO: 285 THOUSANDS/UL (ref 149–390)
PMV BLD AUTO: 11 FL (ref 8.9–12.7)
POTASSIUM SERPL-SCNC: 4 MMOL/L (ref 3.5–5.3)
PROCALCITONIN SERPL-MCNC: <0.05 NG/ML
PROT SERPL-MCNC: 7.6 G/DL (ref 6.4–8.2)
PROTHROMBIN TIME: 14.2 SECONDS (ref 11.6–14.5)
RBC # BLD AUTO: 4.08 MILLION/UL (ref 3.88–5.62)
SODIUM SERPL-SCNC: 140 MMOL/L (ref 136–145)
WBC # BLD AUTO: 8.56 THOUSAND/UL (ref 4.31–10.16)

## 2022-02-14 PROCEDURE — 82553 CREATINE MB FRACTION: CPT | Performed by: EMERGENCY MEDICINE

## 2022-02-14 PROCEDURE — 83605 ASSAY OF LACTIC ACID: CPT | Performed by: EMERGENCY MEDICINE

## 2022-02-14 PROCEDURE — 84484 ASSAY OF TROPONIN QUANT: CPT | Performed by: EMERGENCY MEDICINE

## 2022-02-14 PROCEDURE — 93005 ELECTROCARDIOGRAM TRACING: CPT

## 2022-02-14 PROCEDURE — 86705 HEP B CORE ANTIBODY IGM: CPT | Performed by: EMERGENCY MEDICINE

## 2022-02-14 PROCEDURE — 86803 HEPATITIS C AB TEST: CPT | Performed by: EMERGENCY MEDICINE

## 2022-02-14 PROCEDURE — 85730 THROMBOPLASTIN TIME PARTIAL: CPT | Performed by: EMERGENCY MEDICINE

## 2022-02-14 PROCEDURE — 96365 THER/PROPH/DIAG IV INF INIT: CPT

## 2022-02-14 PROCEDURE — 82550 ASSAY OF CK (CPK): CPT | Performed by: EMERGENCY MEDICINE

## 2022-02-14 PROCEDURE — 85379 FIBRIN DEGRADATION QUANT: CPT | Performed by: EMERGENCY MEDICINE

## 2022-02-14 PROCEDURE — 86140 C-REACTIVE PROTEIN: CPT | Performed by: EMERGENCY MEDICINE

## 2022-02-14 PROCEDURE — 86900 BLOOD TYPING SEROLOGIC ABO: CPT | Performed by: EMERGENCY MEDICINE

## 2022-02-14 PROCEDURE — 86704 HEP B CORE ANTIBODY TOTAL: CPT | Performed by: EMERGENCY MEDICINE

## 2022-02-14 PROCEDURE — 86901 BLOOD TYPING SEROLOGIC RH(D): CPT | Performed by: EMERGENCY MEDICINE

## 2022-02-14 PROCEDURE — 99285 EMERGENCY DEPT VISIT HI MDM: CPT | Performed by: EMERGENCY MEDICINE

## 2022-02-14 PROCEDURE — 87040 BLOOD CULTURE FOR BACTERIA: CPT | Performed by: EMERGENCY MEDICINE

## 2022-02-14 PROCEDURE — 85610 PROTHROMBIN TIME: CPT | Performed by: EMERGENCY MEDICINE

## 2022-02-14 PROCEDURE — 80053 COMPREHEN METABOLIC PANEL: CPT | Performed by: EMERGENCY MEDICINE

## 2022-02-14 PROCEDURE — 85025 COMPLETE CBC W/AUTO DIFF WBC: CPT | Performed by: EMERGENCY MEDICINE

## 2022-02-14 PROCEDURE — 99285 EMERGENCY DEPT VISIT HI MDM: CPT

## 2022-02-14 PROCEDURE — 87340 HEPATITIS B SURFACE AG IA: CPT | Performed by: EMERGENCY MEDICINE

## 2022-02-14 PROCEDURE — 83880 ASSAY OF NATRIURETIC PEPTIDE: CPT | Performed by: EMERGENCY MEDICINE

## 2022-02-14 PROCEDURE — 87806 HIV AG W/HIV1&2 ANTB W/OPTIC: CPT | Performed by: EMERGENCY MEDICINE

## 2022-02-14 PROCEDURE — 84145 PROCALCITONIN (PCT): CPT | Performed by: EMERGENCY MEDICINE

## 2022-02-14 PROCEDURE — 36415 COLL VENOUS BLD VENIPUNCTURE: CPT | Performed by: EMERGENCY MEDICINE

## 2022-02-14 RX ORDER — CEFTRIAXONE 1 G/50ML
1000 INJECTION, SOLUTION INTRAVENOUS ONCE
Status: COMPLETED | OUTPATIENT
Start: 2022-02-14 | End: 2022-02-14

## 2022-02-14 RX ORDER — PREDNISONE 20 MG/1
TABLET ORAL
Qty: 35 TABLET | Refills: 0 | Status: SHIPPED | OUTPATIENT
Start: 2022-02-14 | End: 2022-02-20 | Stop reason: HOSPADM

## 2022-02-14 RX ADMIN — CEFTRIAXONE 1000 MG: 1 INJECTION, SOLUTION INTRAVENOUS at 22:56

## 2022-02-14 RX ADMIN — SODIUM CHLORIDE 500 ML: 0.9 INJECTION, SOLUTION INTRAVENOUS at 22:56

## 2022-02-14 NOTE — TELEPHONE ENCOUNTER
Reviewed results with patient  Persistent findings of COVID seen  I ordered prednisone and want to have him seen for an urgent follow up  CC'ed MA at [facility]

## 2022-02-14 NOTE — TELEPHONE ENCOUNTER
Pt pao stating he would like a call back to get info from CXR done yesterday that was ordered by Carlos Montes     Please advise

## 2022-02-15 ENCOUNTER — HOSPITAL ENCOUNTER (INPATIENT)
Facility: HOSPITAL | Age: 72
LOS: 5 days | Discharge: HOME | DRG: 246 | End: 2022-02-20
Attending: INTERNAL MEDICINE | Admitting: INTERNAL MEDICINE
Payer: MEDICARE

## 2022-02-15 ENCOUNTER — APPOINTMENT (EMERGENCY)
Dept: CT IMAGING | Facility: HOSPITAL | Age: 72
DRG: 280 | End: 2022-02-15
Payer: MEDICARE

## 2022-02-15 VITALS
BODY MASS INDEX: 31.31 KG/M2 | RESPIRATION RATE: 30 BRPM | TEMPERATURE: 97.2 F | HEART RATE: 117 BPM | SYSTOLIC BLOOD PRESSURE: 117 MMHG | OXYGEN SATURATION: 93 % | HEIGHT: 64 IN | WEIGHT: 183.42 LBS | DIASTOLIC BLOOD PRESSURE: 73 MMHG

## 2022-02-15 DIAGNOSIS — R77.8 ELEVATED TROPONIN: ICD-10-CM

## 2022-02-15 DIAGNOSIS — I25.10 CAD (CORONARY ARTERY DISEASE): Primary | ICD-10-CM

## 2022-02-15 DIAGNOSIS — N31.9 NEUROGENIC BLADDER: ICD-10-CM

## 2022-02-15 DIAGNOSIS — J96.21 ACUTE ON CHRONIC RESPIRATORY FAILURE WITH HYPOXIA (HCC): ICD-10-CM

## 2022-02-15 DIAGNOSIS — D50.9 IRON DEFICIENCY ANEMIA: ICD-10-CM

## 2022-02-15 DIAGNOSIS — I50.21 ACUTE SYSTOLIC CONGESTIVE HEART FAILURE (HCC): ICD-10-CM

## 2022-02-15 DIAGNOSIS — E11.9 TYPE 2 DIABETES MELLITUS (HCC): ICD-10-CM

## 2022-02-15 DIAGNOSIS — J96.01 ACUTE RESPIRATORY FAILURE WITH HYPOXIA (HCC): ICD-10-CM

## 2022-02-15 DIAGNOSIS — I50.20 SYSTOLIC CONGESTIVE HEART FAILURE, UNSPECIFIED HF CHRONICITY (HCC): ICD-10-CM

## 2022-02-15 PROBLEM — I50.23 ACUTE ON CHRONIC SYSTOLIC CONGESTIVE HEART FAILURE (HCC): Status: ACTIVE | Noted: 2022-01-11

## 2022-02-15 PROBLEM — R04.2 HEMOPTYSIS: Status: ACTIVE | Noted: 2022-02-15

## 2022-02-15 LAB
2HR DELTA HS TROPONIN: -78 NG/L
2HR DELTA HS TROPONIN: 1307 NG/L
2HR DELTA HS TROPONIN: 954 NG/L
4HR DELTA HS TROPONIN: 110 NG/L
4HR DELTA HS TROPONIN: 1875 NG/L
ABO GROUP BLD: NORMAL
APTT PPP: 37 SECONDS (ref 23–37)
APTT PPP: 47 SECONDS (ref 23–37)
APTT PPP: 57 SECONDS (ref 23–37)
ATRIAL RATE: 118 BPM
ATRIAL RATE: 120 BPM
BASOPHILS # BLD AUTO: 0.04 THOUSANDS/ΜL (ref 0–0.1)
BASOPHILS NFR BLD AUTO: 0 % (ref 0–1)
CARDIAC TROPONIN I PNL SERPL HS: 1826 NG/L
CARDIAC TROPONIN I PNL SERPL HS: 2747 NG/L
CARDIAC TROPONIN I PNL SERPL HS: 4425 NG/L
CARDIAC TROPONIN I PNL SERPL HS: 4462 NG/L
CARDIAC TROPONIN I PNL SERPL HS: 4540 NG/L
CARDIAC TROPONIN I PNL SERPL HS: 4650 NG/L
CARDIAC TROPONIN I PNL SERPL HS: 5732 NG/L
EOSINOPHIL # BLD AUTO: 0.04 THOUSAND/ΜL (ref 0–0.61)
EOSINOPHIL NFR BLD AUTO: 0 % (ref 0–6)
ERYTHROCYTE [DISTWIDTH] IN BLOOD BY AUTOMATED COUNT: 15 % (ref 11.6–15.1)
HBV CORE AB SER QL: NORMAL
HBV CORE IGM SER QL: NORMAL
HBV SURFACE AG SER QL: NORMAL
HCT VFR BLD AUTO: 34.3 % (ref 36.5–49.3)
HCV AB SER QL: NORMAL
HGB BLD-MCNC: 11 G/DL (ref 12–17)
IMM GRANULOCYTES # BLD AUTO: 0.06 THOUSAND/UL (ref 0–0.2)
IMM GRANULOCYTES NFR BLD AUTO: 1 % (ref 0–2)
INR PPP: 1.12 (ref 0.84–1.19)
LACTATE SERPL-SCNC: 2 MMOL/L (ref 0.5–2)
LYMPHOCYTES # BLD AUTO: 1.55 THOUSANDS/ΜL (ref 0.6–4.47)
LYMPHOCYTES NFR BLD AUTO: 13 % (ref 14–44)
MCH RBC QN AUTO: 29.3 PG (ref 26.8–34.3)
MCHC RBC AUTO-ENTMCNC: 32.1 G/DL (ref 31.4–37.4)
MCV RBC AUTO: 91 FL (ref 82–98)
MONOCYTES # BLD AUTO: 1.18 THOUSAND/ΜL (ref 0.17–1.22)
MONOCYTES NFR BLD AUTO: 10 % (ref 4–12)
NEUTROPHILS # BLD AUTO: 8.88 THOUSANDS/ΜL (ref 1.85–7.62)
NEUTS SEG NFR BLD AUTO: 76 % (ref 43–75)
NRBC BLD AUTO-RTO: 0 /100 WBCS
P AXIS: 35 DEGREES
P AXIS: 40 DEGREES
PLATELET # BLD AUTO: 281 THOUSANDS/UL (ref 149–390)
PLATELET # BLD AUTO: 281 THOUSANDS/UL (ref 149–390)
PMV BLD AUTO: 10.8 FL (ref 8.9–12.7)
PMV BLD AUTO: 10.8 FL (ref 8.9–12.7)
PR INTERVAL: 152 MS
PR INTERVAL: 160 MS
PROCALCITONIN SERPL-MCNC: 0.05 NG/ML
PROTHROMBIN TIME: 13.9 SECONDS (ref 11.6–14.5)
QRS AXIS: 35 DEGREES
QRS AXIS: 56 DEGREES
QRSD INTERVAL: 86 MS
QRSD INTERVAL: 90 MS
QT INTERVAL: 326 MS
QT INTERVAL: 348 MS
QTC INTERVAL: 460 MS
QTC INTERVAL: 487 MS
RBC # BLD AUTO: 3.76 MILLION/UL (ref 3.88–5.62)
RH BLD: NEGATIVE
T WAVE AXIS: 106 DEGREES
T WAVE AXIS: 2 DEGREES
VENTRICULAR RATE: 118 BPM
VENTRICULAR RATE: 120 BPM
WBC # BLD AUTO: 11.75 THOUSAND/UL (ref 4.31–10.16)

## 2022-02-15 PROCEDURE — 71275 CT ANGIOGRAPHY CHEST: CPT

## 2022-02-15 PROCEDURE — 85730 THROMBOPLASTIN TIME PARTIAL: CPT | Performed by: HOSPITALIST

## 2022-02-15 PROCEDURE — 93005 ELECTROCARDIOGRAM TRACING: CPT

## 2022-02-15 PROCEDURE — 94640 AIRWAY INHALATION TREATMENT: CPT

## 2022-02-15 PROCEDURE — 99222 1ST HOSP IP/OBS MODERATE 55: CPT | Performed by: INTERNAL MEDICINE

## 2022-02-15 PROCEDURE — 85049 AUTOMATED PLATELET COUNT: CPT | Performed by: HOSPITALIST

## 2022-02-15 PROCEDURE — 94664 DEMO&/EVAL PT USE INHALER: CPT

## 2022-02-15 PROCEDURE — 84145 PROCALCITONIN (PCT): CPT | Performed by: EMERGENCY MEDICINE

## 2022-02-15 PROCEDURE — 99239 HOSP IP/OBS DSCHRG MGMT >30: CPT | Performed by: INTERNAL MEDICINE

## 2022-02-15 PROCEDURE — 94760 N-INVAS EAR/PLS OXIMETRY 1: CPT

## 2022-02-15 PROCEDURE — 85730 THROMBOPLASTIN TIME PARTIAL: CPT | Performed by: INTERNAL MEDICINE

## 2022-02-15 PROCEDURE — 93010 ELECTROCARDIOGRAM REPORT: CPT | Performed by: INTERNAL MEDICINE

## 2022-02-15 PROCEDURE — 84484 ASSAY OF TROPONIN QUANT: CPT | Performed by: INTERNAL MEDICINE

## 2022-02-15 PROCEDURE — 85730 THROMBOPLASTIN TIME PARTIAL: CPT | Performed by: PHYSICIAN ASSISTANT

## 2022-02-15 PROCEDURE — G1004 CDSM NDSC: HCPCS

## 2022-02-15 PROCEDURE — 85610 PROTHROMBIN TIME: CPT | Performed by: HOSPITALIST

## 2022-02-15 PROCEDURE — 85025 COMPLETE CBC W/AUTO DIFF WBC: CPT | Performed by: HOSPITALIST

## 2022-02-15 PROCEDURE — 99223 1ST HOSP IP/OBS HIGH 75: CPT | Performed by: PHYSICIAN ASSISTANT

## 2022-02-15 PROCEDURE — B2101ZZ FLUOROSCOPY OF SINGLE CORONARY ARTERY USING LOW OSMOLAR CONTRAST: ICD-10-PCS | Performed by: INTERNAL MEDICINE

## 2022-02-15 PROCEDURE — 96375 TX/PRO/DX INJ NEW DRUG ADDON: CPT

## 2022-02-15 PROCEDURE — 84484 ASSAY OF TROPONIN QUANT: CPT | Performed by: EMERGENCY MEDICINE

## 2022-02-15 PROCEDURE — 84484 ASSAY OF TROPONIN QUANT: CPT | Performed by: HOSPITALIST

## 2022-02-15 PROCEDURE — 83605 ASSAY OF LACTIC ACID: CPT | Performed by: EMERGENCY MEDICINE

## 2022-02-15 PROCEDURE — 027036Z DILATION OF CORONARY ARTERY, ONE ARTERY WITH THREE DRUG-ELUTING INTRALUMINAL DEVICES, PERCUTANEOUS APPROACH: ICD-10-PCS | Performed by: INTERNAL MEDICINE

## 2022-02-15 PROCEDURE — 36415 COLL VENOUS BLD VENIPUNCTURE: CPT | Performed by: EMERGENCY MEDICINE

## 2022-02-15 RX ORDER — ALBUTEROL SULFATE 2.5 MG/3ML
2.5 SOLUTION RESPIRATORY (INHALATION) EVERY 4 HOURS PRN
Status: DISCONTINUED | OUTPATIENT
Start: 2022-02-15 | End: 2022-02-15

## 2022-02-15 RX ORDER — ALBUTEROL SULFATE 2.5 MG/3ML
2.5 SOLUTION RESPIRATORY (INHALATION) EVERY 4 HOURS PRN
Status: DISCONTINUED | OUTPATIENT
Start: 2022-02-15 | End: 2022-02-15 | Stop reason: HOSPADM

## 2022-02-15 RX ORDER — SODIUM CHLORIDE FOR INHALATION 0.9 %
3 VIAL, NEBULIZER (ML) INHALATION
Status: CANCELLED | OUTPATIENT
Start: 2022-02-15

## 2022-02-15 RX ORDER — POLYETHYLENE GLYCOL 3350 17 G/17G
17 POWDER, FOR SOLUTION ORAL DAILY PRN
Status: DISCONTINUED | OUTPATIENT
Start: 2022-02-15 | End: 2022-02-20 | Stop reason: HOSPADM

## 2022-02-15 RX ORDER — FUROSEMIDE 10 MG/ML
40 INJECTION INTRAMUSCULAR; INTRAVENOUS 2 TIMES DAILY
Status: DISCONTINUED | OUTPATIENT
Start: 2022-02-15 | End: 2022-02-15

## 2022-02-15 RX ORDER — POLYETHYLENE GLYCOL 3350 17 G/17G
17 POWDER, FOR SOLUTION ORAL DAILY PRN
Status: CANCELLED | OUTPATIENT
Start: 2022-02-15

## 2022-02-15 RX ORDER — HEPARIN SODIUM 1000 [USP'U]/ML
2000 INJECTION, SOLUTION INTRAVENOUS; SUBCUTANEOUS
Status: CANCELLED | OUTPATIENT
Start: 2022-02-15

## 2022-02-15 RX ORDER — ATORVASTATIN CALCIUM 40 MG/1
40 TABLET, FILM COATED ORAL
Status: DISCONTINUED | OUTPATIENT
Start: 2022-02-15 | End: 2022-02-15 | Stop reason: HOSPADM

## 2022-02-15 RX ORDER — ASPIRIN 81 MG/1
81 TABLET ORAL DAILY
Status: CANCELLED | OUTPATIENT
Start: 2022-02-15

## 2022-02-15 RX ORDER — HEPARIN SODIUM 1000 [USP'U]/ML
2000 INJECTION, SOLUTION INTRAVENOUS; SUBCUTANEOUS
Status: DISCONTINUED | OUTPATIENT
Start: 2022-02-15 | End: 2022-02-15 | Stop reason: HOSPADM

## 2022-02-15 RX ORDER — FUROSEMIDE 10 MG/ML
8 SYRINGE (ML) INJECTION CONTINUOUS
Status: DISCONTINUED | OUTPATIENT
Start: 2022-02-15 | End: 2022-02-17

## 2022-02-15 RX ORDER — HEPARIN SODIUM 5000 [USP'U]/ML
5000 INJECTION, SOLUTION INTRAVENOUS; SUBCUTANEOUS EVERY 8 HOURS SCHEDULED
Status: DISCONTINUED | OUTPATIENT
Start: 2022-02-15 | End: 2022-02-15 | Stop reason: SDUPTHER

## 2022-02-15 RX ORDER — FUROSEMIDE 10 MG/ML
40 INJECTION INTRAMUSCULAR; INTRAVENOUS ONCE
Status: COMPLETED | OUTPATIENT
Start: 2022-02-15 | End: 2022-02-15

## 2022-02-15 RX ORDER — HEPARIN SODIUM 10000 [USP'U]/100ML
3-20 INJECTION, SOLUTION INTRAVENOUS
Status: DISCONTINUED | OUTPATIENT
Start: 2022-02-15 | End: 2022-02-15 | Stop reason: HOSPADM

## 2022-02-15 RX ORDER — LEVALBUTEROL 1.25 MG/.5ML
1.25 SOLUTION, CONCENTRATE RESPIRATORY (INHALATION)
Status: CANCELLED | OUTPATIENT
Start: 2022-02-15

## 2022-02-15 RX ORDER — CLOPIDOGREL BISULFATE 75 MG/1
75 TABLET ORAL DAILY
Status: DISCONTINUED | OUTPATIENT
Start: 2022-02-16 | End: 2022-02-20 | Stop reason: HOSPADM

## 2022-02-15 RX ORDER — SENNOSIDES 8.6 MG
3 TABLET ORAL 2 TIMES DAILY PRN
Status: DISCONTINUED | OUTPATIENT
Start: 2022-02-15 | End: 2022-02-19

## 2022-02-15 RX ORDER — HEPARIN SODIUM 1000 [USP'U]/ML
4000 INJECTION, SOLUTION INTRAVENOUS; SUBCUTANEOUS
Status: CANCELLED | OUTPATIENT
Start: 2022-02-15

## 2022-02-15 RX ORDER — HEPARIN SODIUM 10000 [USP'U]/100ML
3-20 INJECTION, SOLUTION INTRAVENOUS
Status: CANCELLED | OUTPATIENT
Start: 2022-02-15

## 2022-02-15 RX ORDER — ALBUMIN, HUMAN INJ 5% 5 %
12.5 SOLUTION INTRAVENOUS ONCE
Status: DISCONTINUED | OUTPATIENT
Start: 2022-02-15 | End: 2022-02-15

## 2022-02-15 RX ORDER — SODIUM CHLORIDE FOR INHALATION 0.9 %
3 VIAL, NEBULIZER (ML) INHALATION
Status: DISCONTINUED | OUTPATIENT
Start: 2022-02-15 | End: 2022-02-15 | Stop reason: HOSPADM

## 2022-02-15 RX ORDER — DOCUSATE SODIUM 100 MG/1
100 CAPSULE, LIQUID FILLED ORAL 2 TIMES DAILY
Status: DISCONTINUED | OUTPATIENT
Start: 2022-02-15 | End: 2022-02-15 | Stop reason: HOSPADM

## 2022-02-15 RX ORDER — METHYLPREDNISOLONE SODIUM SUCCINATE 40 MG/ML
40 INJECTION, POWDER, LYOPHILIZED, FOR SOLUTION INTRAMUSCULAR; INTRAVENOUS EVERY 6 HOURS SCHEDULED
Status: DISCONTINUED | OUTPATIENT
Start: 2022-02-15 | End: 2022-02-15

## 2022-02-15 RX ORDER — CLOPIDOGREL BISULFATE 75 MG/1
75 TABLET ORAL DAILY
Status: DISCONTINUED | OUTPATIENT
Start: 2022-02-15 | End: 2022-02-15 | Stop reason: HOSPADM

## 2022-02-15 RX ORDER — DOCUSATE SODIUM 100 MG/1
100 CAPSULE, LIQUID FILLED ORAL 2 TIMES DAILY
Status: CANCELLED | OUTPATIENT
Start: 2022-02-15

## 2022-02-15 RX ORDER — CLOPIDOGREL BISULFATE 75 MG/1
75 TABLET ORAL DAILY
Status: CANCELLED | OUTPATIENT
Start: 2022-02-15

## 2022-02-15 RX ORDER — ACETAMINOPHEN 325 MG/1
650 TABLET ORAL EVERY 6 HOURS PRN
Status: CANCELLED | OUTPATIENT
Start: 2022-02-15

## 2022-02-15 RX ORDER — ACETAMINOPHEN 325 MG/1
650 TABLET ORAL EVERY 6 HOURS PRN
Status: DISCONTINUED | OUTPATIENT
Start: 2022-02-15 | End: 2022-02-18

## 2022-02-15 RX ORDER — POLYETHYLENE GLYCOL 3350 17 G/17G
17 POWDER, FOR SOLUTION ORAL DAILY PRN
Status: DISCONTINUED | OUTPATIENT
Start: 2022-02-15 | End: 2022-02-15 | Stop reason: HOSPADM

## 2022-02-15 RX ORDER — LISINOPRIL 5 MG/1
5 TABLET ORAL DAILY
Status: DISCONTINUED | OUTPATIENT
Start: 2022-02-15 | End: 2022-02-15

## 2022-02-15 RX ORDER — HEPARIN SODIUM 1000 [USP'U]/ML
4000 INJECTION, SOLUTION INTRAVENOUS; SUBCUTANEOUS ONCE
Status: COMPLETED | OUTPATIENT
Start: 2022-02-15 | End: 2022-02-15

## 2022-02-15 RX ORDER — SENNOSIDES 8.6 MG
3 TABLET ORAL 2 TIMES DAILY PRN
Status: CANCELLED | OUTPATIENT
Start: 2022-02-15

## 2022-02-15 RX ORDER — ASPIRIN 81 MG/1
81 TABLET ORAL DAILY
Status: DISCONTINUED | OUTPATIENT
Start: 2022-02-16 | End: 2022-02-20 | Stop reason: HOSPADM

## 2022-02-15 RX ORDER — LEVALBUTEROL 1.25 MG/.5ML
1.25 SOLUTION, CONCENTRATE RESPIRATORY (INHALATION)
Status: DISCONTINUED | OUTPATIENT
Start: 2022-02-15 | End: 2022-02-15 | Stop reason: HOSPADM

## 2022-02-15 RX ORDER — ALBUTEROL SULFATE 2.5 MG/3ML
2.5 SOLUTION RESPIRATORY (INHALATION) EVERY 4 HOURS PRN
Status: CANCELLED | OUTPATIENT
Start: 2022-02-15

## 2022-02-15 RX ORDER — ACETAMINOPHEN 325 MG/1
650 TABLET ORAL EVERY 6 HOURS PRN
Status: DISCONTINUED | OUTPATIENT
Start: 2022-02-15 | End: 2022-02-15 | Stop reason: HOSPADM

## 2022-02-15 RX ORDER — HEPARIN SODIUM 1000 [USP'U]/ML
4000 INJECTION, SOLUTION INTRAVENOUS; SUBCUTANEOUS
Status: DISCONTINUED | OUTPATIENT
Start: 2022-02-15 | End: 2022-02-15 | Stop reason: HOSPADM

## 2022-02-15 RX ORDER — SODIUM CHLORIDE FOR INHALATION 0.9 %
3 VIAL, NEBULIZER (ML) INHALATION
Status: DISCONTINUED | OUTPATIENT
Start: 2022-02-15 | End: 2022-02-15

## 2022-02-15 RX ORDER — FUROSEMIDE 10 MG/ML
8 SYRINGE (ML) INJECTION CONTINUOUS
Status: CANCELLED | OUTPATIENT
Start: 2022-02-15

## 2022-02-15 RX ORDER — DOCUSATE SODIUM 100 MG/1
100 CAPSULE, LIQUID FILLED ORAL 2 TIMES DAILY
Status: DISCONTINUED | OUTPATIENT
Start: 2022-02-15 | End: 2022-02-20 | Stop reason: HOSPADM

## 2022-02-15 RX ORDER — ASPIRIN 81 MG/1
81 TABLET ORAL DAILY
Status: DISCONTINUED | OUTPATIENT
Start: 2022-02-15 | End: 2022-02-15 | Stop reason: HOSPADM

## 2022-02-15 RX ORDER — HEPARIN SODIUM 1000 [USP'U]/ML
4000 INJECTION, SOLUTION INTRAVENOUS; SUBCUTANEOUS
Status: DISCONTINUED | OUTPATIENT
Start: 2022-02-15 | End: 2022-02-18

## 2022-02-15 RX ORDER — SENNOSIDES 8.6 MG
3 TABLET ORAL 2 TIMES DAILY PRN
Status: DISCONTINUED | OUTPATIENT
Start: 2022-02-15 | End: 2022-02-15 | Stop reason: HOSPADM

## 2022-02-15 RX ORDER — HEPARIN SODIUM 1000 [USP'U]/ML
2000 INJECTION, SOLUTION INTRAVENOUS; SUBCUTANEOUS
Status: DISCONTINUED | OUTPATIENT
Start: 2022-02-15 | End: 2022-02-18

## 2022-02-15 RX ORDER — ATORVASTATIN CALCIUM 40 MG/1
40 TABLET, FILM COATED ORAL
Status: CANCELLED | OUTPATIENT
Start: 2022-02-15

## 2022-02-15 RX ORDER — NITROGLYCERIN 0.4 MG/1
0.4 TABLET SUBLINGUAL
Status: DISCONTINUED | OUTPATIENT
Start: 2022-02-15 | End: 2022-02-15

## 2022-02-15 RX ORDER — ATORVASTATIN CALCIUM 40 MG/1
40 TABLET, FILM COATED ORAL
Status: DISCONTINUED | OUTPATIENT
Start: 2022-02-16 | End: 2022-02-20 | Stop reason: HOSPADM

## 2022-02-15 RX ORDER — LEVALBUTEROL 1.25 MG/.5ML
1.25 SOLUTION, CONCENTRATE RESPIRATORY (INHALATION)
Status: DISCONTINUED | OUTPATIENT
Start: 2022-02-15 | End: 2022-02-15

## 2022-02-15 RX ORDER — FUROSEMIDE 10 MG/ML
8 SYRINGE (ML) INJECTION CONTINUOUS
Status: DISCONTINUED | OUTPATIENT
Start: 2022-02-15 | End: 2022-02-15 | Stop reason: HOSPADM

## 2022-02-15 RX ADMIN — Medication 12.5 MG: at 11:14

## 2022-02-15 RX ADMIN — IOHEXOL 85 ML: 350 INJECTION, SOLUTION INTRAVENOUS at 00:24

## 2022-02-15 RX ADMIN — Medication 12.5 MG: at 21:04

## 2022-02-15 RX ADMIN — HEPARIN SODIUM 2000 UNITS: 1000 INJECTION INTRAVENOUS; SUBCUTANEOUS at 13:23

## 2022-02-15 RX ADMIN — ASPIRIN 81 MG: 81 TABLET, COATED ORAL at 11:13

## 2022-02-15 RX ADMIN — HEPARIN SODIUM 12 UNITS/KG/HR: 10000 INJECTION, SOLUTION INTRAVENOUS at 05:49

## 2022-02-15 RX ADMIN — Medication 12.5 MG: at 15:00

## 2022-02-15 RX ADMIN — Medication 8 MG/HR: at 09:51

## 2022-02-15 RX ADMIN — HEPARIN SODIUM 4000 UNITS: 1000 INJECTION INTRAVENOUS; SUBCUTANEOUS at 05:38

## 2022-02-15 RX ADMIN — LEVALBUTEROL HYDROCHLORIDE 1.25 MG: 1.25 SOLUTION, CONCENTRATE RESPIRATORY (INHALATION) at 10:12

## 2022-02-15 RX ADMIN — IPRATROPIUM BROMIDE 0.5 MG: 0.5 SOLUTION RESPIRATORY (INHALATION) at 10:12

## 2022-02-15 RX ADMIN — LEVALBUTEROL HYDROCHLORIDE 1.25 MG: 1.25 SOLUTION, CONCENTRATE RESPIRATORY (INHALATION) at 14:43

## 2022-02-15 RX ADMIN — FUROSEMIDE 40 MG: 10 INJECTION, SOLUTION INTRAMUSCULAR; INTRAVENOUS at 01:29

## 2022-02-15 RX ADMIN — ATORVASTATIN CALCIUM 40 MG: 40 TABLET, FILM COATED ORAL at 15:00

## 2022-02-15 RX ADMIN — ISODIUM CHLORIDE 3 ML: 0.03 SOLUTION RESPIRATORY (INHALATION) at 14:43

## 2022-02-15 NOTE — H&P
H&P Exam - Alva Armando 81 y o  male MRN: [id number]    Unit/Bed#: 423-01 Encounter: [id number]      Systolic congestive heart failure ([address] )  Assessment & Plan  Wt Readings from Last 3 Encounters:   02/15/22 83 2 kg (183 lb 6 8 oz)   01/12/22 82 7 kg (182 lb 5 1 oz)       1  Lasix 40mg IV BID  2  Daily weight  3  Strict I+O  4  Start lisinopril (EF 40%)  5  Restart metoprolol (wasn't taking his meds at home)  6  Consider spironolactone  7  Cardiology consult      Elevated troponin  Assessment & Plan  Rising troponin with EKG changes in setting of CHF, hypoxia  Discussed with cardiologist on call  No significant hemoptysis  Starting heparin drip, nitro paste  Recommended transfer to [facility] for consideration for cardiac cath  * Acute on chronic respiratory failure with hypoxia (HCC)  Assessment & Plan  Suspect multifactorial etiology with CHF (elevated BNP and peripheral edema), inflammatory lung disease versus hemorrhagic lung disease (elevated crp, hemoptysis)    1  CHF: lasix 40mg IV BID (lasix naive), daily weight, I+O (palacio in place--chronic neurogenic bladder and normally self caths but too weak and tired), cardiology consult  2  Inflammatory lung disease: steroids (solu-medrol 40mg IV q 6 hours), pulmonology consult, IS/PEP  3  Possible hemorrhagic pulmonary process: Repeat H+H in a m , pulmonology consult, cautiously order plavix, aspirin, heparin  History of Present Illness   Mr Obdulia Rand is a 81year old male with a PMHx significant for paraplegia and COVID who presents to the hospital with increased shortness of breath  He says it started at 3-4 a m  yesterday  He woke up with a sudden headache which happens when his oxygen levels get low  His pulse ox was 74%  He was so SOB he couldn't even yell to his wife  He increased his oxygen to 5L and it took quite a while but his sats increased from 88-89%  He had chest pain with it that lasted a few hours   The pain was squeezing, like a vice, around his diaphragmatic area  He rated it 10/10  He said sitting upright/slightly leaning forward helps it  He denies radiation  He did have sweats and a little nausea  He used meditative techniques to slow his breathing  He has had hemoptysis for a month  He says it slowed for a while and then a week ago it changed from bright red "slimey crap" to maroon colored  Then 3-4 days ago he started spitting up quarter sized bright red gobs  He says this has been happening 6 times per day  Before that, the hemoptysis episodes were happening 2-3 times per day  He has had a worsening cough that started yesterday too  He has had a mostly dry cough and he sounds "gurgly"  He denies fevers/chills  He has a small wound on his bottom which he put a 3M patch with silvadene on it every other day  It is due to change today  Review of Systems   Positive for weight gain 1 5 years ago when his pant size went from 34-->38, he developed lymphedema o this left foot then and now his right foot is starting to "blow up"  He has trouble sleeping  He had pins and needles sensation in his legs chronically  He has subacute sinus pressure  He says it feels like he has had a sore throat for a year  He has ringing in his left ear  He has a hoarse voice  He had blurry vision with COVID but that is now improving, not all the way back to normal yet  He has had palpitations when his oxygen drops  He has had shortness of breath and swollen legs  He has had cough with hempptysis and a little phlegm  He has had increase in cough recently  He started "gurgling" a couple of days ago  He has a little bit of LLQ pain  He has constipation and takes 6 senna every 3-4 days  He has occasional heartburn  He has lower back pain bilaterally  He self caths every 3-4 hours because if he goes longer he gets leakage  He noted that his urine was darker today  He says he didn't drink enough yesterday   He says he has itchy skin and after he scratches it he gets a rash  He has had the rash here and there bilaterally  He has one wound that is on his bottom  He says that prior to covid he used to be cold all the time  Since COVID he is warm  He has headaches when his oxygen drops  He gets dizzy sometimes, described as woozy/wobbly not spinning  He bleeds easily on his legs but not his arms  He has depression and is very discouraged with this acute change in his health and says he doesn't care if he dies but he denies suicidal ideation  Historical Information   Past Medical History:   Diagnosis Date    [facility]F [facility]congestive [facility]) ([facility] [address] )     COVID     Paraplegia (HCC)     x30 years     Past Surgical History:   Procedure Laterality Date    BACK SURGERY       Social History   Social History     Substance and Sexual Activity   Alcohol Use Never    Alcohol/week: 0 0 standard drinks     Social History     Substance and Sexual Activity   Drug Use Never     Social History     Tobacco Use   Smoking Status Never Smoker   Smokeless Tobacco Never Used     E-Cigarette Use: Never User     E-Cigarette/Vaping Substances       Family History:   Family History   Problem Relation Age of Onset    Cancer Mother     Stroke Mother     Cancer Father     Alcohol abuse Father        Meds/Allergies   PTA meds:   Prior to Admission Medications   Prescriptions Last Dose Informant Patient Reported?  Taking?   aspirin (ECOTRIN LOW STRENGTH) 81 mg EC tablet Past Week at Unknown time Self No Yes   Sig: Take 1 tablet (81 mg total) by mouth daily   clopidogrel (PLAVIX) 75 mg tablet Not Taking at Unknown time Self No No   Sig: Take 1 tablet (75 mg total) by mouth daily   Patient not taking: Reported on 2/15/2022    cyanocobalamin (VITAMIN B-12) 100 MCG tablet 2/15/2022 at Unknown time Self Yes Yes   Sig: Take 100 mcg by mouth daily   metoprolol tartrate (LOPRESSOR) 25 mg tablet Not Taking at Unknown time Self No No   Sig: Take 1 tablet (25 mg total) by mouth every 12 (twelve) hours   Patient not taking: Reported on 2/15/2022    nitroglycerin (NITROSTAT) 0 4 mg SL tablet Not Taking at Unknown time Self No No   Sig: Place 1 tablet (0 4 mg total) under the tongue every 5 (five) minutes as needed for chest pain   Patient not taking: Reported on 2/15/2022    predniSONE 20 mg tablet 2/14/2022 at Unknown time  No Yes   Sig: Take 2 tablets (40 mg total) by mouth daily for 7 days, THEN 1 5 tablets (30 mg total) daily for 7 days, THEN 1 tablet (20 mg total) daily for 7 days, THEN 0 5 tablets (10 mg total) daily for 7 days  Facility-Administered Medications: None     No Known Allergies    Objective   First Vitals:   Blood Pressure: 133/85 (02/14/22 2209)  Pulse: (!) 137 (02/14/22 2209)  Temperature: 99 5 °F (37 5 °C) (02/14/22 2217)  Temp Source: Tympanic (02/14/22 2217)  Respirations: (!) 28 (02/14/22 2209)  Height: 5' 4" (162 6 cm) (02/14/22 2209)  Weight - Scale: 74 8 kg (165 lb) (02/14/22 2209)  SpO2: 97 % (02/14/22 2209)    Current Vitals:   Blood Pressure: 113/85 (02/15/22 0229)  Pulse: (!) 118 (02/15/22 0502)  Temperature: 97 5 °F (36 4 °C) (02/15/22 0229)  Temp Source: Temporal (02/15/22 0229)  Respirations: 20 (02/15/22 0502)  Height: 5' 4" (162 6 cm) (02/15/22 0134)  Weight - Scale: 83 2 kg (183 lb 6 8 oz) (02/15/22 0134)  SpO2: 93 % (02/15/22 0502)      Intake/Output Summary (Last 24 hours) at 2/15/2022 0533  Last data filed at 2/15/2022 0140  Gross per 24 hour   Intake 550 ml   Output 100 ml   Net 450 ml       Invasive Devices  Report    Peripheral Intravenous Line            Peripheral IV 02/14/22 Right Forearm <1 day          Drain            Urethral Catheter Non-latex;Straight-tip 16 Fr  <1 day                Physical Exam    Lab Results:     Results for Chad Hamming (MRN [id number]) as of 2/15/2022 04:24   Ref   Range 2/14/2022 22:42 2/14/2022 22:43 2/14/2022 22:44 2/14/2022 22:50 2/14/2022 22:51 2/15/2022 00:56 2/15/2022 01:33 2/15/2022 02:45   Sodium Latest Ref Range: 136 - 145 mmol/L  140 Potassium Latest Ref Range: 3 5 - 5 3 mmol/L  4 0         Chloride Latest Ref Range: 100 - 108 mmol/L  106         CO2 Latest Ref Range: 21 - 32 mmol/L  23         Anion Gap Latest Ref Range: 4 - 13 mmol/L  11         BUN Latest Ref Range: 5 - 25 mg/dL  21         Creatinine Latest Ref Range: 0 60 - 1 30 mg/dL  0 85         Glucose, Random Latest Ref Range: 65 - 140 mg/dL  224 (H)         Calcium Latest Ref Range: 8 3 - 10 1 mg/dL  8 5         CORRECTED CALCIUM Latest Ref Range: 8 3 - 10 1 mg/dL  9 1         AST Latest Ref Range: 5 - 45 U/L  50 (H)         ALT Latest Ref Range: 12 - 78 U/L  44         Alkaline Phosphatase Latest Ref Range: 46 - 116 U/L  87         Total Protein Latest Ref Range: 6 4 - 8 2 g/dL  7 6         Albumin Latest Ref Range: 3 5 - 5 0 g/dL  3 3 (L)         TOTAL BILIRUBIN Latest Ref Range: 0 20 - 1 00 mg/dL  0 65         eGFR Latest Units: ml/min/1 73sq m  88         CREATINE KINASE-MB FRACTION Latest Ref Range: 0 0 - 5 0 ng/mL  12 6 (H)         CREATINE KINASE-MB INDEX Latest Ref Range: 0 0 - 2 5 %  5 1 (H)         Total CK Latest Ref Range: 39 - 308 U/L  245         hs TnI 0hr Latest Ref Range: "Refer to ACS Flowchart"- see link ng/L 872 (H)          hs TnI 2hr Latest Ref Range: "Refer to ACS Flowchart"- see link ng/L      1,826 (H)     Delta 2hr hsTnI Latest Ref Range: <20 ng/L      954 (H)     hs TnI 4hr Latest Ref Range: "Refer to ACS Flowchart"- see link ng/L        2,747 (H)   Delta 4hr hsTnI Latest Ref Range: <20 ng/L        1,875 (H)   NT-proBNP Latest Ref Range: <125 pg/mL  1,468 (H)         LACTIC ACID Latest Ref Range: 0 5 - 2 0 mmol/L 2 4 (HH)      2 0    WBC Latest Ref Range: 4 31 - 10 16 Thousand/uL 8 56          Red Blood Cell Count Latest Ref Range: 3 88 - 5 62 Million/uL 4 08          Hemoglobin Latest Ref Range: 12 0 - 17 0 g/dL 12 0          HCT Latest Ref Range: 36 5 - 49 3 % 37 4          MCV Latest Ref Range: 82 - 98 fL 92          MCH Latest Ref Range: 26 8 - 34 3 pg 29 4          MCHC Latest Ref Range: 31 4 - 37 4 g/dL 32 1          RDW Latest Ref Range: 11 6 - 15 1 % 15 1          Platelet Count Latest Ref Range: 149 - 390 Thousands/uL 285          MPV Latest Ref Range: 8 9 - 12 7 fL 11 0          nRBC Latest Units: /100 WBCs 0          Neutrophils % Latest Ref Range: 43 - 75 % 90 (H)          Immat GRANS % Latest Ref Range: 0 - 2 % 0          Lymphocytes Relative Latest Ref Range: 14 - 44 % 6 (L)          Monocytes Relative Latest Ref Range: 4 - 12 % 4          Eosinophils Latest Ref Range: 0 - 6 % 0          Basophils Relative Latest Ref Range: 0 - 1 % 0          Immature Grans Absolute Latest Ref Range: 0 00 - 0 20 Thousand/uL 0 02          Absolute Neutrophils Latest Ref Range: 1 85 - 7 62 Thousands/µL 7 70 (H)          Lymphocytes Absolute Latest Ref Range: 0 60 - 4 47 Thousands/µL 0 51 (L)          Absolute Monocytes Latest Ref Range: 0 17 - 1 22 Thousand/µL 0 30          Absolute Eosinophils Latest Ref Range: 0 00 - 0 61 Thousand/µL 0 01          Basophils Absolute Latest Ref Range: 0 00 - 0 10 Thousands/µL 0 02          Protime Latest Ref Range: 11 6 - 14 5 seconds   14 2        POCT INR Latest Ref Range: 0 84 - 1 19    1 16        PTT Latest Ref Range: 23 - 37 seconds   39 (H)        ABO Grouping Unknown     A      Rh Factor Unknown     Negative      RAPID HIV 1 AND 2 Latest Ref Range: Non-Reactive      Non-Reactive      HIV-1 P24 Ag Latest Ref Range: Non-Reactive      Non-Reactive      BLOOD CULTURE Unknown   Rpt ((NONE)) Rpt ((NONE))       C-REACTIVE PROTEIN Latest Ref Range: <3 0 mg/L  71 8 (H)         D-Dimer, Quant Latest Ref Range: <0 50 ug/ml FEU   1 33 (H)        Procalcitonin Latest Ref Range: <=0 25 ng/ml  <0 05             Imaging: CXR 2/13/22:  IMPRESSION:     Compared to 1/9/2022, there is significant improvement in diffuse air post opacities, with residual airspace opacity still present in the right mid lung zone        CTA chest: IMPRESSION:     No evidence of pulmonary embolus      Bilateral groundglass airspace disease in a mostly perihilar distribution likely secondary to CHF although component of residual COVID pneumonia not excluded      Moderate bilateral pleural effusions    EKG, Pathology, and Other Studies: sinus rhythm with acute ST changes concerning on ED tracing but nonspecific on repeat eval, reviewed with cardiologist     Code Status: Level 3 - DNAR and DNI  Advance Directive and Living Will:      Power of :    POLST:      Counseling / Coordination of Care: Total floor / unit time spent today 85 minutes

## 2022-02-15 NOTE — CONSULTS
Consultation - Cardiology   Gina Gale 81 y o  male MRN: [id number]  Unit/Bed#:  Encounter: [id number]    Inpatient consult to Cardiology  Consult performed by: Ari Kimball PA-C  Consult ordered by: Morelia Toney PA-C            Physician Requesting Consult: Dandre Srivastava MD  Reason for Consult / Principal Problem: acute systolic heart failure, acute respiratory failure with hypoxia    Assessment/Plan:  1  Acute on chronic respiratory failure with hypoxia   - secondary to #2 with bilateral pleural effusions   - will diuresis as discussed below   - also with recent COVID infection, pulmonary is also consulted    2  Acute systolic and diastolic congestive heart failure   - with evidence of bilateral moderate pleural effusions on CTA and elevated BNP   - had excellent response to 40 mg IV lasix but developed hypotension   - subsequently was placed on lasix gtt by the primary team at 8 mg/hr, will need to assess response and hemodynamics closely with this   - continue low sodium diet and fluid restriction   - he will need daily weights - cannot do standing weight secondary to paraplegia   - strict I/O as able   - monitor daily BMP    3  Type II myocardial infarction   - elevated troponin most likely represents a Type II myocardial infarction in the setting of demand ischemia secondary to acute respiratory failure and acute congestive heart failure   - patient has new EKG changes as well as intermittent chest discomfort   - given risk factors for CAD, elevated troponin, new cardiomyopathy with wall motion abnormalities, and coronary artery calcifications on CT would recommend ischemic evaluation with a cardiac catheterization once his volume status improves   - on aspirin, plavix, statin, and beta-blocker therapy   - transfer is currently pending    4   Cardiomyopathy    - EF noted to be 40% with moderate global hypokinesis with regional variations   - as above, ischemic evaluation will eventually be pursued once volume status improves   - on very low dose lopressor now secondary to hypotension - would switch to succinate given cardiomyopathy   - hypotension currently precludes addition of afterload reducing agents    History of Present Illness   HPI: Quiana Penny is a 81y o  year old male with a cardiomyopathy - EF 40% - etiology unclear at this time, paraplegia, peripheral vascular disease, prior tobacco abuse who denies any prior cardiac history  The patient presented to the ER yesterday for the evaluation of shortness of breath and chest tightness  Patient was discharged on 1/12/22 from this facility  He was hospitalized with COVID-19 and during this time was found to have an ejection fraction of 40%  A nuclear stress test was recommended as an outpatient  He was discharged home on 2L NC and reports initially was feeling well  He was slowly weaning himself off of supplemental oxygen  About one week ago he started having worsening of his shortness of breath again  He began to experience orthopnea  He started wearing supplemental oxygen again at 5L NC  He does monitor his oxygen saturation closely at home  He noticed if his oxygen saturation would drop into the 80's, he would experience chest discomfort described as "tightening/squeezing " When his oxygen levels improved this discomfort would go away  He has chronic lymphedema but states he noticed worsening of this  In addition he has been coughing, sometimes he has been having hemoptysis  He reported the above symptoms to his pulmonologist on 2/11 during a telemedicine visit  A CXR was ordered which showed persistent findings related to COVID-19  Prednisone was ordered  Patient took 2 doses of prednisone yesterday but came to the ER later in the evening due to worsening of symptoms  Upon admission he had a CTA which revealed moderate bilateral pleural effusions  BNP was elevated at 1468, increased compared to prior   He was given lasix 40 mg IV x 1 dose with excellent urinary output although became hypotensive  High sensitivity troponin levels were 872, 1826, 2747, 4425, 5732  EKG showed ST/T wave changes in the inferior and lateral leads as well as widening of the QRS complex  He was started on heparin gtt and was recommended by cardiology overnight to be transferred for ischemic evaluation  Cardiology was consulted here for further evaluation and management  Review of Systems   Constitutional: Negative for chills and fever  Cardiovascular: Positive for chest pain, leg swelling and orthopnea  Negative for palpitations  Respiratory: Positive for cough, hemoptysis, shortness of breath and sputum production  Gastrointestinal: Negative for diarrhea, nausea and vomiting  All other systems reviewed and are negative  Historical Information   Past Medical History:   Diagnosis Date    CHF (congestive heart failure) ([address] )     COVID     Paraplegia (HCC)     x30 years     Past Surgical History:   Procedure Laterality Date    BACK SURGERY       Social History     Substance and Sexual Activity   Alcohol Use Never    Alcohol/week: 0 0 standard drinks     Social History     Substance and Sexual Activity   Drug Use Never     Social History     Tobacco Use   Smoking Status Never Smoker   Smokeless Tobacco Never Used     Family History: non-contributory    Meds/Allergies   all current active meds have been reviewed  furosemide, 8 mg/hr, Last Rate: 8 mg/hr (02/15/22 0951)  heparin (porcine), 3-20 Units/kg/hr (Order-Specific), Last Rate: 12 Units/kg/hr (02/15/22 0733)        No Known Allergies    Objective   Vitals: Blood pressure 104/70, pulse (!) 117, temperature 97 6 °F (36 4 °C), temperature source Temporal, resp  rate (!) 39, height 5' 4" (1 626 m), weight 83 2 kg (183 lb 6 8 oz), SpO2 95 %  , Body mass index is 31 48 kg/m² ,   Orthostatic Blood Pressures      Most Recent Value   Blood Pressure 104/70 filed at 02/15/2022 1000   Patient Position - Orthostatic VS Lying filed at 02/15/2022 3890        Systolic (83ZET), OTB:490 , Min:86 , EQT:088     Diastolic (54WIE), TRK:13, Min:43, Max:85      Intake/Output Summary (Last 24 hours) at 2/15/2022 1026  Last data filed at 2/15/2022 0951  Gross per 24 hour   Intake 550 ml   Output 1950 ml   Net -1400 ml       Weight (last 2 days)     Date/Time Weight    02/15/22 0600 83 2 (183 42)     02/15/22 0416 83 2 (183 42)    02/15/22 0134 83 2 (183 42)    02/14/22 2209 74 8 (165)    Comments:   Weight: unable to stand at 02/15/22 0600         Invasive Devices  Report    Peripheral Intravenous Line            Peripheral IV 02/14/22 Right Forearm <1 day    Peripheral IV 02/15/22 Dorsal (posterior); Left Hand <1 day          Drain            Urethral Catheter Non-latex;Straight-tip 16 Fr  <1 day                  Physical Exam  Vitals reviewed  Constitutional:       General: He is not in acute distress  Appearance: He is well-developed  He is not diaphoretic  HENT:      Head: Normocephalic and atraumatic  Eyes:      Pupils: Pupils are equal, round, and reactive to light  Neck:      Vascular: No carotid bruit  Cardiovascular:      Rate and Rhythm: Regular rhythm  Tachycardia present  Pulses:           Radial pulses are 2+ on the right side and 2+ on the left side  Heart sounds: S1 normal and S2 normal  No murmur heard  Pulmonary:      Effort: Pulmonary effort is normal  No respiratory distress  Breath sounds: Examination of the right-lower field reveals rales  Rales present  No wheezing  Abdominal:      General: There is no distension  Palpations: Abdomen is soft  Tenderness: There is no abdominal tenderness  Musculoskeletal:      Cervical back: Normal range of motion  Right lower leg: Edema (+2 pretibial edema, worse on L, with +3 pedal edema bilaterally) present  Left lower leg: Edema present  Skin:     General: Skin is warm and dry  Findings: No erythema  Neurological:      General: No focal deficit present  Mental Status: He is alert and oriented to person, place, and time  Psychiatric:         Mood and Affect: Mood normal          Behavior: Behavior normal              Laboratory Results:  Results from last 7 days   Lab Units 02/14/22  2243   CK TOTAL U/L 245   CK MB INDEX % 5 1*       CBC with diff:   Results from last 7 days   Lab Units 02/15/22  0530 02/14/22  2242   WBC Thousand/uL 11 75* 8 56   HEMOGLOBIN g/dL 11 0* 12 0   HEMATOCRIT % 34 3* 37 4   MCV fL 91 92   PLATELETS Thousands/uL 281  281 285   MCH pg 29 3 29 4   MCHC g/dL 32 1 32 1   RDW % 15 0 15 1   MPV fL 10 8  10 8 11 0   NRBC AUTO /100 WBCs 0 0         CMP:  Results from last 7 days   Lab Units 02/14/22  2243   POTASSIUM mmol/L 4 0   CHLORIDE mmol/L 106   CO2 mmol/L 23   BUN mg/dL 21   CREATININE mg/dL 0 85   CALCIUM mg/dL 8 5   AST U/L 50*   ALT U/L 44   ALK PHOS U/L 87   EGFR ml/min/1 73sq m 88         BMP:  Results from last 7 days   Lab Units 02/14/22  2243   POTASSIUM mmol/L 4 0   CHLORIDE mmol/L 106   CO2 mmol/L 23   BUN mg/dL 21   CREATININE mg/dL 0 85   CALCIUM mg/dL 8 5       BNP:  No results for input(s): BNP in the last 72 hours  Magnesium:       Coags:   Results from last 7 days   Lab Units 02/15/22  0534 02/14/22  2244   PTT seconds 37 39*   INR  1 12 1 16       TSH:       Hemoglobin A1C       Lipid Profile:         Cardiac testing:   No results found for this or any previous visit  No results found for this or any previous visit  No results found for this or any previous visit  No results found for this or any previous visit  Imaging: I have personally reviewed pertinent reports  XR chest pa & lateral    Result Date: 2/13/2022  Narrative: CHEST INDICATION:   U07 1: COVID-19 R06 01: Orthopnea  History of COVID-19 pneumonia with persistent symptoms   COMPARISON:  Chest x-ray from 1/9/2020 EXAM PERFORMED/VIEWS:  XR CHEST PA & LATERAL FINDINGS: Cardiomediastinal silhouette appears unremarkable  Compared to 1/9/2022, there is significant improvement in diffuse air post opacities, with residual airspace opacity still present in the right mid lung zone  No pleural effusion or pneumothorax  Osseous structures appear within normal limits for patient age  Impression: Compared to 1/9/2022, there is significant improvement in diffuse air post opacities, with residual airspace opacity still present in the right mid lung zone  Workstation performed: [id number]     CTA ED chest PE study    Result Date: 2/15/2022  Narrative: CTA - CHEST WITH IV CONTRAST - PULMONARY ANGIOGRAM INDICATION:   sob, hyoxia, hemoptysis, covid hospitalization approx 1 month ago  COMPARISON: None  TECHNIQUE: CTA examination of the chest was performed using angiographic technique according to a protocol specifically tailored to evaluate for pulmonary embolism  Axial, sagittal, and coronal 2D reformatted images were created from the source data and  submitted for interpretation  In addition, coronal 3D MIP postprocessing was performed on the acquisition scanner  Radiation dose length product (DLP) for this visit:  567 71 mGy-cm   This examination, like all CT scans performed in the [facility], was performed utilizing techniques to minimize radiation dose exposure, including the use of iterative  reconstruction and automated exposure control  IV Contrast:  85 mL of iohexol (OMNIPAQUE)  FINDINGS: PULMONARY ARTERIAL TREE:  No pulmonary embolus is seen  LUNGS:  Symmetric bilateral groundglass airspace disease in a mostly perihilar distribution  Mild interlobar septal thickening Emphysematous changes  There is no tracheal or endobronchial lesion  PLEURA:  Moderate size bilateral pleural effusions  HEART/GREAT VESSELS:  Coronary artery calcification is noted  Heart is otherwise unremarkable  No thoracic aortic aneurysm  No thoracic aortic aneurysm   MEDIASTINUM AND VIRI: Mild mediastinal and bilateral hilar adenopathy  CHEST WALL AND LOWER NECK:   Unremarkable  VISUALIZED STRUCTURES IN THE UPPER ABDOMEN:  Unremarkable  OSSEOUS STRUCTURES:  No acute fracture or destructive osseous lesion  Thoracolumbar metallic rods partially visualized  Impression: No evidence of pulmonary embolus  Bilateral groundglass airspace disease in a mostly perihilar distribution likely secondary to CHF although component of residual COVID pneumonia not excluded   Moderate bilateral pleural effusions Workstation performed: [id number]       EKG reviewed personally: sinus tachycardia with PVC's, nonspecific ST/T wave abnormalities, widened QRS  Telemetry reviewed personally:     Assessment:  Principal Problem:    Acute on chronic respiratory failure with hypoxia (HCC)  Active Problems:    Elevated troponin    Other constipation    Systolic congestive heart failure (HCC)          Code Status: Level 3 - DNAR and DNI

## 2022-02-15 NOTE — CASE MANAGEMENT
Case Management Progress Note    Patient name Sandy Alvarado  Location / MRN [id number]  : 1950 Date 2/15/2022       LOS (days): 0  Geometric Mean LOS (GMLOS) (days):   Days to GMLOS:        OBJECTIVE:        Current admission status: Inpatient  Preferred Pharmacy:   [address],[facility] [address] [facility] [address]  Phone: [phone] Fax: [phone]    Primary Care Provider: No primary care provider on file  Primary Insurance: MEDICARE  Secondary Insurance:     PROGRESS NOTE:  As per physician patient is being transferred to [facility] to a higher level of care  As per physician patient needs a cardiac cath

## 2022-02-15 NOTE — ED PROVIDER NOTES
History  Chief Complaint   Patient presents with    Shortness of Breath     SOB began 5/6p, took prednisone approx 4p  This is a 79-year-old male with a history of CHF, PAD, paraplegia, neurogenic bladder (self catheterizes at home) who presents with shortness of breath  Patient was hospitalized approximately 1 month ago for acute respiratory failure secondary to COVID pneumonia  Patient was ultimately discharged on 2 L nasal cannula  Patient states that he has been experiencing hemoptysis ever since his admission 1 month ago  Starting last night, patient started to experience acute worsening of his shortness of breath and chest tightness  Patient had to increase his oxygen to 5 L at home  He called his pulmonologist who told him to come to the emergency department if his symptoms with continued to worsen  Patient states that his shortness of breath continued, so he called EMS for evaluation  Patient was placed on 10 L non-rebreather by EMS  On arrival, the patient is tachypneic and tachycardic  We were able to place him on 4 L nasal cannula with O2 saturations in the low 90s  Patient is speaking in full sentences, however, does appear dyspneic  Denies fever/chills, nausea/vomiting, lightheadedness/dizziness, numbness/weakness, headache, change in vision, URI symptoms, neck pain, chest pain, palpitations, back pain, flank pain, abdominal pain, diarrhea, hematochezia, melena, dysuria, hematuria  Of note, the patient was sent for a chest x-ray by his pulmonologist after a telemedicine visit yesterday  The chest x-ray revealed significant improvement in diffuse air space opacities, with residual airspace opacity still present in the right mid lung zone  Prior to Admission Medications   Prescriptions Last Dose Informant Patient Reported?  Taking?   aspirin (ECOTRIN LOW STRENGTH) 81 mg EC tablet  Self No No   Sig: Take 1 tablet (81 mg total) by mouth daily   clopidogrel (PLAVIX) 75 mg tablet Self No No   Sig: Take 1 tablet (75 mg total) by mouth daily   cyanocobalamin (VITAMIN B-12) 100 MCG tablet  Self Yes No   Sig: Take 100 mcg by mouth daily   metoprolol tartrate (LOPRESSOR) 25 mg tablet  Self No No   Sig: Take 1 tablet (25 mg total) by mouth every 12 (twelve) hours   nitroglycerin (NITROSTAT) 0 4 mg SL tablet  Self No No   Sig: Place 1 tablet (0 4 mg total) under the tongue every 5 (five) minutes as needed for chest pain   predniSONE 20 mg tablet   No No   Sig: Take 2 tablets (40 mg total) by mouth daily for 7 days, THEN 1 5 tablets (30 mg total) daily for 7 days, THEN 1 tablet (20 mg total) daily for 7 days, THEN 0 5 tablets (10 mg total) daily for 7 days  Facility-Administered Medications: None       Past Medical History:   Diagnosis Date    Paraplegia ([facility] [address] )     x30 years       Past Surgical History:   Procedure Laterality Date    BACK SURGERY         History reviewed  No pertinent family history  I have reviewed and agree with the history as documented  E-Cigarette/Vaping    E-Cigarette Use Never User      E-Cigarette/Vaping Substances     Social History     Tobacco Use    Smoking status: Never Smoker    Smokeless tobacco: Never Used   Vaping Use    Vaping Use: Never used   Substance Use Topics    Alcohol use: Never     Alcohol/week: 0 0 standard drinks    Drug use: Never       Review of Systems   Constitutional: Negative for chills, fatigue and fever  HENT: Negative for rhinorrhea, sore throat and trouble swallowing  Eyes: Negative for photophobia and visual disturbance  Respiratory: Positive for cough, chest tightness and shortness of breath  Hemoptysis   Cardiovascular: Negative for chest pain, palpitations and leg swelling  Gastrointestinal: Negative for abdominal pain, blood in stool, diarrhea, nausea and vomiting  Endocrine: Negative for polyuria  Genitourinary: Negative for dysuria, flank pain and hematuria     Musculoskeletal: Negative for back pain and neck pain  Skin: Negative for color change and rash  Allergic/Immunologic: Negative for immunocompromised state  Neurological: Negative for dizziness, weakness, light-headedness, numbness and headaches  All other systems reviewed and are negative  Physical Exam  Physical Exam  Constitutional:       General: He is not in acute distress  Appearance: Normal appearance  He is well-developed  HENT:      Mouth/Throat:      Pharynx: Uvula midline  Eyes:      General: Lids are normal       Conjunctiva/sclera: Conjunctivae normal       Pupils: Pupils are equal, round, and reactive to light  Neck:      Thyroid: No thyroid mass or thyromegaly  Trachea: Trachea normal    Cardiovascular:      Rate and Rhythm: Regular rhythm  Tachycardia present  Heart sounds: Normal heart sounds  No murmur heard  Pulmonary:      Effort: Pulmonary effort is normal  Tachypnea present  Breath sounds: Normal breath sounds  Abdominal:      General: Bowel sounds are normal       Palpations: Abdomen is soft  Tenderness: There is no abdominal tenderness  There is no guarding or rebound  Negative signs include Dunlap's sign  Skin:     General: Skin is warm and dry  Neurological:      Mental Status: He is alert  Psychiatric:         Speech: Speech normal          Behavior: Behavior normal  Behavior is cooperative  Thought Content:  Thought content normal          Vital Signs  ED Triage Vitals   Temperature Pulse Respirations Blood Pressure SpO2   02/14/22 2217 02/14/22 2209 02/14/22 2209 02/14/22 2209 02/14/22 2209   99 5 °F (37 5 °C) (!) 137 (!) 28 133/85 97 %      Temp Source Heart Rate Source Patient Position - Orthostatic VS BP Location FiO2 (%)   02/14/22 2217 02/14/22 2209 02/14/22 2209 02/14/22 2209 --   Tympanic Monitor Sitting Left arm       Pain Score       02/14/22 2209       5           Vitals:    02/14/22 2209 02/15/22 0036   BP: 133/85 108/70   Pulse: (!) 137 (!) 122 Patient Position - Orthostatic VS: Sitting Lying         Visual Acuity      ED Medications  Medications   furosemide (LASIX) injection 40 mg (has no administration in time range)   cefTRIAXone (ROCEPHIN) IVPB (premix in dextrose) 1,000 mg 50 mL (0 mg Intravenous Stopped 2/14/22 2326)   sodium chloride 0 9 % bolus 500 mL (500 mL Intravenous New Bag 2/14/22 2256)   iohexol (OMNIPAQUE) 350 MG/ML injection (SINGLE-DOSE) 85 mL (85 mL Intravenous Given 2/15/22 0024)       Diagnostic Studies  Results Reviewed     Procedure Component Value Units Date/Time    HS Troponin I 2hr [[id number]] Collected: 02/15/22 0056    Lab Status: In process Specimen: Blood from Arm, Right Updated: 02/15/22 0105    HS Troponin I 4hr [[id number]]     Lab Status: No result Specimen: Blood     CKMB [[id number]]  (Abnormal) Collected: 02/14/22 2243    Lab Status: Final result Specimen: Blood from Arm, Right Updated: 02/14/22 2349     CK-MB Index 5 1 %      CK-MB 12 6 ng/mL     Rapid HIV 1/2 AB-AG Combo [[id number]]  (Normal) Collected: 02/14/22 2251    Lab Status: Final result Specimen: Blood from Arm, Right Updated: 02/14/22 2348     Rapid HIV 1 AND 2 Non-Reactive     HIV-1 P24 Ag Screen Non-Reactive    Narrative:      Negative for HIV-1 p24 Antigen  Negative for HIV-1 and/or HIV-2 Antibody      C-reactive protein [[id number]]  (Abnormal) Collected: 02/14/22 2243    Lab Status: Final result Specimen: Blood from Arm, Right Updated: 02/14/22 2345     CRP 71 8 mg/L     NT-BNP PRO [[id number]]  (Abnormal) Collected: 02/14/22 2243    Lab Status: Final result Specimen: Blood from Arm, Right Updated: 02/14/22 2345     NT-proBNP 1,468 pg/mL     CK (with reflex to MB) [[id number]]  (Normal) Collected: 02/14/22 2243    Lab Status: Final result Specimen: Blood from Arm, Right Updated: 02/14/22 2345     Total  U/L     Procalcitonin with AM Reflex [[id number]]  (Normal) Collected: 02/14/22 7399    Lab Status: Final result Specimen: Blood from Arm, Right Updated: 02/14/22 2337     Procalcitonin <0 05 ng/ml     Procalcitonin Reflex [[id number]]     Lab Status: No result Specimen: Blood     Lactic acid, plasma [[id number]]  (Abnormal) Collected: 02/14/22 2242    Lab Status: Final result Specimen: Blood from Arm, Right Updated: 02/14/22 2334     LACTIC ACID 2 4 mmol/L     Narrative:      Result may be elevated if tourniquet was used during collection      Lactic acid 2 Hours [[id number]]     Lab Status: No result Specimen: Blood     HS Troponin 0hr (reflex protocol) [[id number]]  (Abnormal) Collected: 02/14/22 2242    Lab Status: Final result Specimen: Blood from Arm, Right Updated: 02/14/22 2334     hs TnI 0hr 872 ng/L     D-dimer, quantitative [[id number]]  (Abnormal) Collected: 02/14/22 2244    Lab Status: Final result Specimen: Blood from Arm, Right Updated: 02/14/22 2328     D-Dimer, Quant 1 33 ug/ml FEU     Protime-INR [[id number]]  (Normal) Collected: 02/14/22 2244    Lab Status: Final result Specimen: Blood from Arm, Right Updated: 02/14/22 2328     Protime 14 2 seconds      INR 1 16    APTT [[id number]]  (Abnormal) Collected: 02/14/22 2244    Lab Status: Final result Specimen: Blood from Arm, Right Updated: 02/14/22 2328     PTT 39 seconds     Comprehensive metabolic panel [[id number]]  (Abnormal) Collected: 02/14/22 2243    Lab Status: Final result Specimen: Blood from Arm, Right Updated: 02/14/22 2325     Sodium 140 mmol/L      Potassium 4 0 mmol/L      Chloride 106 mmol/L      CO2 23 mmol/L      ANION GAP 11 mmol/L      BUN 21 mg/dL      Creatinine 0 85 mg/dL      Glucose 224 mg/dL      Calcium 8 5 mg/dL      Corrected Calcium 9 1 mg/dL      AST 50 U/L      ALT 44 U/L      Alkaline Phosphatase 87 U/L      Total Protein 7 6 g/dL      Albumin 3 3 g/dL      Total Bilirubin 0 65 mg/dL      eGFR 88 ml/min/1 73sq m     Narrative:      [place] guidelines for Chronic Kidney Disease (CKD):     Stage 1 with normal or high GFR (GFR > 90 mL/min/1 73 square meters)    Stage 2 Mild CKD (GFR = 60-89 mL/min/1 73 square meters)    Stage 3A Moderate CKD (GFR = 45-59 mL/min/1 73 square meters)    Stage 3B Moderate CKD (GFR = 30-44 mL/min/1 73 square meters)    Stage 4 Severe CKD (GFR = 15-29 mL/min/1 73 square meters)    Stage 5 End Stage CKD (GFR <15 mL/min/1 73 square meters)  Note: GFR calculation is accurate only with a steady state creatinine    CBC and differential [[id number]]  (Abnormal) Collected: 02/14/22 2242    Lab Status: Final result Specimen: Blood from Arm, Right Updated: 02/14/22 2316     WBC 8 56 Thousand/uL      RBC 4 08 Million/uL      Hemoglobin 12 0 g/dL      Hematocrit 37 4 %      MCV 92 fL      MCH 29 4 pg      MCHC 32 1 g/dL      RDW 15 1 %      MPV 11 0 fL      Platelets 802 Thousands/uL      nRBC 0 /100 WBCs      Neutrophils Relative 90 %      Immat GRANS % 0 %      Lymphocytes Relative 6 %      Monocytes Relative 4 %      Eosinophils Relative 0 %      Basophils Relative 0 %      Neutrophils Absolute 7 70 Thousands/µL      Immature Grans Absolute 0 02 Thousand/uL      Lymphocytes Absolute 0 51 Thousands/µL      Monocytes Absolute 0 30 Thousand/µL      Eosinophils Absolute 0 01 Thousand/µL      Basophils Absolute 0 02 Thousands/µL     Narrative: This is an appended report  These results have been appended to a previously verified report  Blood culture #1 [[id number]] Collected: 02/14/22 2250    Lab Status: In process Specimen: Blood from Arm, Left Updated: 02/14/22 2309    Blood culture #2 [[id number]] Collected: 02/14/22 2244    Lab Status: In process Specimen: Blood from Arm, Right Updated: 02/14/22 2309    Chronic Hepatitis Panel [[id number]] Collected: 02/14/22 2243    Lab Status: In process Specimen: Blood from Arm, Right Updated: 02/14/22 2308                 CTA ED chest PE study   Final Result by Holden Khan MD (02/15 7287)      No evidence of pulmonary embolus        Bilateral groundglass airspace disease in a mostly perihilar distribution likely secondary to CHF although component of residual COVID pneumonia not excluded  Moderate bilateral pleural effusions            Workstation performed: [id number]                    Procedures  ECG 12 Lead Documentation Only    Date/Time: 2/14/2022 10:35 PM  Performed by: Silas Flynn MD  Authorized by: Silas Flynn MD     ECG reviewed by me, the ED Provider: yes    Patient location:  ED  Previous ECG:     Previous ECG:  Compared to current    Comparison ECG info:  1/9/22    Similarity:  Changes noted    Comparison to cardiac monitor: Yes    Interpretation:     Interpretation: abnormal    Rate:     ECG rate:  135    ECG rate assessment: tachycardic    Rhythm:     Rhythm: sinus tachycardia    Ectopy:     Ectopy: none    QRS:     QRS axis:  Left    QRS intervals:  Normal  Conduction:     Conduction: normal    ST segments:     ST segments:  Non-specific (Diffuse ST depression )  T waves:     T waves: non-specific               ED Course  ED Course as of 02/15/22 0109   Tue Feb 15, 2022   0103 Patient states that he would prefer to have a Silver placed if we are going to give him Lasix  MDM  Number of Diagnoses or Management Options  Diagnosis management comments: Will check labs, EKG  CTA chest to evaluate for PE as the patient is hypercoagulable due to previous COVID pneumonia  Also, possibly sepsis due to underlying bacterial pneumonia  Started on antibiotics  Patient will likely require admission due to increased oxygen demand        Disposition  Final diagnoses:   Acute on chronic respiratory failure with hypoxia (HCC)   CHF exacerbation (HCC)   Bilateral pleural effusion   Elevated troponin     Time reflects when diagnosis was documented in both MDM as applicable and the Disposition within this note     Time User Action Codes Description Comment    2/15/2022  1:00 AM Joseline DELGADO Add [J96 21] Acute on chronic respiratory failure with hypoxia ([facility] )     2/15/2022  1:01 AM Cameron Guadalupe P Add [I50 9] CHF exacerbation ([facility] )     2/15/2022  1:01 AM Cameron Guadalupe P Add [J90] Bilateral pleural effusion     2/15/2022  1:01 AM Cameron Guadalupe P Add [R77 8] Elevated troponin       ED Disposition     ED Disposition Condition Date/Time Comment    Admit Stable Tue Feb 15, 2022  1:00 AM       Follow-up Information    None         Patient's Medications   Discharge Prescriptions    No medications on file       No discharge procedures on file      PDMP Review     None          ED Provider  Electronically Signed by           Kervin Sanchez MD  02/15/22 7158

## 2022-02-15 NOTE — ASSESSMENT & PLAN NOTE
On prior admission (1/5/22), patient had initial hsTrop elevation to 85, which was deemed non-MI troponin elevation in setting of normal ECG  Developed new chest pain later that admission with new hsTrop elevation to >1600  ECG did not show acute ST changes, so he was sent home on ASA, plavix, statin, BB therapy  Patient was initially discharged with plan for outpatient nuclear stress test     Readmitted this time for chest pain and worsening shortness of breath, likely CHF exacerbation  HsTrop on admission 872 - peak 5732 - 4650  Associated with recent new reduction in LVEF  Transferred to [facility] for potential cardiac cath  Plan  - Cardiology consulted, appreciate recs  - Repeat echo revealed EF 20%, a decrease from 40% in January  (severe global hypokinesis, moderate MR, mild TR, mild WA)  - Coronary angiogram revealed severe multivessel coronary artery disease (100% stenosis of ramus, 99% stenosis of D1, 90% stenosis of prox LAD, 90% stenosis of prox Cx, 70% stenosis mid LAD, 100% stenosis of ost RCA to prox RCA)  - Per cardiac surgery, the patient is not a candidate for surgical management due to poor pulmonary status, recommended medical management +/- PCI   -patient had high risk PCI yesterday, no complications, currently not having any chest pain    - Continue ASA, plavix, high intensity statin, low-dose BB  - Continue telemetry  - Monitor vital signs

## 2022-02-15 NOTE — DISCHARGE SUMMARY
[facility] [address] [place]  Discharge- Desmond Shrestha 1950, 81 y o  male MRN: [id number]  Unit/Bed#:  Encounter: [id number]  Primary Care Provider: No primary care provider on file  Date and time admitted to hospital: 2/14/2022 10:07 PM    * Acute on chronic respiratory failure with hypoxia [facility])  Assessment & Plan  Suspect likely on the basis of acute CHF exacerbation  Patient also with complaints of hemoptysis  Evaluation and treatment as below  Of note, patient was hospitalized approximately 1 month ago for COVID-19 pneumonia, for which he responded well to treatment and was discharged on 2 L of supplemental oxygen  He initially tested positive on 01/05/2022, with improved symptoms prior to discharge  Per pulmonology consult, despite elevation in CRP would not recommend COVID directed therapy at this time  Hypoxic respiratory failure is likely in the setting of acute CHF exacerbation  Acute on chronic systolic congestive heart failure (HCC)  Assessment & Plan  Wt Readings from Last 3 Encounters:   02/15/22 83 2 kg (183 lb 6 8 oz)   01/12/22 82 7 kg (182 lb 5 1 oz)     Patient was hospitalized between 1/5-1/12/2022 for COVID pneumonia and hypoxia  He developed chest pain while hospitalized, with mildly elevated troponin - subsequent ECHO 1/10 with LVEF 40%, moderate global hypokinesis with regional variations, moderate MR  Plan had been to weight complete resolution of patient's acute illness with COVID, and pursue outpatient ischemic evaluation with a nuclear stress test     Mr Verner Guicho presented earlier today with worsening hypoxia and dyspnea, elevated proBNP, and CT findings consistent with volume overload and moderate pleural effusions  · Daily weights  · Insure low-sodium, fluid-restricted diet when eating  · Diuresed well with IV furosemide push, but with development of hypotension    · Transitioned to furosemide gtt with improved blood pressure and adequate urine output  · Continue low-dose beta-blocker, ASA, and statin  Of note, spouse reports patient was noncompliant with medications at home  Patient with concurrent elevation in troponin and ECG changes - may represent a type 2 myocardial infarction in the setting of demand ischemia, but given risk factors for CAD, EKG changes, as well as newly diagnosed cardiomyopathy with wall motion abnormalities will likely benefit from a left heart catheterization  Patient was accepted in transfer to [facility] following discussion with Dr Ember Grigsby of Cardiology - accepted by medicine team Dr Placido Schaeffer attending  Elevated troponin  Assessment & Plan  Elevated troponin with EKG changes in setting of acute CHF exacerbation and hypoxia  However, patient also with concurrent ECG changes from baseline  · Troponins trended - peak of 5,732 prior to down trending  · Given hypotension changed metoprolol to lower but Q8 dosing for improved heart rate control  · Continue on dual anti-platelet therapy with ASA and clopidogrel  Continue atorvastatin  · Initiated on furosemide gtt as above  · Being maintained on heparin infusion  · Continue to monitor for any signs of recurrence of hemoptysis  · May benefit from left heart catheterization - plans as above  Hemoptysis  Assessment & Plan  Patient with reported episodes of hemoptysis at home - none over the course of the last 30+ hours  Hemoptysis was deemed to be insignificant and essentially resolved at this time by pulmonology consult, with no indication for bronchoscopy at this time  Continue to monitor H&H, and evaluate patient for any recurrence  Neurogenic bladder  Assessment & Plan  Neurogenic bladder related to patient's underlying paraplegia - chronically self caths  Currently with Silver catheter in place in the setting of need for accurate I/O's      Other constipation  Assessment & Plan  He takes 6 senna tabs every 3-4 days  Will add standing colace as well as MiraLax as needed  PAD (peripheral artery disease) ([facility])  Assessment & Plan  Continue DAPT as well as statin therapy  Paraplegia [facility])  Assessment & Plan  Chronic conditions secondary to prior trauma - lives at home with spouse  Medical Problems             Resolved Problems  Date Reviewed: 2/15/2022    None              Discharging Physician / Practitioner: Francie Flores MD  PCP: No primary care provider on file  Admission Date:   Admission Orders (From admission, onward)     Ordered        02/15/22 0135  Inpatient Admission  Once                      Discharge Date: 02/15/22    Consultations During Hospital Stay:  · Cardiology  · Pulmonology    Procedures Performed:   · None    Significant Findings / Test Results:   CTA ED chest PE study    Result Date: 2/15/2022  Impression: No evidence of pulmonary embolus  Bilateral groundglass airspace disease in a mostly perihilar distribution likely secondary to CHF although component of residual COVID pneumonia not excluded  Moderate bilateral pleural effusions Workstation performed: [id number]     ECHO:    Result Date: 1/10/22      Left Ventricle: Left ventricular cavity size is normal  Wall thickness is increased  The left ventricular ejection fraction is 40%  Systolic function is moderately reduced  There is moderate global hypokinesis with regional variation  There is mild concentric hypertrophy    Left Atrium: The atrium is mildly dilated    Mitral Valve: There is moderate regurgitation    Tricuspid Valve: There is mild regurgitation    The estimated pulmonary artery systolic pressure is 11 9 mmHg  Incidental Findings:   · As above     Test Results Pending at Discharge (will require follow up): · None     Outpatient Tests Requested:  · Not applicable    Complications:  None    Reason for Admission:  Acute on chronic hypoxic respiratory failure, acute CHF exacerbation      HPI from original H&P earlier today 02/15/2022:     Mr Nkechi Singh is a 81year old male with a PMHx significant for paraplegia and COVID who presents to the hospital with increased shortness of breath  He says it started at 3-4 a m  yesterday  He woke up with a sudden headache which happens when his oxygen levels get low  His pulse ox was 74%  He was so SOB he couldn't even yell to his wife  He increased his oxygen to 5L and it took quite a while but his sats increased from 88-89%  He had chest pain with it that lasted a few hours  The pain was squeezing, like a vice, around his diaphragmatic area  He rated it 10/10  He said sitting upright/slightly leaning forward helps it  He denies radiation  He did have sweats and a little nausea  He used meditative techniques to slow his breathing  He has had hemoptysis for a month  He says it slowed for a while and then a week ago it changed from bright red "slimey crap" to maroon colored  Then 3-4 days ago he started spitting up quarter sized bright red gobs  He says this has been happening 6 times per day  Before that, the hemoptysis episodes were happening 2-3 times per day  He has had a worsening cough that started yesterday too  He has had a mostly dry cough and he sounds "gurgly"  He denies fevers/chills  He has a small wound on his bottom which he put a 3M patch with silvadene on it every other day  It is due to change today  Please see above list of diagnoses and related plan for additional information  Condition at Discharge: stable    Discharge Day Visit / Exam:     Vitals: Blood Pressure: 110/59 (02/15/22 1400)  Pulse: (!) 115 (02/15/22 1400)  Temperature: (!) 97 2 °F (36 2 °C) (02/15/22 1114)  Temp Source: Temporal (02/15/22 1114)  Respirations: (!) 38 (02/15/22 1400)  Height: 5' 4" (162 6 cm) (02/15/22 0134)  Weight - Scale: 83 2 kg (183 lb 6 8 oz) (unable to stand) (02/15/22 0600)  SpO2: 95 % (4) (02/15/22 1444)  Exam:   Physical Exam  Vitals reviewed     Constitutional: General: He is not in acute distress  Appearance: He is well-developed  He is not ill-appearing or toxic-appearing  Cardiovascular:      Rate and Rhythm: Regular rhythm  Tachycardia present  Heart sounds: No murmur heard  Pulmonary:      Effort: Pulmonary effort is normal  No respiratory distress  Breath sounds: Rales present  No wheezing or rhonchi  Abdominal:      Palpations: Abdomen is soft  Tenderness: There is no abdominal tenderness  There is no right CVA tenderness, guarding or rebound  Musculoskeletal:         General: Swelling present  No tenderness  Cervical back: Neck supple  Comments: 3+ b/l LE pitting edema   Skin:     General: Skin is warm and dry  Neurological:      Mental Status: He is alert and oriented to person, place, and time  Comments: Chronic paraplegia noted  Psychiatric:         Mood and Affect: Mood normal          Behavior: Behavior normal        Discussion with Family: Updated  (wife) via phone  Discharge instructions/Information to patient and family:   See after visit summary for information provided to patient and family  Provisions for Follow-Up Care:  See after visit summary for information related to follow-up care and any pertinent home health orders  Disposition:   [address] Transfer to [facility] [address]     Planned Readmission: Yes     Discharge Statement:  I spent 90 minutes discharging the patient  This time was spent on the day of discharge  I had direct contact with the patient on the day of discharge  Greater than 50% of the total time was spent examining patient, answering all patient questions, arranging and discussing plan of care with patient as well as directly providing post-discharge instructions  Additional time then spent on discharge activities  Discharge Medications:  See after visit summary for reconciled discharge medications provided to patient and/or family  **Please Note: This note may have been constructed using a voice recognition system**

## 2022-02-15 NOTE — ASSESSMENT & PLAN NOTE
Elevated troponin with EKG changes in setting of acute CHF exacerbation and hypoxia  However, patient also with concurrent ECG changes from baseline  · Troponins trended - peak of 5,732 prior to down trending  · Given hypotension changed metoprolol to lower but Q8 dosing for improved heart rate control  · Continue on dual anti-platelet therapy with ASA and clopidogrel  Continue atorvastatin  · Initiated on furosemide gtt as above  · Being maintained on heparin infusion  · Continue to monitor for any signs of recurrence of hemoptysis  · May benefit from left heart catheterization - plans as above

## 2022-02-15 NOTE — ASSESSMENT & PLAN NOTE
Wt Readings from Last 3 Encounters:   02/15/22 83 2 kg (183 lb 6 8 oz)   01/12/22 82 7 kg (182 lb 5 1 oz)     Patient was hospitalized between 1/5-1/12/2022 for COVID pneumonia and hypoxia  He developed chest pain while hospitalized, with mildly elevated troponin - subsequent ECHO 1/10 with LVEF 40%, moderate global hypokinesis with regional variations, moderate MR  Plan had been to weight complete resolution of patient's acute illness with COVID, and pursue outpatient ischemic evaluation with a nuclear stress test     Mr Elina Colmenares presented earlier today with worsening hypoxia and dyspnea, elevated proBNP, and CT findings consistent with volume overload and moderate pleural effusions  · Daily weights  · Insure low-sodium, fluid-restricted diet when eating  · Diuresed well with IV furosemide push, but with development of hypotension  · Transitioned to furosemide gtt with improved blood pressure and adequate urine output  · Continue low-dose beta-blocker, ASA, and statin  · Of note, spouse reports patient was noncompliant with medications at home  Patient with concurrent elevation in troponin and ECG changes - may represent a type 2 myocardial infarction in the setting of demand ischemia, but given risk factors for CAD, EKG changes, as well as newly diagnosed cardiomyopathy with wall motion abnormalities will likely benefit from a left heart catheterization  Patient was accepted in transfer to [facility] following discussion with Dr Víctor Chase of Cardiology - accepted by medicine team Dr Lyn Robles attending

## 2022-02-15 NOTE — CONSULTS
Consultation - Pulmonary Medicine   Adeline Barkley 81 y o  male MRN: [id number]  Unit/Bed#:  Encounter: [id number]      Assessment/Plan:    1  Acute on chronic hypoxic respiratory failure  1  Currently requiring 6L NC  Baseline O2 is 2L NC   2  Titrate O2 to maintain saturations greater than or equal to 88%   3  Pulmonary toilet: increase activity as tolerated, cough and deep breathing exercises encouraged, IS q1hr  2  Acute systolic and diastolic CHF   1  POA with bilateral moderate pleural effusions, pulmonary edema, elevated BNP  2  Continue diuresis per cardilogy/primary team- currently on lasix gtt at 8 mg/hr  3  Monitor I/Os and daily weights   3  Bilateral pleural effusions  1  Likely in the setting of CHF exacerbation   2  Continue diuresis per cardiology/primary team   3  If euvolemic achieved or unable to tolerate aggressive diuresis but still with persistent pleural effusions causing respiratory compromise could consider therapeutic and diagnostic thoracentesis but no role for that intervention at this time   4  History of COVID 19 pneumonia   1  Tested positive 1/5/22, unvaccinated   2  Reported improvement   3  Would not recommend COVID directed therapies at this time   4  Continue supplemental O2   5  Hemoptysis   1  Resolved- patient reports that his last episode of hemoptysis 30 hours ago  2  Continue to monitor   3  Not significant/massive  4  No indication for bronchoscopy at this time   6  Type II myocardial infarction   1  Likely in the setting of demand ischemia due to worsenign hypoxia and CHF exacerbation   2  Troponin trending up, EKG with nonspecific findings, and patient with new chest discomfort   3  Primary team and cardiology recommending transfer to [facility] for left/right cardiac cath once optimize from a volume status   4  Currently on heparin gtt- defer management to cardiology/priamry team   7  Emphysema without acute exacerbation   1   No need for systemic steroids at this time 2  Recommend Xopenex/NS nebs TID   3  Discontinue Atrovent   8  Paraplegia   1  Noted       History of Present Illness   Physician Requesting Consult: Oleg Jose MD  Reason for Consult / Principal Problem: acute hypoxic respiratory failure   Hx and PE limited by: n/a  Chief Complaint: shortness of breath   HPI: Trang Hoffman is a 81 y o   male who presented to [facility] with complaints of shortness of breath  He has a past medical history positive for her paraplegia, neurogenic bladder, CHF, recent COVID infection last month requiring hospitalization  Was recently discharged from the hospital 01/12/2022 after he was admitted for acute hypoxic respiratory failure, hemoptysis secondary to COVID-19 pneumonia  While there he completed a course of remdesivir, 60s of IV Decadron with plans to finish out regimen as outpatient and was sent home on 2 L nasal cannula continuously  Patient had follow-up with Pulmonary 02/11/2022 where he reported initial improvement in his respiratory symptoms then approximately a week ago began to develop worsening shortness of breath, chest pain, orthopnea, chronic lymphedema in bilateral lower extremities  He also reported recurrence of hemoptysis approximately about a week ago  He describes hemoptysis is bright red glucose approximately the size stethoscope diaphragm  His last episode of hemoptysis but 30 hours ago  Outpatient chest x-ray revealed persistent right middle opacities prompting Dr Leigh Owens to start him on prednisone  He took one 40 mg dose and felt no improvement  He presented to the ED late yesterday for further evaluation given his significant hypoxia with reported SpO2 at home in the 60s/70s  In the ED he was noted be tachycardic, tachypneic and hypoxic requiring up to 10 L nasal cannula  He had elevated CRP, D-dimer, elevated troponins and proBNP, lactic acidosis now resolved    CTA chest PE study showed no evidence of PE but did show bilateral ground-glass-mostly perihilar distribution moderate bilateral pleural imaging  Pulmonary was consulted help manage this  Presently, patient states that he feels much better  Chest pain is resolved now that supplemental oxygen is on a and improved saturations are noted  Patient has shortness of breath with minimal activities  No wheezing or chest tightness  No GI symptoms  Patient usually straight caths himself at home but now has a Silver with excellent urine output  Inpatient consult to Pulmonology  Consult performed by: Ranulfo Ruiz PA-C  Consult ordered by: Ranulfo Ruiz PA-C          Review of Systems   All other systems reviewed and are negative  Historical Information   Past Medical History:   Diagnosis Date    CHF (congestive heart failure) ([address] )     COVID     Paraplegia (HCC)     x30 years     Past Surgical History:   Procedure Laterality Date    BACK SURGERY       Social History   Social History     Substance and Sexual Activity   Alcohol Use Never    Alcohol/week: 0 0 standard drinks     Social History     Substance and Sexual Activity   Drug Use Never     Social History     Tobacco Use   Smoking Status Never Smoker   Smokeless Tobacco Never Used     E-Cigarette/Vaping    E-Cigarette Use Never User      E-Cigarette/Vaping Substances     Occupational History: disabled      Family History:   Family History   Problem Relation Age of Onset   Flody Marvin Cancer Mother     Stroke Mother     Cancer Father     Alcohol abuse Father        Meds/Allergies   all current active meds have been reviewed, pertinent pulmonary meds have been reviewed and current meds:   Current Facility-Administered Medications   Medication Dose Route Frequency    acetaminophen (TYLENOL) tablet 650 mg  650 mg Oral Q6H PRN    albuterol inhalation solution 2 5 mg  2 5 mg Nebulization Q4H PRN    aspirin (ECOTRIN LOW STRENGTH) EC tablet 81 mg  81 mg Oral Daily    clopidogrel (PLAVIX) tablet 75 mg 75 mg Oral Daily    docusate sodium (COLACE) capsule 100 mg  100 mg Oral BID    furosemide (LASIX) 500 mg infusion 50 mL  8 mg/hr Intravenous Continuous    heparin (porcine) 25,000 units in 0 45% NaCl 250 mL infusion (premix)  3-20 Units/kg/hr (Order-Specific) Intravenous Titrated    heparin (porcine) injection 2,000 Units  2,000 Units Intravenous Q1H PRN    heparin (porcine) injection 4,000 Units  4,000 Units Intravenous Q1H PRN    levalbuterol (XOPENEX) inhalation solution 1 25 mg  1 25 mg Nebulization TID    metoprolol tartrate (LOPRESSOR) partial tablet 12 5 mg  12 5 mg Oral Q8H [facility] & assisted    senna (SENOKOT) tablet 25 8 mg  3 tablet Oral BID PRN    sodium chloride 0 9 % inhalation solution 3 mL  3 mL Nebulization TID       No Known Allergies    Objective   Vitals: Blood pressure 119/75, pulse (!) 119, temperature (!) 97 2 °F (36 2 °C), temperature source Temporal, resp  rate (!) 37, height 5' 4" (1 626 m), weight 83 2 kg (183 lb 6 8 oz), SpO2 94 %  6L NC,Body mass index is 31 48 kg/m²  Intake/Output Summary (Last 24 hours) at 2/15/2022 1255  Last data filed at 2/15/2022 1200  Gross per 24 hour   Intake 550 ml   Output 2255 ml   Net -1705 ml     Invasive Devices  Report    Peripheral Intravenous Line            Peripheral IV 02/14/22 Right Forearm <1 day    Peripheral IV 02/15/22 Dorsal (posterior); Left Hand <1 day          Drain            Urethral Catheter Non-latex;Straight-tip 16 Fr  <1 day                Physical Exam  Vitals and nursing note reviewed  Constitutional:       General: He is not in acute distress  Appearance: Normal appearance  HENT:      Head: Normocephalic and atraumatic  Right Ear: External ear normal       Left Ear: External ear normal       Nose: Nose normal       Mouth/Throat:      Mouth: Mucous membranes are moist       Pharynx: Oropharynx is clear  Eyes:      Extraocular Movements: Extraocular movements intact        Conjunctiva/sclera: Conjunctivae normal  Pupils: Pupils are equal, round, and reactive to light  Cardiovascular:      Rate and Rhythm: Normal rate and regular rhythm  Pulses: Normal pulses  Heart sounds: No murmur heard  Pulmonary:      Effort: Pulmonary effort is normal  No respiratory distress  Breath sounds: Rales (bilateral lower lung fields) present  No wheezing or rhonchi  Abdominal:      General: Bowel sounds are normal       Palpations: Abdomen is soft  There is no mass  Tenderness: There is no abdominal tenderness  Hernia: No hernia is present  Musculoskeletal:         General: No tenderness or deformity  Normal range of motion  Cervical back: Normal range of motion and neck supple  No muscular tenderness  Right lower leg: No edema  Left lower leg: No edema  Skin:     General: Skin is warm and dry  Neurological:      General: No focal deficit present  Mental Status: He is alert and oriented to person, place, and time  Mental status is at baseline  Psychiatric:         Mood and Affect: Mood normal          Behavior: Behavior normal          Thought Content: Thought content normal          Judgment: Judgment normal          Lab Results:   I have personally reviewed pertinent lab results  , ABG: No results found for: PHART, PCD4PNC, PO2ART, BLC4WTW, J2ZOFJZT, BEART, SOURCE, BNP: No results found for: BNP, CBC:   Lab Results   Component Value Date    WBC 11 75 (H) 02/15/2022    HGB 11 0 (L) 02/15/2022    HCT 34 3 (L) 02/15/2022    MCV 91 02/15/2022     02/15/2022     02/15/2022    MCH 29 3 02/15/2022    MCHC 32 1 02/15/2022    RDW 15 0 02/15/2022    MPV 10 8 02/15/2022    MPV 10 8 02/15/2022    NRBC 0 02/15/2022   , CMP:   Lab Results   Component Value Date    SODIUM 140 02/14/2022    K 4 0 02/14/2022     02/14/2022    CO2 23 02/14/2022    BUN 21 02/14/2022    CREATININE 0 85 02/14/2022    CALCIUM 8 5 02/14/2022    AST 50 (H) 02/14/2022    ALT 44 02/14/2022    ALKPHOS 87 02/14/2022    EGFR 88 02/14/2022   , PT/INR:   Lab Results   Component Value Date    INR 1 12 02/15/2022   , Troponin: No results found for: TROPONINI    Imaging Studies: I have personally reviewed pertinent reports  and I have personally reviewed pertinent films in PACS     CTA chest PE study 02/15/2022  No PE  Bilateral ground-glass airspace opacities mostly perihilar distribution  Coronary artery calcifications  Bilateral moderate pleural effusions  EKG, Pathology, and Other Studies: I have personally reviewed pertinent reports  01/10/2022 echocardiogram  EF 40%  Moderately reduced systolic function  Moderate global hypokinesis with regional variation and mild concentric hypertrophy  Pulmonary Results (PFTs, PSG): I have personally reviewed pertinent reports  VTE Prophylaxis: Sequential compression device (Venodyne)  and Heparin    Code Status: Level 3 - DNAR and DNI    Portions of the record may have been created with voice recognition software  Occasional wrong word or "sound a like" substitutions may have occurred due to the inherent limitations of voice recognition software  Read the chart carefully and recognize, using context, where substitutions have occurred

## 2022-02-15 NOTE — PLAN OF CARE
Problem: MOBILITY - ADULT  Goal: Maintain or return to baseline ADL function  Description: INTERVENTIONS:  -  Assess patient's ability to carry out ADLs; assess patient's baseline for ADL function and identify physical deficits which impact ability to perform ADLs (bathing, care of mouth/teeth, toileting, grooming, dressing, etc )  - Assess/evaluate cause of self-care deficits   - Assess range of motion  - Assess patient's mobility; develop plan if impaired  - Assess patient's need for assistive devices and provide as appropriate  - Encourage maximum independence but intervene and supervise when necessary  - Involve family in performance of ADLs  - Assess for home care needs following discharge   - Consider OT consult to assist with ADL evaluation and planning for discharge  - Provide patient education as appropriate  Outcome: Progressing  Goal: Maintains/Returns to pre admission functional level  Description: INTERVENTIONS:  - Perform BMAT or MOVE assessment daily    - Set and communicate daily mobility goal to care team and patient/family/caregiver  - Collaborate with rehabilitation services on mobility goals if consulted  - Perform Range of Motion 2-3 times a day  - Reposition patient every 2 hours    - Dangle patient 2-3 times a day  - Record patient progress and toleration of activity level   Outcome: Progressing     Problem: PAIN - ADULT  Goal: Verbalizes/displays adequate comfort level or baseline comfort level  Description: Interventions:  - Encourage patient to monitor pain and request assistance  - Assess pain using appropriate pain scale  - Administer analgesics based on type and severity of pain and evaluate response  - Implement non-pharmacological measures as appropriate and evaluate response  - Consider cultural and social influences on pain and pain management  - Notify physician/advanced practitioner if interventions unsuccessful or patient reports new pain  Outcome: Progressing     Problem: SAFETY ADULT  Goal: Maintain or return to baseline ADL function  Description: INTERVENTIONS:  -  Assess patient's ability to carry out ADLs; assess patient's baseline for ADL function and identify physical deficits which impact ability to perform ADLs (bathing, care of mouth/teeth, toileting, grooming, dressing, etc )  - Assess/evaluate cause of self-care deficits   - Assess range of motion  - Assess patient's mobility; develop plan if impaired  - Assess patient's need for assistive devices and provide as appropriate  - Encourage maximum independence but intervene and supervise when necessary  - Involve family in performance of ADLs  - Assess for home care needs following discharge   - Consider OT consult to assist with ADL evaluation and planning for discharge  - Provide patient education as appropriate  Outcome: Progressing  Goal: Maintains/Returns to pre admission functional level  Description: INTERVENTIONS:  - Perform BMAT or MOVE assessment daily    - Set and communicate daily mobility goal to care team and patient/family/caregiver  - Collaborate with rehabilitation services on mobility goals if consulted  - Perform Range of Motion 2-3 times a day  - Reposition patient every 2 hours    - Dangle patient 2-3 times a day  - Record patient progress and toleration of activity level   Outcome: Progressing  Goal: Patient will remain free of falls  Description: INTERVENTIONS:  - Educate patient/family on patient safety including physical limitations  - Instruct patient to call for assistance with activity   - Consult OT/PT to assist with strengthening/mobility   - Keep Call bell within reach  - Keep bed low and locked with side rails adjusted as appropriate  - Keep care items and personal belongings within reach  - Initiate and maintain comfort rounds  - Make Fall Risk Sign visible to staff  - Offer Toileting every 2 Hours, in advance of need  - Initiate/Maintain fall alarm  - Obtain necessary fall risk management equipment: call bell  - Apply yellow socks and bracelet for high fall risk patients  - Consider moving patient to room near nurses station  Outcome: Progressing     Problem: DISCHARGE PLANNING  Goal: Discharge to home or other facility with appropriate resources  Description: INTERVENTIONS:  - Identify barriers to discharge w/patient and caregiver  - Arrange for needed discharge resources and transportation as appropriate  - Identify discharge learning needs (meds, wound care, etc )  - Arrange for interpretive services to assist at discharge as needed  - Refer to Case Management Department for coordinating discharge planning if the patient needs post-hospital services based on physician/advanced practitioner order or complex needs related to functional status, cognitive ability, or social support system  Outcome: Progressing     Problem: Knowledge Deficit  Goal: Patient/family/caregiver demonstrates understanding of disease process, treatment plan, medications, and discharge instructions  Description: Complete learning assessment and assess knowledge base    Interventions:  - Provide teaching at level of understanding  - Provide teaching via preferred learning methods  Outcome: Progressing     Problem: NEUROSENSORY - ADULT  Goal: Achieves stable or improved neurological status  Description: INTERVENTIONS  - Monitor and report changes in neurological status  - Monitor vital signs such as temperature, blood pressure, glucose, and any other labs ordered   - Initiate measures to prevent increased intracranial pressure  - Monitor for seizure activity and implement precautions if appropriate      Outcome: Progressing     Problem: RESPIRATORY - ADULT  Goal: Achieves optimal ventilation and oxygenation  Description: INTERVENTIONS:  - Assess for changes in respiratory status  - Assess for changes in mentation and behavior  - Position to facilitate oxygenation and minimize respiratory effort  - Oxygen administered by appropriate delivery if ordered  - Initiate smoking cessation education as indicated  - Encourage broncho-pulmonary hygiene including cough, deep breathe, Incentive Spirometry  - Assess the need for suctioning and aspirate as needed  - Assess and instruct to report SOB or any respiratory difficulty  - Respiratory Therapy support as indicated  Outcome: Progressing     Problem: GENITOURINARY - ADULT  Goal: Maintains or returns to baseline urinary function  Description: INTERVENTIONS:  - Assess urinary function  - Encourage oral fluids to ensure adequate hydration if ordered  - Administer IV fluids as ordered to ensure adequate hydration  - Administer ordered medications as needed  - Offer frequent toileting  - Follow urinary retention protocol if ordered  Outcome: Progressing  Goal: Absence of urinary retention  Description: INTERVENTIONS:  - Assess patient's ability to void and empty bladder  - Monitor I/O  - Bladder scan as needed  - Discuss with physician/AP medications to alleviate retention as needed  - Discuss catheterization for long term situations as appropriate  Outcome: Progressing  Goal: Urinary catheter remains patent  Description: INTERVENTIONS:  - Assess patency of urinary catheter  - If patient has a chronic palacio, consider changing catheter if non-functioning  - Follow guidelines for intermittent irrigation of non-functioning urinary catheter  Outcome: Progressing     Problem: METABOLIC, FLUID AND ELECTROLYTES - ADULT  Goal: Electrolytes maintained within normal limits  Description: INTERVENTIONS:  - Monitor labs and assess patient for signs and symptoms of electrolyte imbalances  - Administer electrolyte replacement as ordered  - Monitor response to electrolyte replacements, including repeat lab results as appropriate  - Instruct patient on fluid and nutrition as appropriate  Outcome: Progressing  Goal: Fluid balance maintained  Description: INTERVENTIONS:  - Monitor labs   - Monitor I/O and WT  - Instruct patient on fluid and nutrition as appropriate  - Assess for signs & symptoms of volume excess or deficit  Outcome: Progressing  Goal: Glucose maintained within target range  Description: INTERVENTIONS:  - Monitor Blood Glucose as ordered  - Assess for signs and symptoms of hyperglycemia and hypoglycemia  - Administer ordered medications to maintain glucose within target range  - Assess nutritional intake and initiate nutrition service referral as needed  Outcome: Progressing     Problem: HEMATOLOGIC - ADULT  Goal: Maintains hematologic stability  Description: INTERVENTIONS  - Assess for signs and symptoms of bleeding or hemorrhage  - Monitor labs  - Administer supportive blood products/factors as ordered and appropriate  Outcome: Progressing     Problem: MUSCULOSKELETAL - ADULT  Goal: Maintain or return mobility to safest level of function  Description: INTERVENTIONS:  - Assess patient's ability to carry out ADLs; assess patient's baseline for ADL function and identify physical deficits which impact ability to perform ADLs (bathing, care of mouth/teeth, toileting, grooming, dressing, etc )  - Assess/evaluate cause of self-care deficits   - Assess range of motion  - Assess patient's mobility  - Assess patient's need for assistive devices and provide as appropriate  - Encourage maximum independence but intervene and supervise when necessary  - Involve family in performance of ADLs  - Assess for home care needs following discharge   - Consider OT consult to assist with ADL evaluation and planning for discharge  - Provide patient education as appropriate  Outcome: Progressing  Goal: Maintain proper alignment of affected body part  Description: INTERVENTIONS:  - Support, maintain and protect limb and body alignment  - Provide patient/ family with appropriate education  Outcome: Progressing     Problem: Prexisting or High Potential for Compromised Skin Integrity  Goal: Skin integrity is maintained or improved  Description: INTERVENTIONS:  - Identify patients at risk for skin breakdown  - Assess and monitor skin integrity  - Assess and monitor nutrition and hydration status  - Monitor labs   - Assess for incontinence   - Turn and reposition patient  - Assist with mobility/ambulation  - Relieve pressure over bony prominences  - Avoid friction and shearing  - Provide appropriate hygiene as needed including keeping skin clean and dry  - Evaluate need for skin moisturizer/barrier cream  - Collaborate with interdisciplinary team   - Patient/family teaching  - Consider wound care consult   Outcome: Progressing     Problem: Potential for Falls  Goal: Patient will remain free of falls  Description: INTERVENTIONS:  - Educate patient/family on patient safety including physical limitations  - Instruct patient to call for assistance with activity   - Consult OT/PT to assist with strengthening/mobility   - Keep Call bell within reach  - Keep bed low and locked with side rails adjusted as appropriate  - Keep care items and personal belongings within reach  - Initiate and maintain comfort rounds  - Make Fall Risk Sign visible to staff  - Offer Toileting every 2 Hours, in advance of need  - Initiate/Maintain fall alarm  - Obtain necessary fall risk management equipment: call bell  - Apply yellow socks and bracelet for high fall risk patients  - Consider moving patient to room near nurses station  Outcome: Progressing

## 2022-02-15 NOTE — ASSESSMENT & PLAN NOTE
Wt Readings from Last 3 Encounters:   02/17/22 77 2 kg (170 lb 3 1 oz)   02/15/22 83 2 kg (183 lb 6 8 oz)   01/12/22 82 7 kg (182 lb 5 1 oz)     Patient presents with worsening chest pain and dyspnea  BNP 1400  CTA PE showed no PE, bilateral groundglass airspace disease in a mostly perihilar distribution likely secondary to CHF (although component of residual COVID pneumonia not excluded), moderate bilateral pleural effusions  Developed hypotension after diuresis prompting slower diuresis with lasix gtt and BP monitoring  Has been hemodynamically stable, so transfer to [facility] for potential cath      Plan  - Pulm consulted regarding bilateral pulmonary effusions, they determined it is not necessary to perform thoracentesis   - Continue lasix gtt to IV lasix 80mg  - Continue metoprolol to 25mg due to tachycardia  - Monitor vital signs  - Continue lisinopril   - Daily weights  - Strict I/O  - Wean supplemental O2 as tolerated  - Fluid restriction 1800 cc and 2g sodium diet   - See "Elevated Troponin" for further plan

## 2022-02-15 NOTE — ASSESSMENT & PLAN NOTE
Has been occurring for the last month, likely associated with COVID-19 pneumonia      Plan  - Daily CBC  - Continue diuresis as above  - Wean supplemental O2 as tolerated

## 2022-02-15 NOTE — ASSESSMENT & PLAN NOTE
Wt Readings from Last 3 Encounters:   02/15/22 83 2 kg (183 lb 6 8 oz)   01/12/22 82 7 kg (182 lb 5 1 oz)     Patient was hospitalized between 1/5-1/12/2022 for COVID pneumonia and hypoxia  He developed chest pain while hospitalized, with mildly elevated troponin - subsequent ECHO 1/10 with LVEF 40%, moderate global hypokinesis with regional variations, moderate MR  Plan had been to weight complete resolution of patient's acute illness with COVID, and pursue outpatient ischemic evaluation with a nuclear stress test     Mr Aleksandra Sadler presented earlier today with worsening hypoxia and dyspnea, elevated proBNP, and CT findings consistent with volume overload and moderate pleural effusions  · Daily weights  · Insure low-sodium, fluid-restricted diet when eating  · Diuresed well with IV furosemide push, but with development of hypotension  · Transitioned to furosemide gtt with improved blood pressure and adequate urine output  · Continue low-dose beta-blocker, ASA, and statin  Patient with concurrent elevation in troponin and ECG changes - may represent a type 2 myocardial infarction in the setting of demand ischemia, but given risk factors for CAD, EKG changes, as well as newly diagnosed cardiomyopathy with wall motion abnormalities will likely benefit from a left heart catheterization  Patient was accepted in transfer to [facility] following discussion with Dr Brian Hurd of Cardiology - accepted by medicine team Dr Andrew Farfan attending

## 2022-02-15 NOTE — PHYSICAL THERAPY NOTE
PHYSICAL THERAPY          Patient Name: Justin Carranza  [id number] Date: 2/15/2022        02/15/22 0951   PT Last Visit   PT Visit Date 02/15/22   Note Type   Note type Cancelled Session   Cancel Reasons Other   Additional Comments Order received and chart review performed  Pt being transferred to [facility] this date for higher level of care  Will d/c current PT orders and recommend PT intervention at alternate campus when medically appropriate         Miguelina Law, PT

## 2022-02-15 NOTE — PLAN OF CARE
Problem: MOBILITY - ADULT  Goal: Maintain or return to baseline ADL function  Description: INTERVENTIONS:  -  Assess patient's ability to carry out ADLs; assess patient's baseline for ADL function and identify physical deficits which impact ability to perform ADLs (bathing, care of mouth/teeth, toileting, grooming, dressing, etc )  - Assess/evaluate cause of self-care deficits   - Assess range of motion  - Assess patient's mobility; develop plan if impaired  - Assess patient's need for assistive devices and provide as appropriate  - Encourage maximum independence but intervene and supervise when necessary  - Involve family in performance of ADLs  - Assess for home care needs following discharge   - Consider OT consult to assist with ADL evaluation and planning for discharge  - Provide patient education as appropriate  Outcome: Progressing  Goal: Maintains/Returns to pre admission functional level  Description: INTERVENTIONS:  - Perform BMAT or MOVE assessment daily    - Set and communicate daily mobility goal to care team and patient/family/caregiver  - Collaborate with rehabilitation services on mobility goals if consulted  - Perform Range of Motion 2-3 times a day  - Reposition patient every 2 hours    - Dangle patient 2-3 times a day  - Record patient progress and toleration of activity level   Outcome: Progressing     Problem: PAIN - ADULT  Goal: Verbalizes/displays adequate comfort level or baseline comfort level  Description: Interventions:  - Encourage patient to monitor pain and request assistance  - Assess pain using appropriate pain scale  - Administer analgesics based on type and severity of pain and evaluate response  - Implement non-pharmacological measures as appropriate and evaluate response  - Consider cultural and social influences on pain and pain management  - Notify physician/advanced practitioner if interventions unsuccessful or patient reports new pain  Outcome: Progressing     Problem: SAFETY ADULT  Goal: Maintain or return to baseline ADL function  Description: INTERVENTIONS:  -  Assess patient's ability to carry out ADLs; assess patient's baseline for ADL function and identify physical deficits which impact ability to perform ADLs (bathing, care of mouth/teeth, toileting, grooming, dressing, etc )  - Assess/evaluate cause of self-care deficits   - Assess range of motion  - Assess patient's mobility; develop plan if impaired  - Assess patient's need for assistive devices and provide as appropriate  - Encourage maximum independence but intervene and supervise when necessary  - Involve family in performance of ADLs  - Assess for home care needs following discharge   - Consider OT consult to assist with ADL evaluation and planning for discharge  - Provide patient education as appropriate  Outcome: Progressing  Goal: Maintains/Returns to pre admission functional level  Description: INTERVENTIONS:  - Perform BMAT or MOVE assessment daily    - Set and communicate daily mobility goal to care team and patient/family/caregiver  - Collaborate with rehabilitation services on mobility goals if consulted  - Perform Range of Motion 2-3 times a day  - Reposition patient every 2 hours    - Dangle patient 2-3 times a day  - Record patient progress and toleration of activity level   Outcome: Progressing  Goal: Patient will remain free of falls  Description: INTERVENTIONS:  - Educate patient/family on patient safety including physical limitations  - Instruct patient to call for assistance with activity   - Consult OT/PT to assist with strengthening/mobility   - Keep Call bell within reach  - Keep bed low and locked with side rails adjusted as appropriate  - Keep care items and personal belongings within reach  - Initiate and maintain comfort rounds  - Make Fall Risk Sign visible to staff  - Offer Toileting every 2 Hours, in advance of need  - Initiate/Maintain fall alarm  - Obtain necessary fall risk management equipment: call bell  - Apply yellow socks and bracelet for high fall risk patients  - Consider moving patient to room near nurses station  Outcome: Progressing     Problem: DISCHARGE PLANNING  Goal: Discharge to home or other facility with appropriate resources  Description: INTERVENTIONS:  - Identify barriers to discharge w/patient and caregiver  - Arrange for needed discharge resources and transportation as appropriate  - Identify discharge learning needs (meds, wound care, etc )  - Arrange for interpretive services to assist at discharge as needed  - Refer to Case Management Department for coordinating discharge planning if the patient needs post-hospital services based on physician/advanced practitioner order or complex needs related to functional status, cognitive ability, or social support system  Outcome: Progressing     Problem: Knowledge Deficit  Goal: Patient/family/caregiver demonstrates understanding of disease process, treatment plan, medications, and discharge instructions  Description: Complete learning assessment and assess knowledge base    Interventions:  - Provide teaching at level of understanding  - Provide teaching via preferred learning methods  Outcome: Progressing     Problem: NEUROSENSORY - ADULT  Goal: Achieves stable or improved neurological status  Description: INTERVENTIONS  - Monitor and report changes in neurological status  - Monitor vital signs such as temperature, blood pressure, glucose, and any other labs ordered   - Initiate measures to prevent increased intracranial pressure  - Monitor for seizure activity and implement precautions if appropriate      Outcome: Progressing     Problem: RESPIRATORY - ADULT  Goal: Achieves optimal ventilation and oxygenation  Description: INTERVENTIONS:  - Assess for changes in respiratory status  - Assess for changes in mentation and behavior  - Position to facilitate oxygenation and minimize respiratory effort  - Oxygen administered by appropriate delivery if ordered  - Initiate smoking cessation education as indicated  - Encourage broncho-pulmonary hygiene including cough, deep breathe, Incentive Spirometry  - Assess the need for suctioning and aspirate as needed  - Assess and instruct to report SOB or any respiratory difficulty  - Respiratory Therapy support as indicated  Outcome: Progressing     Problem: GENITOURINARY - ADULT  Goal: Maintains or returns to baseline urinary function  Description: INTERVENTIONS:  - Assess urinary function  - Encourage oral fluids to ensure adequate hydration if ordered  - Administer IV fluids as ordered to ensure adequate hydration  - Administer ordered medications as needed  - Offer frequent toileting  - Follow urinary retention protocol if ordered  Outcome: Progressing  Goal: Absence of urinary retention  Description: INTERVENTIONS:  - Assess patient's ability to void and empty bladder  - Monitor I/O  - Bladder scan as needed  - Discuss with physician/AP medications to alleviate retention as needed  - Discuss catheterization for long term situations as appropriate  Outcome: Progressing  Goal: Urinary catheter remains patent  Description: INTERVENTIONS:  - Assess patency of urinary catheter  - If patient has a chronic palacio, consider changing catheter if non-functioning  - Follow guidelines for intermittent irrigation of non-functioning urinary catheter  Outcome: Progressing     Problem: METABOLIC, FLUID AND ELECTROLYTES - ADULT  Goal: Electrolytes maintained within normal limits  Description: INTERVENTIONS:  - Monitor labs and assess patient for signs and symptoms of electrolyte imbalances  - Administer electrolyte replacement as ordered  - Monitor response to electrolyte replacements, including repeat lab results as appropriate  - Instruct patient on fluid and nutrition as appropriate  Outcome: Progressing  Goal: Fluid balance maintained  Description: INTERVENTIONS:  - Monitor labs   - Monitor I/O and WT  - Instruct patient on fluid and nutrition as appropriate  - Assess for signs & symptoms of volume excess or deficit  Outcome: Progressing  Goal: Glucose maintained within target range  Description: INTERVENTIONS:  - Monitor Blood Glucose as ordered  - Assess for signs and symptoms of hyperglycemia and hypoglycemia  - Administer ordered medications to maintain glucose within target range  - Assess nutritional intake and initiate nutrition service referral as needed  Outcome: Progressing     Problem: HEMATOLOGIC - ADULT  Goal: Maintains hematologic stability  Description: INTERVENTIONS  - Assess for signs and symptoms of bleeding or hemorrhage  - Monitor labs  - Administer supportive blood products/factors as ordered and appropriate  Outcome: Progressing     Problem: MUSCULOSKELETAL - ADULT  Goal: Maintain or return mobility to safest level of function  Description: INTERVENTIONS:  - Assess patient's ability to carry out ADLs; assess patient's baseline for ADL function and identify physical deficits which impact ability to perform ADLs (bathing, care of mouth/teeth, toileting, grooming, dressing, etc )  - Assess/evaluate cause of self-care deficits   - Assess range of motion  - Assess patient's mobility  - Assess patient's need for assistive devices and provide as appropriate  - Encourage maximum independence but intervene and supervise when necessary  - Involve family in performance of ADLs  - Assess for home care needs following discharge   - Consider OT consult to assist with ADL evaluation and planning for discharge  - Provide patient education as appropriate  Outcome: Progressing  Goal: Maintain proper alignment of affected body part  Description: INTERVENTIONS:  - Support, maintain and protect limb and body alignment  - Provide patient/ family with appropriate education  Outcome: Progressing

## 2022-02-15 NOTE — PROGRESS NOTES
Pt transported to [facility] via stretcher by [facility] ambulance  NAD at time of transport  Pt remains on heparin @ 14 units/kg/hr and lasix @ 80mg/hr

## 2022-02-15 NOTE — RESPIRATORY THERAPY NOTE
RT Protocol Note  Sheila Dust 81 y o  male MRN: [id number]  Unit/Bed#: 423-01 Encounter: [id number]    Assessment    Principal Problem:    Acute on chronic respiratory failure with hypoxia ([facility] [address] )  Active Problems:    Other constipation    Systolic congestive heart failure ([facility] [address] )      Home Pulmonary Medications:    Home Devices/Therapy: Home O2    Past Medical History:   Diagnosis Date    CHF (congestive heart failure) ([facility])     COVID     Paraplegia ([facility])     x30 years     Social History     Socioeconomic History    Marital status: /Civil Union     Spouse name: None    Number of children: None    Years of education: None    Highest education level: None   Occupational History    None   Tobacco Use    Smoking status: Never Smoker    Smokeless tobacco: Never Used   Vaping Use    Vaping Use: Never used   Substance and Sexual Activity    Alcohol use: Never     Alcohol/week: 0 0 standard drinks    Drug use: Never    Sexual activity: None   Other Topics Concern    None   Social History Narrative    None     Social Determinants of Health     Financial Resource Strain: Not on file   Food Insecurity: No Food Insecurity    Worried About Running Out of Food in the Last Year: Never true    [place] of Food in the Last Year: Never true   Transportation Needs: No Transportation Needs    Lack of Transportation (Medical): No    Lack of Transportation (Non-Medical):  No   Physical Activity: Not on file   Stress: Not on file   Social Connections: Not on file   Intimate Partner Violence: Not on file   Housing Stability: Low Risk     Unable to Pay for Housing in the Last Year: No    Number of Places Lived in the Last Year: 1    Unstable Housing in the Last Year: No       Subjective         Objective    Physical Exam:   Assessment Type: Assess only  General Appearance: Alert,Awake  Respiratory Pattern: Normal  Chest Assessment: Chest expansion symmetrical  Bilateral Breath Sounds: Diminished,Coarse  Cough: Non-productive,Congested  O2 Device: NC    Vitals:  Blood pressure 113/85, pulse (!) 118, temperature 97 5 °F (36 4 °C), temperature source Temporal, resp  rate 20, height 5' 4" (1 626 m), weight 83 2 kg (183 lb 6 8 oz), SpO2 93 %  Imaging and other studies: I have personally reviewed pertinent reports        O2 Device: NC     Plan    Respiratory Plan: Mild Distress pathway      Will make treatments with 1 25mg Xopenex/0 5mg Atrovent TID and 2 5mg Albuterol Q4 PRN

## 2022-02-15 NOTE — OCCUPATIONAL THERAPY NOTE
Occupational Therapy Evaluation Cancel Note     Patient Name: Judy Hobbs  DRJLJ'O Date: 2/15/2022  Problem List  Principal Problem:    Acute on chronic respiratory failure with hypoxia ([address] )  Active Problems:    Elevated troponin    Other constipation    Systolic congestive heart failure [facility])    Past Medical History  Past Medical History:   Diagnosis Date    CHF (congestive heart failure) ([facility])     COVID     Paraplegia ([facility])     x30 years     Past Surgical History  Past Surgical History:   Procedure Laterality Date    BACK SURGERY               02/15/22 1339   OT Last Visit   OT Visit Date 02/15/22   Note Type   Note type Cancelled Session   Cancel Reasons Other   Additional Comments Pt scheduled for transfer       Order received and chart review performed; per chart review pt is scheduled to be transferred to [facility] on today's date  Will hold on OT evaluation  OT will continue to follow and evaluate if change in transfer plan      Marikay Goltz, OT

## 2022-02-15 NOTE — ASSESSMENT & PLAN NOTE
Patient with reported episodes of hemoptysis at home - none over the course of the last 30+ hours  Hemoptysis was deemed to be insignificant and essentially resolved at this time by pulmonology consult, with no indication for bronchoscopy at this time  Continue to monitor H&H, and evaluate patient for any recurrence

## 2022-02-15 NOTE — ASSESSMENT & PLAN NOTE
Neurogenic bladder related to patient's underlying paraplegia - chronically self caths  Currently with Silver catheter in place in the setting of need for accurate I/O's

## 2022-02-15 NOTE — H&P
INTERNAL MEDICINE RESIDENCY ADMISSION H&P     Name: Jaylin Barillas   Age & Sex: 81 y o  male   MRN: [id number]  Unit/Bed#: [facility] 431-01   Encounter: [id number]  Primary Care Provider: No primary care provider on file  Code Status: Level 3 - DNAR and DNI  Admission Status: INPATIENT   Disposition: Patient requires Med/Surg    Admit to team: SOD Team B     ASSESSMENT/PLAN     Principal Problem:    Elevated troponin  Active Problems:    Acute on chronic systolic congestive heart failure (HCC)    Paraplegia (HCC)    Acute on chronic respiratory failure with hypoxia (HCC)    Hemoptysis    Type 2 diabetes mellitus (HCC)      * Elevated troponin  Assessment & Plan  On prior admission (1/5/22), patient had initial hsTrop elevation to 85, which was deemed non-MI troponin elevation in setting of normal ECG  Developed new chest pain later that admission with new hsTrop elevation to >1600  ECG did not show acute ST changes, so he was sent home on ASA, plavix, statin, BB therapy  Patient was initially discharged with plan for outpatient nuclear stress test     Readmitted this time for chest pain and worsening shortness of breath, likely CHF exacerbation  HsTrop on admission 872 - peak 5732 - 4650  Associated with recent new reduction in LVEF  Transferred to [facility] for cardiac cath  Plan  - Cardiology consult; potential plan for Cath  - Continue ASA, plavix, high intensity statin, low-dose BB  - Continue telemetry  - Monitor vital signs  - Consider repeat echo    Acute on chronic systolic congestive heart failure (HCC)  Assessment & Plan  Wt Readings from Last 3 Encounters:   02/15/22 83 2 kg (183 lb 6 8 oz)   01/12/22 82 7 kg (182 lb 5 1 oz)     Patient presents with worsening chest pain and dyspnea  BNP 1400   CTA PE showed no PE, bilateral groundglass airspace disease in a mostly perihilar distribution likely secondary to CHF (although component of residual COVID pneumonia not excluded), moderate bilateral pleural effusions  Developed hypotension after diuresis prompting slower diuresis with lasix gtt and BP monitoring  Has been hemodynamically stable, so transfer to [facility] for cath  Plan  - Lasix gtt  - Monitor vital signs  - Metoprolol, lisinopril  - Daily weights  - Strict I/O  - Wean supplemental O2 as tolerated  - Consider fluid restriction    Hemoptysis  Assessment & Plan  Has been occurring for the last month, likely associated with COVID-19 pneumonia  Plan  - Daily CBC  - Continue diuresis as above  - Wean supplemental O2 as tolerated    Acute on chronic respiratory failure with hypoxia (HCC)  Assessment & Plan  See A/P for acute on chronic systolic congestive heart failure  Had recent COVID infection and was weaned off NC O2 before worsening over again in context of the heart failure  Paraplegia [facility])  Assessment & Plan  Baseline paraplegia  - No intervention      VTE Pharmacologic Prophylaxis: Heparin  VTE Mechanical Prophylaxis: sequential compression device    CHIEF COMPLAINT   No chief complaint on file  HISTORY OF PRESENT ILLNESS     Mr Ignacia Villegas is a 51-year-old male with a past medical history of paraplegia, neurogenic bladder (patient performs his own straight-caths at home), CHF EF 40%, recent COVID infection last month requiring hospitalization  Patient was recently discharged from [facility] on 01/12/2022 from [facility] at that time was found to have an ejection fraction of 40%  Nuclear stress test was recommended as outpatient and patient was discharged home with 2 L nasal cannula  Patient states that approximately 1 week ago he began to have worsening of shortness of breath  Began to experience orthopnea any started wearing his supplemental oxygen at 5 L nasal cannula  He notes that if his oxygen saturation drops into the 80s, he would experience chest discomfort describes as a squeezing or tightening  When his oxygen levels improved discomfort go away    He has chronic lymphedema but states he noticed worsening of this as well  This is particularly true for his left leg  In addition, he has been coughing, and sometimes he was having hemoptysis  He reported the symptoms to his pulmonologist on 02/11 during a telemedicine visit  A chest x-ray was ordered which showed persistent findings related to COVID-19  Prednisone was ordered for the patient of which she took 2 doses but then later came to the ER for worsening symptoms  Upon admission he had a CTA which revealed moderate bilateral pleural effusions  His BNP was elevated at 14 68 which is increased compared to prior  He was given Lasix 40 mg IV x1 dose with good urinary output although he did become hypotensive  His high sensitivity troponin levels were 872, 1826, 2747, 4425, 5007 are 32  He had EKG performed which showed ST/T-wave changes in inferior and lateral leads  He was started on heparin drip and was recommended by Cardiology to be transferred for ischemic evaluation at [place] [address]  Upon arrival at [facility], the patient was seen and examined by myself along with senior resident  Patient states that he feels generally well and improved since he was admitted  He presently denies any chest pain, states that he has not had any chest pain since he has been on supplemental oxygen while in the hospital   He he presently denies any fever, chills, nausea, vomiting, diarrhea, constipation, chest pain, shortness of breath  Upon his arrival I immediately notified Cardiology team   Cardiology team will take patient for heart catheterization tomorrow  I relayed this information to patient, who was agreeable with plan  Patient confirms that he is a level 3 code status  Patient is agreeable to intubation during catheterization procedure if necessary  He states that his primary contact is his wife, Fred Santlilan, whose contact information is listed in chart       REVIEW OF SYSTEMS     Review of Systems   Constitutional: Negative for activity change, appetite change, fatigue and fever  HENT: Negative for congestion, rhinorrhea and sore throat  Eyes: Negative for pain and redness  Respiratory: Positive for cough  Negative for chest tightness, shortness of breath and wheezing  Cardiovascular: Positive for leg swelling  Negative for chest pain and palpitations  Gastrointestinal: Positive for abdominal distention  Negative for abdominal pain, constipation, diarrhea, nausea and vomiting  Endocrine: Negative for cold intolerance, heat intolerance and polydipsia  Genitourinary: Negative for dysuria, flank pain and hematuria  Musculoskeletal: Positive for back pain and gait problem  Negative for arthralgias  Skin: Negative for rash and wound  Neurological: Positive for weakness (patient is paraplegic)  Negative for dizziness, syncope, light-headedness, numbness and headaches  Psychiatric/Behavioral: Negative for agitation, behavioral problems and confusion  OBJECTIVE     Vitals:    02/15/22 1837   SpO2: 94%      Temperature:   Temp (24hrs), Av °F (36 7 °C), Min:97 2 °F (36 2 °C), Max:99 5 °F (37 5 °C)       Intake & Output:  I/O     None        Weights: There is no height or weight on file to calculate BMI  Weight (last 2 days)     None        Physical Exam  Constitutional:       General: He is not in acute distress  Appearance: Normal appearance  He is obese  HENT:      Head: Normocephalic and atraumatic  Mouth/Throat:      Mouth: Mucous membranes are moist       Pharynx: Oropharynx is clear  Eyes:      Extraocular Movements: Extraocular movements intact  Conjunctiva/sclera: Conjunctivae normal       Pupils: Pupils are equal, round, and reactive to light  Cardiovascular:      Rate and Rhythm: Normal rate and regular rhythm  Pulses: Normal pulses  Heart sounds: Normal heart sounds  No murmur heard        Pulmonary:      Effort: Pulmonary effort is normal  No respiratory distress  Breath sounds: Rales present  No wheezing or rhonchi  Abdominal:      General: Abdomen is flat  Bowel sounds are normal  There is distension  Palpations: Abdomen is soft  Tenderness: There is no abdominal tenderness  Musculoskeletal:         General: Swelling (chronic bilateral lower extremity swelling, left greater than right  ) present  Right lower leg: Edema present  Left lower leg: Edema present  Comments: 3+ pitting edema in bilateral lower extremities   Skin:     General: Skin is warm and dry  Capillary Refill: Capillary refill takes less than 2 seconds  Findings: Erythema (chronic erythema of LE, particularly left) present  Neurological:      Mental Status: He is alert and oriented to person, place, and time  Motor: Weakness (Patient is chronic paraplegic and unalbe to move bilateral lower extremities) present  Psychiatric:         Mood and Affect: Mood normal          Behavior: Behavior normal          Thought Content: Thought content normal        PAST MEDICAL HISTORY     Past Medical History:   Diagnosis Date    CHF [facility]congestive heart failure) ([facility] [address] )     COVID     Paraplegia (HCC)     x30 years     PAST SURGICAL HISTORY     Past Surgical History:   Procedure Laterality Date    BACK SURGERY       SOCIAL & FAMILY HISTORY     Social History     Substance and Sexual Activity   Alcohol Use Never    Alcohol/week: 0 0 standard drinks     Substance and Sexual Activity   Alcohol Use Never    Alcohol/week: 0 0 standard drinks        Substance and Sexual Activity   Drug Use Never     Social History     Tobacco Use   Smoking Status Never Smoker   Smokeless Tobacco Never Used     Family History   Problem Relation Age of Onset    Cancer Mother     Stroke Mother     Cancer Father     Alcohol abuse Father      LABORATORY DATA     Labs: I have personally reviewed pertinent reports      Results from last 7 days   Lab Units 02/15/22  0530 02/14/22  2242   WBC Thousand/uL 11 75* 8 56   HEMOGLOBIN g/dL 11 0* 12 0   HEMATOCRIT % 34 3* 37 4   PLATELETS Thousands/uL 281  281 285   NEUTROS PCT % 76* 90*   MONOS PCT % 10 4      Results from last 7 days   Lab Units 02/14/22  2243   POTASSIUM mmol/L 4 0   CHLORIDE mmol/L 106   CO2 mmol/L 23   BUN mg/dL 21   CREATININE mg/dL 0 85   CALCIUM mg/dL 8 5   ALK PHOS U/L 87   ALT U/L 44   AST U/L 50*              Results from last 7 days   Lab Units 02/15/22  1228 02/15/22  0534 02/14/22  2244   INR   --  1 12 1 16   PTT seconds 57* 37 39*     Results from last 7 days   Lab Units 02/15/22  0133   LACTIC ACID mmol/L 2 0         Micro:  Lab Results   Component Value Date    BLOODCX Received in Microbiology Lab  Culture in Progress  02/14/2022    BLOODCX Received in Microbiology Lab  Culture in Progress  02/14/2022    URINECX No Growth <1000 cfu/mL 01/06/2022     IMAGING & DIAGNOSTIC TESTS     Imaging: I have personally reviewed pertinent reports  CTA ED chest PE study    Result Date: 2/15/2022  Impression: No evidence of pulmonary embolus  Bilateral groundglass airspace disease in a mostly perihilar distribution likely secondary to CHF although component of residual COVID pneumonia not excluded  Moderate bilateral pleural effusions Workstation performed: [id number]     EKG, Pathology, and Other Studies: I have personally reviewed pertinent reports  ALLERGIES   No Known Allergies  MEDICATIONS PRIOR TO ARRIVAL     Prior to Admission medications    Medication Sig Start Date End Date Taking?  Authorizing Provider   aspirin (ECOTRIN LOW STRENGTH) 81 mg EC tablet Take 1 tablet (81 mg total) by mouth daily 1/13/22   Rajiv Pastrana MD   clopidogrel (PLAVIX) 75 mg tablet Take 1 tablet (75 mg total) by mouth daily  Patient not taking: Reported on 2/15/2022  1/13/22   Rajiv Pastrana MD   cyanocobalamin (VITAMIN B-12) 100 MCG tablet Take 100 mcg by mouth daily    Historical Provider, MD metoprolol tartrate (LOPRESSOR) 25 mg tablet Take 1 tablet (25 mg total) by mouth every 12 (twelve) hours  Patient not taking: Reported on 2/15/2022  1/12/22   Sharon Choi MD   nitroglycerin (NITROSTAT) 0 4 mg SL tablet Place 1 tablet (0 4 mg total) under the tongue every 5 (five) minutes as needed for chest pain  Patient not taking: Reported on 2/15/2022  1/12/22   Sharon Choi MD   predniSONE 20 mg tablet Take 2 tablets (40 mg total) by mouth daily for 7 days, THEN 1 5 tablets (30 mg total) daily for 7 days, THEN 1 tablet (20 mg total) daily for 7 days, THEN 0 5 tablets (10 mg total) daily for 7 days   2/14/22 3/14/22  Capo Vasquez MD     MEDICATIONS ADMINISTERED IN LAST 24 HOURS     Medication Administration - last 24 hours from 02/14/2022 1859 to 02/15/2022 1859       Date/Time Order Dose Route Action Action by     02/15/2022 1832 docusate sodium (COLACE) capsule 100 mg 100 mg Oral Refused Chanel Howe RN     02/15/2022 1836 heparin (porcine) 25,000 units in 0 45% in sodium chloride 250 ml infusion (CMPD) 12 Units/kg/hr Intravenous Restarted Chanel Howe RN     02/15/2022 1833 furosemide (LASIX) 500 mg infusion 50 mL 8 mg/hr Intravenous Restarted Chanel Howe RN        CURRENT MEDICATIONS     Current Facility-Administered Medications   Medication Dose Route Frequency Provider Last Rate    acetaminophen  650 mg Oral Q6H PRN Sharon Choi MD      [START ON 2/16/2022] aspirin  81 mg Oral Daily Sharon Choi MD      Syliva Linea ON 2/16/2022] atorvastatin  40 mg Oral Daily With To Voss MD      [START ON 2/16/2022] clopidogrel  75 mg Oral Daily Sharon Choi MD      docusate sodium  100 mg Oral BID Sharon Choi MD      furosemide  8 mg/hr Intravenous Continuous Sharon Choi MD 8 mg/hr (02/15/22 1833)    heparin (porcine)  3-20 Units/kg/hr (Order-Specific) Intravenous Titrated Sharon Choi MD 12 Units/kg/hr (02/15/22 1836)    heparin (porcine)  2,000 Units Intravenous Q1H PRN Arnol Chen MD      heparin (porcine)  4,000 Units Intravenous Q1H PRN Arnol Chen MD      metoprolol tartrate  12 5 mg Oral [facility] Arnol Chen MD      polyethylene glycol  17 g Oral Daily PRN Arnol Chen MD      senna  3 tablet Oral BID PRN Arnol Chen MD       furosemide, 8 mg/hr, Last Rate: 8 mg/hr (02/15/22 1833)  heparin (porcine), 3-20 Units/kg/hr (Order-Specific), Last Rate: 12 Units/kg/hr (02/15/22 1836)      acetaminophen, 650 mg, Q6H PRN  heparin (porcine), 2,000 Units, Q1H PRN  heparin (porcine), 4,000 Units, Q1H PRN  polyethylene glycol, 17 g, Daily PRN  senna, 3 tablet, BID PRN        Admission Time  I spent 45 minutes admitting the patient  This involved direct patient contact where I performed a full history and physical, reviewing previous records, and reviewing laboratory and other diagnostic studies  Portions of the record may have been created with voice recognition software  Occasional wrong word or "sound a like" substitutions may have occurred due to the inherent limitations of voice recognition software    Read the chart carefully and recognize, using context, where substitutions have occurred     ==  Jaqueline Feng, DO  [address]  Internal Medicine Residency PGY-1

## 2022-02-15 NOTE — ASSESSMENT & PLAN NOTE
Suspect likely on the basis of acute CHF exacerbation  Patient also with complaints of hemoptysis  Evaluation and treatment as below  Of note, patient was hospitalized approximately 1 month ago for COVID-19 pneumonia, for which he responded well to treatment and was discharged on 2 L of supplemental oxygen  He initially tested positive on 01/05/2022, with improved symptoms prior to discharge  Per pulmonology consult, despite elevation in CRP would not recommend COVID directed therapy at this time  Hypoxic respiratory failure is likely in the setting of acute CHF exacerbation

## 2022-02-15 NOTE — ASSESSMENT & PLAN NOTE
See A/P for acute on chronic systolic congestive heart failure  Had recent COVID infection and was weaned off NC O2 before worsening over again in context of the heart failure

## 2022-02-16 ENCOUNTER — APPOINTMENT (INPATIENT)
Dept: NON INVASIVE DIAGNOSTICS | Facility: HOSPITAL | Age: 72
DRG: 246 | End: 2022-02-16
Payer: MEDICARE

## 2022-02-16 LAB
ALBUMIN SERPL BCP-MCNC: 2.9 G/DL (ref 3.5–5)
ALP SERPL-CCNC: 65 U/L (ref 46–116)
ALT SERPL W P-5'-P-CCNC: 34 U/L (ref 12–78)
ANION GAP SERPL CALCULATED.3IONS-SCNC: 9 MMOL/L (ref 4–13)
ANION GAP SERPL CALCULATED.3IONS-SCNC: 9 MMOL/L (ref 4–13)
APICAL FOUR CHAMBER EJECTION FRACTION: 18 %
APTT PPP: 38 SECONDS (ref 23–37)
APTT PPP: 41 SECONDS (ref 23–37)
AST SERPL W P-5'-P-CCNC: 59 U/L (ref 5–45)
ATRIAL RATE: 135 BPM
BASOPHILS # BLD AUTO: 0.07 THOUSANDS/ΜL (ref 0–0.1)
BASOPHILS NFR BLD AUTO: 1 % (ref 0–1)
BILIRUB SERPL-MCNC: 0.88 MG/DL (ref 0.2–1)
BUN SERPL-MCNC: 16 MG/DL (ref 5–25)
BUN SERPL-MCNC: 18 MG/DL (ref 5–25)
CALCIUM ALBUM COR SERPL-MCNC: 8.9 MG/DL (ref 8.3–10.1)
CALCIUM SERPL-MCNC: 8 MG/DL (ref 8.3–10.1)
CALCIUM SERPL-MCNC: 8.2 MG/DL (ref 8.3–10.1)
CHLORIDE SERPL-SCNC: 103 MMOL/L (ref 100–108)
CHLORIDE SERPL-SCNC: 99 MMOL/L (ref 100–108)
CHOLEST SERPL-MCNC: 190 MG/DL
CO2 SERPL-SCNC: 26 MMOL/L (ref 21–32)
CO2 SERPL-SCNC: 29 MMOL/L (ref 21–32)
CREAT SERPL-MCNC: 0.7 MG/DL (ref 0.6–1.3)
CREAT SERPL-MCNC: 0.85 MG/DL (ref 0.6–1.3)
EOSINOPHIL # BLD AUTO: 0.43 THOUSAND/ΜL (ref 0–0.61)
EOSINOPHIL NFR BLD AUTO: 5 % (ref 0–6)
ERYTHROCYTE [DISTWIDTH] IN BLOOD BY AUTOMATED COUNT: 14.9 % (ref 11.6–15.1)
FERRITIN SERPL-MCNC: 114 NG/ML (ref 8–388)
FOLATE SERPL-MCNC: 16.3 NG/ML (ref 3.1–17.5)
GFR SERPL CREATININE-BSD FRML MDRD: 88 ML/MIN/1.73SQ M
GFR SERPL CREATININE-BSD FRML MDRD: 95 ML/MIN/1.73SQ M
GLUCOSE SERPL-MCNC: 118 MG/DL (ref 65–140)
GLUCOSE SERPL-MCNC: 145 MG/DL (ref 65–140)
HCT VFR BLD AUTO: 31.4 % (ref 36.5–49.3)
HDLC SERPL-MCNC: 28 MG/DL
HGB BLD-MCNC: 10.1 G/DL (ref 12–17)
IMM GRANULOCYTES # BLD AUTO: 0.04 THOUSAND/UL (ref 0–0.2)
IMM GRANULOCYTES NFR BLD AUTO: 0 % (ref 0–2)
IRON SATN MFR SERPL: 12 % (ref 20–50)
IRON SERPL-MCNC: 37 UG/DL (ref 65–175)
LDLC SERPL CALC-MCNC: 135 MG/DL (ref 0–100)
LEFT VENTRICLE DIASTOLIC VOLUME (MOD BIPLANE): 179 ML (ref 92.09–207.39)
LEFT VENTRICLE SYSTOLIC VOLUME (MOD BIPLANE): 147 ML
LV EF: 18 %
LYMPHOCYTES # BLD AUTO: 1.78 THOUSANDS/ΜL (ref 0.6–4.47)
LYMPHOCYTES NFR BLD AUTO: 20 % (ref 14–44)
MAGNESIUM SERPL-MCNC: 1.9 MG/DL (ref 1.6–2.6)
MCH RBC QN AUTO: 28.8 PG (ref 26.8–34.3)
MCHC RBC AUTO-ENTMCNC: 32.2 G/DL (ref 31.4–37.4)
MCV RBC AUTO: 90 FL (ref 82–98)
MONOCYTES # BLD AUTO: 1.01 THOUSAND/ΜL (ref 0.17–1.22)
MONOCYTES NFR BLD AUTO: 11 % (ref 4–12)
NEUTROPHILS # BLD AUTO: 5.74 THOUSANDS/ΜL (ref 1.85–7.62)
NEUTS SEG NFR BLD AUTO: 63 % (ref 43–75)
NRBC BLD AUTO-RTO: 0 /100 WBCS
P AXIS: 50 DEGREES
PHOSPHATE SERPL-MCNC: 3.8 MG/DL (ref 2.3–4.1)
PLATELET # BLD AUTO: 253 THOUSANDS/UL (ref 149–390)
PMV BLD AUTO: 10.8 FL (ref 8.9–12.7)
POTASSIUM SERPL-SCNC: 2.9 MMOL/L (ref 3.5–5.3)
POTASSIUM SERPL-SCNC: 3 MMOL/L (ref 3.5–5.3)
PR INTERVAL: 158 MS
PROT SERPL-MCNC: 7 G/DL (ref 6.4–8.2)
QRS AXIS: -77 DEGREES
QRSD INTERVAL: 90 MS
QT INTERVAL: 336 MS
QTC INTERVAL: 504 MS
RA PRESSURE ESTIMATED: 5 MMHG
RBC # BLD AUTO: 3.51 MILLION/UL (ref 3.88–5.62)
RV PSP: 46 MMHG
SL CV LV DIAS VOL ENDO Z SCORE: 1.01
SL CV LV EF: 20
SODIUM SERPL-SCNC: 137 MMOL/L (ref 136–145)
SODIUM SERPL-SCNC: 138 MMOL/L (ref 136–145)
T WAVE AXIS: 82 DEGREES
TIBC SERPL-MCNC: 301 UG/DL (ref 250–450)
TR MAX PG: 41 MMHG
TR PEAK VELOCITY: 3.2 M/S
TRIGL SERPL-MCNC: 133 MG/DL
TSH SERPL DL<=0.05 MIU/L-ACNC: 2.01 UIU/ML (ref 0.36–3.74)
VENTRICULAR RATE: 135 BPM
VIT B12 SERPL-MCNC: 676 PG/ML (ref 100–900)
WBC # BLD AUTO: 9.07 THOUSAND/UL (ref 4.31–10.16)

## 2022-02-16 PROCEDURE — 99152 MOD SED SAME PHYS/QHP 5/>YRS: CPT | Performed by: INTERNAL MEDICINE

## 2022-02-16 PROCEDURE — NC001 PR NO CHARGE: Performed by: INTERNAL MEDICINE

## 2022-02-16 PROCEDURE — 82746 ASSAY OF FOLIC ACID SERUM: CPT | Performed by: STUDENT IN AN ORGANIZED HEALTH CARE EDUCATION/TRAINING PROGRAM

## 2022-02-16 PROCEDURE — C1894 INTRO/SHEATH, NON-LASER: HCPCS | Performed by: INTERNAL MEDICINE

## 2022-02-16 PROCEDURE — 93458 L HRT ARTERY/VENTRICLE ANGIO: CPT | Performed by: INTERNAL MEDICINE

## 2022-02-16 PROCEDURE — 93010 ELECTROCARDIOGRAM REPORT: CPT | Performed by: INTERNAL MEDICINE

## 2022-02-16 PROCEDURE — 93308 TTE F-UP OR LMTD: CPT

## 2022-02-16 PROCEDURE — 83540 ASSAY OF IRON: CPT | Performed by: STUDENT IN AN ORGANIZED HEALTH CARE EDUCATION/TRAINING PROGRAM

## 2022-02-16 PROCEDURE — 80048 BASIC METABOLIC PNL TOTAL CA: CPT | Performed by: STUDENT IN AN ORGANIZED HEALTH CARE EDUCATION/TRAINING PROGRAM

## 2022-02-16 PROCEDURE — 93325 DOPPLER ECHO COLOR FLOW MAPG: CPT | Performed by: INTERNAL MEDICINE

## 2022-02-16 PROCEDURE — B2151ZZ FLUOROSCOPY OF LEFT HEART USING LOW OSMOLAR CONTRAST: ICD-10-PCS | Performed by: INTERNAL MEDICINE

## 2022-02-16 PROCEDURE — 93321 DOPPLER ECHO F-UP/LMTD STD: CPT | Performed by: INTERNAL MEDICINE

## 2022-02-16 PROCEDURE — C1769 GUIDE WIRE: HCPCS | Performed by: INTERNAL MEDICINE

## 2022-02-16 PROCEDURE — 99233 SBSQ HOSP IP/OBS HIGH 50: CPT | Performed by: INTERNAL MEDICINE

## 2022-02-16 PROCEDURE — 85025 COMPLETE CBC W/AUTO DIFF WBC: CPT | Performed by: STUDENT IN AN ORGANIZED HEALTH CARE EDUCATION/TRAINING PROGRAM

## 2022-02-16 PROCEDURE — B2111ZZ FLUOROSCOPY OF MULTIPLE CORONARY ARTERIES USING LOW OSMOLAR CONTRAST: ICD-10-PCS | Performed by: INTERNAL MEDICINE

## 2022-02-16 PROCEDURE — 99222 1ST HOSP IP/OBS MODERATE 55: CPT | Performed by: THORACIC SURGERY (CARDIOTHORACIC VASCULAR SURGERY)

## 2022-02-16 PROCEDURE — 85730 THROMBOPLASTIN TIME PARTIAL: CPT | Performed by: INTERNAL MEDICINE

## 2022-02-16 PROCEDURE — 80061 LIPID PANEL: CPT | Performed by: STUDENT IN AN ORGANIZED HEALTH CARE EDUCATION/TRAINING PROGRAM

## 2022-02-16 PROCEDURE — 99223 1ST HOSP IP/OBS HIGH 75: CPT | Performed by: INTERNAL MEDICINE

## 2022-02-16 PROCEDURE — 93308 TTE F-UP OR LMTD: CPT | Performed by: INTERNAL MEDICINE

## 2022-02-16 PROCEDURE — 80053 COMPREHEN METABOLIC PANEL: CPT | Performed by: STUDENT IN AN ORGANIZED HEALTH CARE EDUCATION/TRAINING PROGRAM

## 2022-02-16 PROCEDURE — 82607 VITAMIN B-12: CPT | Performed by: STUDENT IN AN ORGANIZED HEALTH CARE EDUCATION/TRAINING PROGRAM

## 2022-02-16 PROCEDURE — 93454 CORONARY ARTERY ANGIO S&I: CPT | Performed by: INTERNAL MEDICINE

## 2022-02-16 PROCEDURE — 83550 IRON BINDING TEST: CPT | Performed by: STUDENT IN AN ORGANIZED HEALTH CARE EDUCATION/TRAINING PROGRAM

## 2022-02-16 PROCEDURE — 83735 ASSAY OF MAGNESIUM: CPT | Performed by: STUDENT IN AN ORGANIZED HEALTH CARE EDUCATION/TRAINING PROGRAM

## 2022-02-16 PROCEDURE — 99153 MOD SED SAME PHYS/QHP EA: CPT | Performed by: INTERNAL MEDICINE

## 2022-02-16 PROCEDURE — 84443 ASSAY THYROID STIM HORMONE: CPT | Performed by: STUDENT IN AN ORGANIZED HEALTH CARE EDUCATION/TRAINING PROGRAM

## 2022-02-16 PROCEDURE — 99222 1ST HOSP IP/OBS MODERATE 55: CPT | Performed by: INTERNAL MEDICINE

## 2022-02-16 PROCEDURE — 84100 ASSAY OF PHOSPHORUS: CPT | Performed by: STUDENT IN AN ORGANIZED HEALTH CARE EDUCATION/TRAINING PROGRAM

## 2022-02-16 PROCEDURE — 82728 ASSAY OF FERRITIN: CPT | Performed by: STUDENT IN AN ORGANIZED HEALTH CARE EDUCATION/TRAINING PROGRAM

## 2022-02-16 RX ORDER — POTASSIUM CHLORIDE 14.9 MG/ML
20 INJECTION INTRAVENOUS 2 TIMES DAILY
Status: DISCONTINUED | OUTPATIENT
Start: 2022-02-16 | End: 2022-02-16 | Stop reason: SDUPTHER

## 2022-02-16 RX ORDER — LIDOCAINE HYDROCHLORIDE 10 MG/ML
INJECTION, SOLUTION EPIDURAL; INFILTRATION; INTRACAUDAL; PERINEURAL AS NEEDED
Status: DISCONTINUED | OUTPATIENT
Start: 2022-02-16 | End: 2022-02-16 | Stop reason: HOSPADM

## 2022-02-16 RX ORDER — POTASSIUM CHLORIDE 20 MEQ/1
40 TABLET, EXTENDED RELEASE ORAL ONCE
Status: COMPLETED | OUTPATIENT
Start: 2022-02-17 | End: 2022-02-17

## 2022-02-16 RX ORDER — HEPARIN SODIUM 1000 [USP'U]/ML
INJECTION, SOLUTION INTRAVENOUS; SUBCUTANEOUS AS NEEDED
Status: DISCONTINUED | OUTPATIENT
Start: 2022-02-16 | End: 2022-02-16 | Stop reason: HOSPADM

## 2022-02-16 RX ORDER — MIDAZOLAM HYDROCHLORIDE 2 MG/2ML
INJECTION, SOLUTION INTRAMUSCULAR; INTRAVENOUS AS NEEDED
Status: DISCONTINUED | OUTPATIENT
Start: 2022-02-16 | End: 2022-02-16 | Stop reason: HOSPADM

## 2022-02-16 RX ORDER — VERAPAMIL HCL 2.5 MG/ML
AMPUL (ML) INTRAVENOUS AS NEEDED
Status: DISCONTINUED | OUTPATIENT
Start: 2022-02-16 | End: 2022-02-16 | Stop reason: HOSPADM

## 2022-02-16 RX ORDER — ALBUTEROL SULFATE 90 UG/1
2 AEROSOL, METERED RESPIRATORY (INHALATION) EVERY 4 HOURS PRN
Status: DISCONTINUED | OUTPATIENT
Start: 2022-02-16 | End: 2022-02-20 | Stop reason: HOSPADM

## 2022-02-16 RX ORDER — MAGNESIUM SULFATE HEPTAHYDRATE 40 MG/ML
2 INJECTION, SOLUTION INTRAVENOUS ONCE
Status: COMPLETED | OUTPATIENT
Start: 2022-02-16 | End: 2022-02-16

## 2022-02-16 RX ORDER — NITROGLYCERIN 20 MG/100ML
INJECTION INTRAVENOUS AS NEEDED
Status: DISCONTINUED | OUTPATIENT
Start: 2022-02-16 | End: 2022-02-16 | Stop reason: HOSPADM

## 2022-02-16 RX ORDER — POTASSIUM CHLORIDE 20 MEQ/1
40 TABLET, EXTENDED RELEASE ORAL ONCE
Status: COMPLETED | OUTPATIENT
Start: 2022-02-16 | End: 2022-02-16

## 2022-02-16 RX ORDER — CLOPIDOGREL BISULFATE 75 MG/1
TABLET ORAL AS NEEDED
Status: DISCONTINUED | OUTPATIENT
Start: 2022-02-16 | End: 2022-02-16 | Stop reason: HOSPADM

## 2022-02-16 RX ORDER — FENTANYL CITRATE 50 UG/ML
INJECTION, SOLUTION INTRAMUSCULAR; INTRAVENOUS AS NEEDED
Status: DISCONTINUED | OUTPATIENT
Start: 2022-02-16 | End: 2022-02-16 | Stop reason: HOSPADM

## 2022-02-16 RX ORDER — SODIUM CHLORIDE 9 MG/ML
50 INJECTION, SOLUTION INTRAVENOUS CONTINUOUS
Status: DISPENSED | OUTPATIENT
Start: 2022-02-16 | End: 2022-02-16

## 2022-02-16 RX ORDER — POTASSIUM CHLORIDE 14.9 MG/ML
20 INJECTION INTRAVENOUS 2 TIMES DAILY
Status: DISCONTINUED | OUTPATIENT
Start: 2022-02-17 | End: 2022-02-17

## 2022-02-16 RX ORDER — POTASSIUM CHLORIDE 14.9 MG/ML
20 INJECTION INTRAVENOUS ONCE
Status: COMPLETED | OUTPATIENT
Start: 2022-02-16 | End: 2022-02-17

## 2022-02-16 RX ADMIN — POTASSIUM CHLORIDE 20 MEQ: 14.9 INJECTION, SOLUTION INTRAVENOUS at 22:53

## 2022-02-16 RX ADMIN — HEPARIN SODIUM 16 UNITS/KG/HR: 10000 INJECTION, SOLUTION INTRAVENOUS; SUBCUTANEOUS at 01:08

## 2022-02-16 RX ADMIN — SODIUM CHLORIDE 50 ML/HR: 0.9 INJECTION, SOLUTION INTRAVENOUS at 16:36

## 2022-02-16 RX ADMIN — HEPARIN SODIUM 4000 UNITS: 1000 INJECTION INTRAVENOUS; SUBCUTANEOUS at 01:07

## 2022-02-16 RX ADMIN — Medication 12.5 MG: at 06:08

## 2022-02-16 RX ADMIN — HEPARIN SODIUM 4000 UNITS: 1000 INJECTION INTRAVENOUS; SUBCUTANEOUS at 13:53

## 2022-02-16 RX ADMIN — Medication 8 MG/HR: at 08:39

## 2022-02-16 RX ADMIN — ASPIRIN 81 MG: 81 TABLET, COATED ORAL at 08:42

## 2022-02-16 RX ADMIN — ATORVASTATIN CALCIUM 40 MG: 40 TABLET, FILM COATED ORAL at 18:07

## 2022-02-16 RX ADMIN — MAGNESIUM SULFATE IN WATER 2 G: 40 INJECTION, SOLUTION INTRAVENOUS at 08:34

## 2022-02-16 RX ADMIN — POTASSIUM CHLORIDE 40 MEQ: 1500 TABLET, EXTENDED RELEASE ORAL at 08:42

## 2022-02-16 RX ADMIN — Medication 12.5 MG: at 14:06

## 2022-02-16 RX ADMIN — Medication 12.5 MG: at 22:19

## 2022-02-16 RX ADMIN — POTASSIUM CHLORIDE 40 MEQ: 1500 TABLET, EXTENDED RELEASE ORAL at 08:41

## 2022-02-16 NOTE — DISCHARGE INSTR - OTHER ORDERS
Skin care plans:  1-Hydraguard to bilateral heels bid and prn   2-Elevate heels to offload pressure  3-Ehob cushion in chair when out of bed Patient has a gel cushion for his wheelchair   4-Moisturize skin daily with skin nourishing cream   5-Turn/reposition q2h or when medically stable for pressure re-distribution on skin

## 2022-02-16 NOTE — CASE MANAGEMENT
Case Management Assessment    Patient name Trang Hoffman  Location [address] 431/PPHP 369-18 MRN [id number]  : 1950 Date 2022       Current Admission Date: 2/15/2022  Current Admission Diagnosis:Elevated troponin   Patient Active Problem List    Diagnosis Date Noted    Acute on chronic respiratory failure with hypoxia ([facility] [address] ) 02/15/2022    Hemoptysis 02/15/2022    Type 2 diabetes mellitus ([facility] [address] ) 02/15/2022    Acute on chronic systolic congestive heart failure ([facility] [address] ) 2022    Neurogenic bladder 2022    Other constipation 2022    Transaminitis 2022    Elevated CPK 2022    Paraplegia ([facility] [address] ) 2022    Pneumonia due to COVID-19 virus 2022    [facility]    Skin ulcer of toe of right foot with fat layer exposed ([facility] [address] ) 2022    PAD (peripheral artery disease) ([facility] [address] ) 2022    Acute respiratory failure with hypoxia ([facility] [address] ) 2022    Elevated troponin 2022      LOS (days): 1  Geometric Mean LOS (GMLOS) (days): 3 80  Days to GMLOS:3     OBJECTIVE:  PATIENT READMITTED TO HOSPITAL  Risk of Unplanned Readmission Score: 16      Current admission status: Inpatient    Preferred Pharmacy:   [facility], [facility],[address] [address]  Phone: [phone] Fax: [phone]    Primary Care Provider: No primary care provider on file      Primary Insurance: MEDICARE  Secondary Insurance:     ASSESSMENT:  [address]  Representative - Spouse   Primary Phone: [phone] (Mobile)               Advance Directives  Does patient have a [address]?: No  Does patient have Advance Directives?: No  Primary Contact: Pt's wife Emily [place] / phone: [phone]    Patient Information  Admitted from[de-identified] Other (comment) (Transferred to [facility] on 2/15/22 from [facility] where pt was originally admitted on 2/14/22 )  Mental Status: Alert  Assessment information provided by[de-identified] Patient  Primary Caregiver: [address] [facility] of Residence: Other (specify in comment box) [facility])  What city do you live in?: [place], [address] entry access options  Select all that apply : No steps to enter home  Type of Current Residence: 2 story home  Upon entering residence, is there a bedroom on the main floor (no further steps)?: No  A bedroom is located on the following floor levels of residence (select all that apply):: 2nd Floor  Upon entering residence, is there a bathroom on the main floor (no further steps)?: Yes  Number of steps to 2nd floor from main floor: One Flight  In the last 12 months, was there a time when you were not able to pay the mortgage or rent on time?: No  In the last 12 months, how many places have you lived?: 1  In the last 12 months, was there a time when you did not have a steady place to sleep or slept in a shelter (including now)?: No  Homeless/housing insecurity resource given?: N/A  Living Arrangements: Lives w/ Spouse/significant other    Activities of Daily Living Prior to Admission  Functional Status: Independent  Completes ADLs independently?: Yes  Ambulates independently?: Yes  Does patient use assisted devices?: Yes  Assisted Devices (DME) used:  Wheelchair,Stair Chair/Glide  Does patient currently own DME?: Yes  What DME does the patient currently own?: Eliseo Malachi Chair/Glide  Does patient have a history of Outpatient Therapy (PT/OT)?: No  Does the patient have a history of Short-Term Rehab?: No  Does patient have a history of HHC?: No  Does patient currently have [facility]?: No    Patient Information Continued  Income Source: Pension/jail  Does patient have prescription coverage?: Yes  Within the past 12 months, you worried that your food would run out before you got the money to buy more : Never true  Within the past 12 months, the food you bought just didnt last and you didnt have money to get more : Never true  Food insecurity resource given?: N/A  Does patient receive dialysis treatments?: No  Does patient have a history of substance abuse?: No  Does patient have a history of Mental Health Diagnosis?: No    Means of Transportation  Means of Transport to Appts[de-identified] Family transport  In the past 12 months, has lack of transportation kept you from medical appointments or from getting medications?: No  In the past 12 months, has lack of transportation kept you from meetings, work, or from getting things needed for daily living?: No  Was application for public transport provided?: N/A     CM reviewed d/c planning process including the following: identifying help at home, patient preference for d/c planning needs, Discharge Lounge, [facility] Meds to Bed program, availability of treatment team to discuss questions or concerns patient and/or family may have regarding understanding medications and recognizing signs and symptoms once discharged  CM also encouraged patient to follow up with all recommended appointments after discharge  Patient advised of importance for patient and family to participate in managing patients medical well being

## 2022-02-16 NOTE — PROGRESS NOTES
INTERNAL MEDICINE RESIDENCY PROGRESS NOTE     Name: Maikel Jones   Age & Sex: 81 y o  male   MRN: [id number]  Unit/Bed#: [facility] 431-01   Encounter: [id number]  Team: SOD Team B     PATIENT INFORMATION     Name: Maikel Jones   Age & Sex: 81 y o  male   MRN: [id number]  Hospital Stay Days: 1    ASSESSMENT/PLAN     Principal Problem:    Elevated troponin  Active Problems:    Paraplegia ([address] )    Acute on chronic systolic congestive heart failure (HCC)    Acute on chronic respiratory failure with hypoxia ([address] )    Hemoptysis    Type 2 diabetes mellitus ([address] )      Hemoptysis  Assessment & Plan  Has been occurring for the last month, likely associated with COVID-19 pneumonia  Plan  - Daily CBC  - Continue diuresis as above  - Wean supplemental O2 as tolerated    Acute on chronic respiratory failure with hypoxia (HCC)  Assessment & Plan  See A/P for acute on chronic systolic congestive heart failure  Had recent COVID infection and was weaned off [place] O2 before worsening over again in context of the heart failure  Acute on chronic systolic congestive heart failure [facility])  Assessment & Plan  Wt Readings from Last 3 Encounters:   02/16/22 83 kg (183 lb)   02/15/22 83 2 kg (183 lb 6 8 oz)   01/12/22 82 7 kg (182 lb 5 1 oz)     Patient presents with worsening chest pain and dyspnea  BNP 1400  CTA PE showed no PE, bilateral groundglass airspace disease in a mostly perihilar distribution likely secondary to CHF (although component of residual COVID pneumonia not excluded), moderate bilateral pleural effusions  Developed hypotension after diuresis prompting slower diuresis with lasix gtt and BP monitoring  Has been hemodynamically stable, so transfer to [facility] for potential cath      Plan  - Pulm consulted regarding bilateral pulmonary effusions, they determined it is not necessary to perform thoracentesis   - Continue Lasix gtt  - Monitor vital signs  - Metoprolol, lisinopril  - Daily weights  - Strict I/O  - Wean supplemental O2 as tolerated  - Fluid restriction 1800 cc and 2g sodium diet   - See "Elevated Troponin" for further plan    Paraplegia [facility])  Assessment & Plan  Baseline paraplegia  - No intervention    * Elevated troponin  Assessment & Plan  On prior admission (22), patient had initial hsTrop elevation to 85, which was deemed non-MI troponin elevation in setting of normal ECG  Developed new chest pain later that admission with new hsTrop elevation to >1600  ECG did not show acute ST changes, so he was sent home on ASA, plavix, statin, BB therapy  Patient was initially discharged with plan for outpatient nuclear stress test     Readmitted this time for chest pain and worsening shortness of breath, likely CHF exacerbation  HsTrop on admission 872 - peak 5732 - 4650  Associated with recent new reduction in LVEF  Transferred to [facility] for potential cardiac cath  Plan  - Cardiology consulted, appreciate recs  - Patient improving today   - Will repeat echo today to assess heart function  - Will not perform cath today, will plan for outpatient cath   - Continue ASA, plavix, high intensity statin, low-dose BB  - Continue telemetry  - Monitor vital signs        Disposition: Continue inpatient management, cardiology and pulmonology following, appreciate recs     SUBJECTIVE     Patient seen and examined  No acute events overnight  Denies any chest pain, SOB, hemoptysis, nausea or vomiting, fevers or chills this morning       OBJECTIVE     Vitals:    22 0300 22 0608 22 0703 22 1106   BP: 108/71 107/71 105/67 105/67   BP Location:   Left arm    Pulse: (!) 113 (!) 120 104 (!) 107   Resp: 20  18    Temp: 98 6 °F (37 °C)  98 6 °F (37 °C)    TempSrc:   Oral    SpO2: 95%  96%    Weight:    83 kg (183 lb)   Height:    5' 4" (1 626 m)      Temperature:   Temp (24hrs), Av 6 °F (37 °C), Min:98 6 °F (37 °C), Max:98 6 °F (37 °C)    Temperature: 98 6 °F (37 °C)  Intake & Output:  I/O        0701  02/15 0700 02/15 0701 02/16 0700 02/16 0701  02/17 0700    P  O   120     I V   107     Total Intake  227     Urine  2375     Total Output  2375     Net  -2148                Weights:   IBW (Ideal Body Weight): 59 2 kg    Body mass index is 31 41 kg/m²  Weight (last 2 days)     Date/Time Weight    02/16/22 1106 83 (183)        Physical Exam  Vitals and nursing note reviewed  Constitutional:       General: He is not in acute distress  Appearance: He is well-developed  HENT:      Head: Normocephalic and atraumatic  Eyes:      Conjunctiva/sclera: Conjunctivae normal    Cardiovascular:      Rate and Rhythm: Regular rhythm  Tachycardia present  Pulses: Normal pulses  Heart sounds: Normal heart sounds  No murmur heard  Pulmonary:      Effort: Pulmonary effort is normal  No respiratory distress  Breath sounds: Rales (Bilateral ) present  Abdominal:      Palpations: Abdomen is soft  Tenderness: There is no abdominal tenderness  Musculoskeletal:      Cervical back: Neck supple  Right lower leg: Edema (2+) present  Left lower leg: Edema (2+) present  Skin:     General: Skin is warm and dry  Neurological:      Mental Status: He is alert  LABORATORY DATA     Labs: I have personally reviewed pertinent reports    Results from last 7 days   Lab Units 02/16/22  0609 02/15/22  0530 02/14/22  2242   WBC Thousand/uL 9 07 11 75* 8 56   HEMOGLOBIN g/dL 10 1* 11 0* 12 0   HEMATOCRIT % 31 4* 34 3* 37 4   PLATELETS Thousands/uL 253 281  281 285   NEUTROS PCT % 63 76* 90*   MONOS PCT % 11 10 4      Results from last 7 days   Lab Units 02/16/22  0609 02/14/22  2243   POTASSIUM mmol/L 2 9* 4 0   CHLORIDE mmol/L 103 106   CO2 mmol/L 26 23   BUN mg/dL 16 21   CREATININE mg/dL 0 70 0 85   CALCIUM mg/dL 8 0* 8 5   ALK PHOS U/L 65 87   ALT U/L 34 44   AST U/L 59* 50*     Results from last 7 days   Lab Units 02/16/22  0609   MAGNESIUM mg/dL 1 9     Results from last 7 days   Lab Units 02/16/22  0609   PHOSPHORUS mg/dL 3 8      Results from last 7 days   Lab Units 02/16/22  0932 02/16/22  0024 02/15/22  2019 02/15/22  1228 02/15/22  0534 02/14/22  2244 02/14/22  2244   INR   --   --   --   --  1 12  --  1 16   PTT seconds 38* 41* 47*   < > 37   < > 39*    < > = values in this interval not displayed  Results from last 7 days   Lab Units 02/15/22  0133   LACTIC ACID mmol/L 2 0           IMAGING & DIAGNOSTIC TESTING     Radiology Results: I have personally reviewed pertinent reports  CTA ED chest PE study    Result Date: 2/15/2022  Impression: No evidence of pulmonary embolus  Bilateral groundglass airspace disease in a mostly perihilar distribution likely secondary to CHF although component of residual COVID pneumonia not excluded  Moderate bilateral pleural effusions Workstation performed: [id number]     Other Diagnostic Testing: I have personally reviewed pertinent reports      ACTIVE MEDICATIONS     Current Facility-Administered Medications   Medication Dose Route Frequency    acetaminophen (TYLENOL) tablet 650 mg  650 mg Oral Q6H PRN    aspirin (ECOTRIN LOW STRENGTH) EC tablet 81 mg  81 mg Oral Daily    atorvastatin (LIPITOR) tablet 40 mg  40 mg Oral Daily With Dinner    clopidogrel (PLAVIX) tablet 75 mg  75 mg Oral Daily    docusate sodium (COLACE) capsule 100 mg  100 mg Oral BID    furosemide (LASIX) 500 mg infusion 50 mL  8 mg/hr Intravenous Continuous    heparin (porcine) 25,000 units in 0 45% in sodium chloride 250 ml infusion (CMPD)  3-20 Units/kg/hr (Order-Specific) Intravenous Titrated    heparin (porcine) injection 2,000 Units  2,000 Units Intravenous Q1H PRN    heparin (porcine) injection 4,000 Units  4,000 Units Intravenous Q1H PRN    metoprolol tartrate (LOPRESSOR) partial tablet 12 5 mg  12 5 mg Oral Q8H [facility]    polyethylene glycol (MIRALAX) packet 17 g  17 g Oral Daily PRN    senna (SENOKOT) tablet 25 8 mg  3 tablet Oral BID PRN       VTE Pharmacologic Prophylaxis: Heparin  VTE Mechanical Prophylaxis: sequential compression device    Portions of the record may have been created with voice recognition software  Occasional wrong word or "sound a like" substitutions may have occurred due to the inherent limitations of voice recognition software    Read the chart carefully and recognize, using context, where substitutions have occurred   ==  Jordy [address]  Internal Medicine MS-4

## 2022-02-16 NOTE — CONSULTS
PULMONOLOGY CONSULT NOTE     Name: William Rodriguez   Age & Sex: 81 y o  male   MRN: [id number]  Unit/Bed#: [facility] 431-01   Encounter: [id number]        Reason for consultation:  Hypoxic respiratory failure    Requesting physician: Kelsi Frank DO    Assessment:     1  Acute on chronic hypoxic respiratory failure   -2 L nasal cannula baseline post discharge from recent [facility] hospitalization  -secondary to acute on chronic CHF/volume overload, bilateral pleural effusion  consider resolving sequelae of COVID  Doubt bacterial pneumonia    2  Acute on chronic systolic CHF  -the onset cardiomyopathy as of January 2022  -elevated high sensitivity troponin    3  Bilateral pleural effusion  -secondary to heart failure exacerbation    4  Type 2 myocardial infarction  -suspect in setting of demand due to CHF exacerbation  -elevated high sensitivity troponin    5  Emphysema/COPD - no PFTs on file - not in exacerbation  -emphysematous changes noted on imaging    6  Hemoptysis - resolved  -reported prior to presentation at [address]    7  Recent COVID-19 pneumonia  -on vaccination  -hospitalization in January 2020 to  -CT imaging shows ground-glass opacities with lymphadenopathy which may be secondary to resolving COVID-19 inflammation/infection      Plan:    -continue supplemental oxygen and titrate as tolerated; goal SpO2 >88%  -cardiology consulted and following; IV diuretics per Cardiology and primary  On Lasix GTT  -planning for left heart catheterization per Cardiology  -cardiac diet and fluid restriction  -no plans for thoracentesis at this moment; will evaluate for thoracentesis if pleural effusions do not resolve with continued diuretics  May resume anticoagulation  -no plans for bronchoscopy or steroids at this moment; hemoptysis resolved and not in COPD/emphysema exacerbation    Also denies excessive sputum production  -patient afebrile without leukocytosis and procalcitonin negative; monitor off antibiotics  -add albuterol p r n   -out of bed as tolerated, incentive spirometry      History of Present Illness   HPI:  Verito Maynard is a 81 y o  male with PVD, tobacco use, paraplegia, neurogenic bladder, recent COVID-19 infection January 2022 requiring hospitalization with newly diagnosed cardiomyopathy (ejection fraction 40%) presented to [address] [place] [address] for worsening shortness of breath, orthopnea, cough with intermittent hemoptysis  On imaging he had bilateral pleural effusion, ground-glass opacities without pulmonary embolism  He had elevated BNP and elevated high sensitivity troponin  His started on IV diuretics  Pulmonology was consulted at [address] [place] [address] and recommended to continue IV diuresis  Cardiology was also consulted and recommended cardiac catheterization  Patient was subsequently transferred to [address] [place] [address] for cardiac catheterization  Today the patient states that he is significantly better with diuresis  He states that his bilateral lower extremity swelling has gone down significantly  Also currently not reporting any further episodes of hemoptysis  He does have chronic swelling secondary to his lymphedema  Pulmonology can reconsulted for continuity of care  Of note patient hospitalized between 1/5/22 to 1/12/22 for COVID-19  At that time his TTE showed decreased new onset systolic heart failure  He was eventually discharged on 2 L nasal cannula oxygen  Review of systems:  12 point review of systems was completed and was otherwise negative except as listed in HPI        Historical Information   Past Medical History:   Diagnosis Date    CHF (congestive heart failure) ([address] )     COVID     Paraplegia (HCC)     x30 years     Past Surgical History:   Procedure Laterality Date    BACK SURGERY       Family History   Problem Relation Age of Onset    Cancer Mother     Stroke Mother     Cancer Father     Alcohol abuse Father Occupational History:  Noncontributory  Social History:  Never smoker    Meds/Allergies   Current Facility-Administered Medications   Medication Dose Route Frequency    acetaminophen (TYLENOL) tablet 650 mg  650 mg Oral Q6H PRN    albuterol (PROVENTIL HFA,VENTOLIN HFA) inhaler 2 puff  2 puff Inhalation Q4H PRN    aspirin (ECOTRIN LOW STRENGTH) EC tablet 81 mg  81 mg Oral Daily    atorvastatin (LIPITOR) tablet 40 mg  40 mg Oral Daily With Dinner    clopidogrel (PLAVIX) tablet 75 mg  75 mg Oral Daily    docusate sodium (COLACE) capsule 100 mg  100 mg Oral BID    furosemide (LASIX) 500 mg infusion 50 mL  8 mg/hr Intravenous Continuous    heparin (porcine) 25,000 units in 0 45% in sodium chloride 250 ml infusion (CMPD)  3-20 Units/kg/hr (Order-Specific) Intravenous Titrated    heparin (porcine) injection 2,000 Units  2,000 Units Intravenous Q1H PRN    heparin (porcine) injection 4,000 Units  4,000 Units Intravenous Q1H PRN    metoprolol tartrate (LOPRESSOR) partial tablet 12 5 mg  12 5 mg Oral Q8H [facility]    polyethylene glycol (MIRALAX) packet 17 g  17 g Oral Daily PRN    senna (SENOKOT) tablet 25 8 mg  3 tablet Oral BID PRN     Medications Prior to Admission   Medication    aspirin (ECOTRIN LOW STRENGTH) 81 mg EC tablet    clopidogrel (PLAVIX) 75 mg tablet    cyanocobalamin (VITAMIN B-12) 100 MCG tablet    metoprolol tartrate (LOPRESSOR) 25 mg tablet    nitroglycerin (NITROSTAT) 0 4 mg SL tablet    predniSONE 20 mg tablet     No Known Allergies    Vitals: Blood pressure 105/67, pulse (!) 107, temperature 98 6 °F (37 °C), temperature source Oral, resp  rate 18, height 5' 4" (1 626 m), weight 83 kg (183 lb), SpO2 96 %  , Body mass index is 31 41 kg/m²  Intake/Output Summary (Last 24 hours) at 2/16/2022 1242  Last data filed at 2/16/2022 1211  Gross per 24 hour   Intake 346 97 ml   Output 2375 ml   Net -2028 03 ml       Physical Exam  Vitals and nursing note reviewed     Constitutional: General: He is not in acute distress  Appearance: He is obese  He is not ill-appearing, toxic-appearing or diaphoretic  HENT:      Head: Normocephalic and atraumatic  Right Ear: External ear normal       Left Ear: External ear normal       Nose: Nose normal    Eyes:      General: No scleral icterus  Right eye: No discharge  Left eye: No discharge  Extraocular Movements: Extraocular movements intact  Conjunctiva/sclera: Conjunctivae normal    Cardiovascular:      Rate and Rhythm: Regular rhythm  Tachycardia present  Pulses: Normal pulses  Heart sounds: Murmur heard  No friction rub  No gallop  Pulmonary:      Effort: No respiratory distress  Breath sounds: No stridor  No wheezing, rhonchi or rales  Comments: Diminished breath sounds at bilateral bases  Mildly increased work of breathing  Chest:      Chest wall: No tenderness  Abdominal:      General: Bowel sounds are normal  There is no distension  Palpations: Abdomen is soft  Tenderness: There is no abdominal tenderness  There is no guarding or rebound  Musculoskeletal:      Right lower leg: Edema present  Left lower leg: Edema present  Comments: +2 pitting edema bilateral lower extremities   Skin:     General: Skin is warm and dry  Comments: Chronic venous stasis changes   Neurological:      Mental Status: He is alert and oriented to person, place, and time  Labs: I have personally reviewed pertinent lab results    Laboratory and Diagnostics  Results from last 7 days   Lab Units 02/16/22  0609 02/15/22  0530 02/14/22  2242   WBC Thousand/uL 9 07 11 75* 8 56   HEMOGLOBIN g/dL 10 1* 11 0* 12 0   HEMATOCRIT % 31 4* 34 3* 37 4   PLATELETS Thousands/uL 253 281  281 285   NEUTROS PCT % 63 76* 90*   MONOS PCT % 11 10 4     Results from last 7 days   Lab Units 02/16/22  0609 02/14/22  2243   SODIUM mmol/L 138 140   POTASSIUM mmol/L 2 9* 4 0   CHLORIDE mmol/L 103 106   CO2 mmol/L 26 23   ANION GAP mmol/L 9 11   BUN mg/dL 16 21   CREATININE mg/dL 0 70 0 85   CALCIUM mg/dL 8 0* 8 5   GLUCOSE RANDOM mg/dL 118 224*   ALT U/L 34 44   AST U/L 59* 50*   ALK PHOS U/L 65 87   ALBUMIN g/dL 2 9* 3 3*   TOTAL BILIRUBIN mg/dL 0 88 0 65     Results from last 7 days   Lab Units 02/16/22  0609   MAGNESIUM mg/dL 1 9   PHOSPHORUS mg/dL 3 8      Results from last 7 days   Lab Units 02/16/22  0932 02/16/22  0024 02/15/22  2019 02/15/22  1228 02/15/22  0534 02/14/22  2244   INR   --   --   --   --  1 12 1 16   PTT seconds 38* 41* 47* 57* 37 39*          Results from last 7 days   Lab Units 02/15/22  0133 02/14/22  2242   LACTIC ACID mmol/L 2 0 2 4*     Results from last 7 days   Lab Units 02/16/22  0609 02/14/22  2243   FERRITIN ng/mL 114  --    CRP mg/L  --  71 8*             Results from last 7 days   Lab Units 02/14/22  2244   D-DIMER QUANTITATIVE ug/ml FEU 1 33*     Results from last 7 days   Lab Units 02/15/22  0530 02/14/22  2243   PROCALCITONIN ng/ml 0 05 <0 05           Imaging and other studies: I have personally reviewed pertinent reports  and I have personally reviewed pertinent films in PACS    CTA pulmonary embolism study 2/15/22:  No pulmonary embolism  Symmetric bilateral ground-glass opacities more prominent hilar regions  Interlobular septal thickening with background emphysematous changes  Moderate size bilateral pleural effusions  Pulmonary function testing: n/a    EKG, Pathology, and Other Studies: I have personally reviewed pertinent reports  and I have personally reviewed pertinent films in PACS    Echocardiogram 1/10/22:  Ejection fraction 40% with moderate global hypokinesis  Estimated peak PA pressure 50 mmHg  Left atrium mildly dilated  Moderate mitral valve regurgitation  Mild tricuspid valve regurgitation  Code Status: Level 3 - DNAR and DNI        Portions of the record may have been created with voice recognition software    Occasional wrong word or "sound a like" substitutions may have occurred due to the inherent limitations of voice recognition software  Read the chart carefully and recognize, using context, where substitutions have occurred      DO [facility]  Pulmonary/Critical Care Fellowship, PGY-5

## 2022-02-16 NOTE — PROGRESS NOTES
Care transferred to [place] [address] at this time  Right Wrist site assessed at the bedside  Site stable, no bleeding or swelling  Patient aware of their bed rest and restrictions  The patient is alert and oriented, denies pain at the moment

## 2022-02-16 NOTE — WOUND OSTOMY CARE
Consult Note - Wound   Angela Baker 81 y o  male MRN: [id number]  Unit/Bed#: [facility] CATH LAB ROOM Encounter: [id number]      History and Present Illness: Patient is seen for wound care consult today   The patient is a 81year old male that is a paraplegic for 30 years   He has a gel cushion for his wheelchair and is wheelchair bound   He does not have a low air loss mattress and does not want a low air loss mattress while in the hospital   He has a palacio in place for management of bladder   He is independent for turning in the bed   Assessment Findings:   1  Bilateral heels dry and intact with edema and dry skin   2  Right buttocks area with old scar intact and new epithelization of the area - Patient is not wanting a protective cream ordered   3  Patient has a gel cushion for on his chair and does not want a low air loss mattress   Discussed the plan with the patient and agreed with the plan   Skin care plans:  1-Hydraguard to bilateral heels bid and prn   2-Elevate heels to offload pressure  3-Ehob cushion in chair when out of bed Patient has a gel cushion for his wheelchair   4-Moisturize skin daily with skin nourishing cream   5-Turn/reposition q2h or when medically stable for pressure re-distribution on skin  Vitals: Blood pressure 103/69, pulse (!) 122, temperature 99 °F (37 2 °C), temperature source Oral, resp  rate 18, height 5' 4" (1 626 m), weight 83 kg (183 lb), SpO2 98 %  ,Body mass index is 31 41 kg/m²  Wound 01/07/22 Thigh Posterior;Proximal;Right (Active)   Wound Image   02/16/22 1306   Wound Description Clean;Dry; Intact 02/16/22 1306   Anaya-wound Assessment Clean;Dry; Intact 02/16/22 1306   Wound Length (cm) 0 cm 02/16/22 1306   Wound Width (cm) 0 cm 02/16/22 1306   Wound Surface Area (cm^2) 0 cm^2 02/16/22 1306   Drainage Amount None 02/16/22 1306   Treatments Cleansed;Site care 02/16/22 1306   Dressing Open to air 02/16/22 1306   Patient Tolerance Tolerated well 02/16/22 1306       Wound care will sign off call or tiger text with questions or concerns     Margot Mathis RN BSN CWOCN

## 2022-02-16 NOTE — PROGRESS NOTES
Cardiology Team 2 Progress Note - Alva Armando 81 y o  male MRN: [id number]    Unit/Bed#: [facility] 431-01 Encounter: [id number]    Reason for consult: Cardiomyopathy     Subjective:   Patient seen and examined  No significant events overnight  Patient reports dyspnea on exertion, even just moving around in bed  States his lower extremity swelling has improved since yesterday  Denies lightheadedness, dizziness, syncope, headache, vision changes, diaphoresis, chest pain, palpitations, nausea, vomiting, abdominal pain  Hospital Course:   Alva Armando is a 81y o  year old male with a history of PVD, prior tobacco abuse, paraplegia, neurogenic bladder, recent COVID infection one month prior requiring hospitalization with newly diagnosed cardiomyopathy- EF 40%  Patient was recently admitted 1/5 - 1/12 at [facility] for [facility]  TTE performed 1/10 showed an EF 40% with moderate global hypokinesis, mildly dilated left atrium, moderate MR, mild TR, PA pressure 50  Patient was discharged home on 2L NC     1 week ago prior to presentation, patient started having worsening SOB with orthopnea, cough w/ intermittent hemoptysis  His supplemental oxygen requirements increased to 5L  Patient saw his pulmonologist 2/11 via telemedicine- CXR obtained which showed persistent findings related to COVID-19  Patient was prescribed a short course of steroids prior to coming to the ER due to worsening of symptoms  Patient was started on heparin gtt and was transferred to [facility] for ischemic evaluation       Workup:  - BNP elevated at 1468, increased from prior (373)  - 2/15 CTA showed moderate bilateral pleural effusions  - 2/15 HS troponins 872 --> 5732  - 2/14 EKG: New left axis deviation, Q waves in inferior leads seen on prior EKG 1/9    Vitals: Blood pressure 105/67, pulse 104, temperature 98 6 °F (37 °C), temperature source Oral, resp  rate 18, SpO2 96 %  , There is no height or weight on file to calculate BMI ,   Orthostatic Blood Pressures      Most Recent Value   Blood Pressure 105/67 filed at 02/16/2022 0703   Patient Position - Orthostatic VS Lying filed at 02/16/2022 0703            Intake/Output Summary (Last 24 hours) at 2/16/2022 1020  Last data filed at 2/16/2022 0400  Gross per 24 hour   Intake 226 97 ml   Output 2375 ml   Net -2148 03 ml         Physical Exam:  GEN: Patrici Offer alert and oriented x 3, pleasant and cooperative   HEENT:  Normocephalic, atraumatic, anicteric, moist mucous membranes  NECK: JVD 3 cm above clavicle at 45 degrees  HEART: Tachycardic, Regular rhythm, normal S1 and S2, no murmurs, clicks, gallops or rubs   LUNGS: Decreased breath sounds bibasilar; no wheezes, rales, or rhonchi; respiration nonlabored   ABDOMEN:  Normoactive bowel sounds, soft, no tenderness, no distention  EXTREMITIES: peripheral pulses palpable; no edema  NEURO: no gross focal findings; cranial nerves grossly intact   SKIN:  Dry, intact, warm to touch      Current Facility-Administered Medications:     acetaminophen (TYLENOL) tablet 650 mg, 650 mg, Oral, Q6H PRN, Arnol Chen MD    aspirin (ECOTRIN LOW STRENGTH) EC tablet 81 mg, 81 mg, Oral, Daily, Arnol Chen MD, 81 mg at 02/16/22 2564    atorvastatin (LIPITOR) tablet 40 mg, 40 mg, Oral, Daily With Chad Valenzuela MD    clopidogrel (PLAVIX) tablet 75 mg, 75 mg, Oral, Daily, Arnol Chen MD    docusate sodium (COLACE) capsule 100 mg, 100 mg, Oral, BID, Arnol Chen MD    furosemide (LASIX) 500 mg infusion 50 mL, 8 mg/hr, Intravenous, Continuous, Arnol Chen MD, Last Rate: 0 8 mL/hr at 02/16/22 0839, 8 mg/hr at 02/16/22 0839    heparin (porcine) 25,000 units in 0 45% in sodium chloride 250 ml infusion (CMPD), 3-20 Units/kg/hr (Order-Specific), Intravenous, Titrated, Arnol Chen MD, Last Rate: 12 8 mL/hr at 02/16/22 0108, 16 Units/kg/hr at 02/16/22 0108    heparin (porcine) injection 2,000 Units, 2,000 Units, Intravenous, Q1H PRN, Kathy Coronado MD    heparin (porcine) injection 4,000 Units, 4,000 Units, Intravenous, Q1H PRN, Kathy Coronado MD, 4,000 Units at 02/16/22 0107    magnesium sulfate 2 g/50 mL IVPB (premix) 2 g, 2 g, Intravenous, Once, Louisa Carbajal DO, 2 g at 02/16/22 0834    metoprolol tartrate (LOPRESSOR) partial tablet 12 5 mg, 12 5 mg, Oral, Q8H [address], Kathy Coronado MD, 12 5 mg at 02/16/22 0608    polyethylene glycol (MIRALAX) packet 17 g, 17 g, Oral, Daily PRN, Kathy Coronado MD    [facility]) tablet 25 8 mg, 3 tablet, Oral, BID PRN, Kathy Coronado MD    Labs & Results:      Results from last 7 days   Lab Units 02/14/22  2243   NT-PRO BNP pg/mL 1,468*     Results from last 7 days   Lab Units 02/16/22  0609 02/14/22  2243   POTASSIUM mmol/L 2 9* 4 0   CO2 mmol/L 26 23   CHLORIDE mmol/L 103 106   BUN mg/dL 16 21   CREATININE mg/dL 0 70 0 85     Results from last 7 days   Lab Units 02/16/22  0609 02/15/22  0530 02/14/22  2242   HEMOGLOBIN g/dL 10 1* 11 0* 12 0   HEMATOCRIT % 31 4* 34 3* 37 4   PLATELETS Thousands/uL 253 281  281 285     Results from last 7 days   Lab Units 02/16/22  0609   TRIGLYCERIDES mg/dL 133   HDL mg/dL 28*   LDL CALC mg/dL 135*       Telemetry:   Personally reviewed by Betty Phoenix DO    Imaging: Reviewed, see above    Assessment:  Principal Problem:    Elevated troponin  Active Problems:    Paraplegia ([facility] [address] )    Acute on chronic systolic congestive heart failure (HCC)    Acute on chronic respiratory failure with hypoxia (HCC)    Hemoptysis    Type 2 diabetes mellitus ([facility] [address] )      Plan:    #Cardiomyopathy- Recent hospitalization for COVID 19 w/ TTE showing EF 40%  Now presents with worsening SOB, dyspnea on exertion, orthopnea  Etiology is likely ischemic vs viral in the setting of recent COVID-19 infection     - f/u TTE  - angiography to r/o ischemic etiology       #Heart failure with reduced ejection fraction- Last TTE 1/10 showed EF 40% with global hypokinesis  No prior cardiac history or diagnostic imaging  Now presents with worsening SOB, lower extremity edema, elevated BNP compared to prior and bilateral pleural effusions  - c/w Lasix gtt  @ 8mh/hr  - consider increasing Lopressor to 50 q8h   - strict I/O  - daily weights       #Type 2 MI- Initial troponin elevation 872 that peaked at 5732  EKG showed new left axis deviation with known Q waves in inferior leads seen on prior EKG  Suspect troponin elevation is secondary to supply-demand mismatch in the setting of acute on chronic heart failure  -TTE  -c/w heparin gtt , ASA, Plavix, statin, beta blocker       #Bilateral pleural effusions- likely secondary to acute heart failure exacerbation  Currently improving with IV diuresis  - management per pulmonology  - c/w diuresis       #Acute hypoxic respiratory failure- discharged recently on 2L after COVID-19 infection  Presented with increasing oxygen requirements, likely multifactorial with heart failure exacerbation and bilateral pleural effusions in the setting of recent COVID infection  - management per pulmonology  - suspect oxygen requirements will decrease with continued diuresis       Case discussed and reviewed with Dr Devora Gonzalez who agrees with my assessment and plan  Thank you for involving us in the care of your patient  Casper De León DO PGY-1  [facility]      [employer]/ [employer]/Care Everywhere records reviewed    ** Please Note: Fluency DirectDictation voice to text software may have been used in the creation of this document   **

## 2022-02-16 NOTE — CONSULTS
Consultation - Cardiothoracic Surgery   Jessica Brennan 81 y o  male MRN: [id number]  Unit/Bed#: [facility] 431-01 Encounter: [id number]    Physician Requesting Consult: Denton Monteiro MD    Reason for Consult / Principal Problem: CAD    Inpatient consult to Cardiothoracic Surgery  Consult performed by: Alin Elkins PA-C  Consult ordered by: Angela Clayton DO        History of Present Illness: Jessica Brennan is a 81y o  year old male without a longstanding cardiovascular past medical history  His current clinical course began in January of this year  At that time he was hospitalized with severe COVID infection  He was ultimately discharged to home with supplemental oxygen therapy  As he convalesced and recovered from this event, he noted improvement in his breathing  However approximately 4 days ago he had an acute recurrence of exertional dyspnea, fatigability, and onset of substernal chest pressure  He presented for evaluation and treatment at [address] [facility] for evaluation treatment  Troponins were sent and found to be elevated  Repeat echocardiogram was completed and progression of previously identified cardiomyopathy was found  Ejection fraction of 20% from previous of 40% 1 month ago  CTA imaging was also completed to rule out pulmonary embolism  No thrombus was identified, but he was found to have significant right-sided fusion and bilateral ground-glass airspace disease consistent with COVID infection  In light of troponin elevation, cardiac catheterization was completed  Severe coronary artery disease was identified  At this point is transferred to [facility] for cardiac surgery consultation  During admission today he notes improvement of his symptoms with initiation of diuretic therapy    He denies recurrence of angina since admission to the hospital       His past medical history is otherwise significant for paraplegia following a accident many years ago, peripheral arterial disease, prior tobacco use  Past Medical History:  Past Medical History:   Diagnosis Date    CHF (congestive heart failure) ([facility] [address] )     COVID     Paraplegia (HCC)     x30 years         Past Surgical History:   Past Surgical History:   Procedure Laterality Date    BACK SURGERY           Family History:  Family History   Problem Relation Age of Onset    Cancer Mother     Stroke Mother     Cancer Father     Alcohol abuse Father          Social History:  Social History     Substance and Sexual Activity   Alcohol Use Never    Alcohol/week: 0 0 standard drinks     Social History     Substance and Sexual Activity   Drug Use Never     Social History     Tobacco Use   Smoking Status Never Smoker   Smokeless Tobacco Never Used     Marital Status: /Civil Union      Home Medications:   Prior to Admission medications    Medication Sig Start Date End Date Taking? Authorizing Provider   aspirin (ECOTRIN LOW STRENGTH) 81 mg EC tablet Take 1 tablet (81 mg total) by mouth daily 1/13/22   Rajiv Pastrana MD   clopidogrel (PLAVIX) 75 mg tablet Take 1 tablet (75 mg total) by mouth daily  Patient not taking: Reported on 2/15/2022  1/13/22   Rajiv Pastrana MD   cyanocobalamin (VITAMIN B-12) 100 MCG tablet Take 100 mcg by mouth daily    Historical Provider, MD   metoprolol tartrate (LOPRESSOR) 25 mg tablet Take 1 tablet (25 mg total) by mouth every 12 (twelve) hours  Patient not taking: Reported on 2/15/2022  1/12/22   Rajiv Pastrana MD   nitroglycerin (NITROSTAT) 0 4 mg SL tablet Place 1 tablet (0 4 mg total) under the tongue every 5 (five) minutes as needed for chest pain  Patient not taking: Reported on 2/15/2022  1/12/22   Rajiv Pastrana MD   predniSONE 20 mg tablet Take 2 tablets (40 mg total) by mouth daily for 7 days, THEN 1 5 tablets (30 mg total) daily for 7 days, THEN 1 tablet (20 mg total) daily for 7 days, THEN 0 5 tablets (10 mg total) daily for 7 days  2/14/22 3/14/22  Reza Brownlee MD       Inpatient Medications:  Scheduled Meds:   Current Facility-Administered Medications   Medication Dose Route Frequency Provider Last Rate    acetaminophen  650 mg Oral Q6H PRN Francie Flores MD      albuterol  2 puff Inhalation Q4H PRN Margean Reges, DO      aspirin  81 mg Oral Daily Francie Flores MD      atorvastatin  40 mg Oral Daily With Flo Benoit MD      clopidogrel  75 mg Oral Daily Francie Flores MD      docusate sodium  100 mg Oral BID Francie Flores MD      furosemide  8 mg/hr Intravenous Continuous Francie Flores MD 8 mg/hr (02/16/22 0839)    heparin (porcine)  3-20 Units/kg/hr (Order-Specific) Intravenous Titrated Francie Flores MD 20 Units/kg/hr (02/16/22 1401)    heparin (porcine)  2,000 Units Intravenous Q1H PRN Francie Flores MD      heparin (porcine)  4,000 Units Intravenous Q1H PRN Francie Flores MD      metoprolol tartrate  12 5 mg Oral [facility] Francie Flores MD      polyethylene glycol  17 g Oral Daily PRN Francie Flores MD      senna  3 tablet Oral BID PRN Francie Flores MD      sodium chloride  50 mL/hr Intravenous Continuous Na Cotto, DO 50 mL/hr (02/16/22 1636)     Continuous Infusions: furosemide, 8 mg/hr, Last Rate: 8 mg/hr (02/16/22 0839)  heparin (porcine), 3-20 Units/kg/hr (Order-Specific), Last Rate: 20 Units/kg/hr (02/16/22 1401)  sodium chloride, 50 mL/hr, Last Rate: 50 mL/hr (02/16/22 1636)      PRN Meds:  acetaminophen, 650 mg, Q6H PRN  albuterol, 2 puff, Q4H PRN  heparin (porcine), 2,000 Units, Q1H PRN  heparin (porcine), 4,000 Units, Q1H PRN  polyethylene glycol, 17 g, Daily PRN  senna, 3 tablet, BID PRN        Allergies:  No Known Allergies    Review of Systems:  Review of Systems   Constitutional: Positive for activity change and fatigue  HENT: Negative  Eyes: Negative      Respiratory: Positive for chest tightness and shortness of breath  Cardiovascular: Positive for chest pain and leg swelling  Endocrine: Negative  Genitourinary: Negative  Musculoskeletal: Negative  Skin: Negative  Neurological: Positive for weakness  Psychiatric/Behavioral: Negative  Vital Signs:     Vitals:    02/16/22 1300 02/16/22 1404 02/16/22 1406 02/16/22 1600   BP: 103/67 103/69  112/72   BP Location:       Pulse: (!) 113  (!) 122 (!) 121   Resp: 18   20   Temp: 99 °F (37 2 °C)      TempSrc: Oral      SpO2: 98%   92%   Weight:       Height:         Invasive Devices  Report    Peripheral Intravenous Line            Peripheral IV 02/14/22 Right Forearm 1 day    Peripheral IV 02/15/22 Dorsal (posterior); Left Hand 1 day          Drain            Urethral Catheter Non-latex;Straight-tip 16 Fr  1 day                Physical Exam:  Physical Exam  Constitutional:       Appearance: Normal appearance  He is well-developed  HENT:      Head: Normocephalic and atraumatic  Eyes:      Conjunctiva/sclera: Conjunctivae normal    Neck:      Thyroid: No thyromegaly  Vascular: No carotid bruit or JVD  Trachea: No tracheal deviation  Cardiovascular:      Rate and Rhythm: Normal rate and regular rhythm  Pulses:           Carotid pulses are 2+ on the right side and 2+ on the left side  Dorsalis pedis pulses are 2+ on the right side and 2+ on the left side  Posterior tibial pulses are 2+ on the right side and 2+ on the left side  Heart sounds: S1 normal and S2 normal  Murmur heard  Pulmonary:      Effort: No accessory muscle usage or respiratory distress  Breath sounds: Rhonchi present  No wheezing or rales  Chest:      Chest wall: No tenderness  Abdominal:      General: Bowel sounds are normal       Palpations: Abdomen is soft  Tenderness: There is no abdominal tenderness  Musculoskeletal:         General: Normal range of motion        Cervical back: Full passive range of motion without pain and normal range of motion  Right lower leg: Edema present  Left lower leg: Edema present  Skin:     General: Skin is warm and dry  Neurological:      Mental Status: He is alert and oriented to person, place, and time  Cranial Nerves: No cranial nerve deficit  Sensory: No sensory deficit  Psychiatric:         Speech: Speech normal          Behavior: Behavior normal          Lab Results:     Results from last 7 days   Lab Units 02/16/22  0609 02/15/22  0530 02/14/22  2242   WBC Thousand/uL 9 07 11 75* 8 56   HEMOGLOBIN g/dL 10 1* 11 0* 12 0   HEMATOCRIT % 31 4* 34 3* 37 4   PLATELETS Thousands/uL 253 281  281 285     Results from last 7 days   Lab Units 02/16/22  0609 02/14/22  2243   POTASSIUM mmol/L 2 9* 4 0   CHLORIDE mmol/L 103 106   CO2 mmol/L 26 23   BUN mg/dL 16 21   CREATININE mg/dL 0 70 0 85   CALCIUM mg/dL 8 0* 8 5     Results from last 7 days   Lab Units 02/16/22  0932 02/16/22  0024 02/15/22  2019 02/15/22  1228 02/15/22  0534 02/14/22  2244 02/14/22  2244   INR   --   --   --   --  1 12  --  1 16   PTT seconds 38* 41* 47*   < > 37   < > 39*    < > = values in this interval not displayed  Lab Results   Component Value Date    HGBA1C 6 5 (H) 01/06/2022     Lab Results   Component Value Date    CKTOTAL 245 02/14/2022    CKMB 12 6 (H) 02/14/2022    CKMBINDEX 5 1 (H) 02/14/2022       Imaging Studies:     Cardiac Catheterization: CAD; Full report pending    Echocardiogram: EF 20%  Moderate MR  Mild TR  (EF 40% 1/10/22)        I have personally reviewed pertinent reports  and I have personally reviewed pertinent films in PACS    Assessment:  Principal Problem:    Elevated troponin  Active Problems:    Paraplegia (HCC)    Acute on chronic systolic congestive heart failure (HCC)    Acute on chronic respiratory failure with hypoxia (HCC)    Hemoptysis    Type 2 diabetes mellitus (HCC)    Severe coronary artery disease;  Ongoing CABG workup    Plan:  Risks and benefits of coronary artery bypass grafting were discussed in detail today with the patient  In light of his recent COVID infection with significant pulmonary infiltrates and significant cardiomyopathy, and chronic paraplegia, surgical risk would be prohibitively high  We are recommending medical managemtent +/- PCI  Verito Maynard was comfortable with our recommendations, and their questions were answered to their satisfaction  We will continue to evaluate the patient daily with further recommendations as work up is completed  Thank you for allowing us to participate in the care of this patient  SIGNATURE: Michelle Mendoza PA-C  DATE: February 16, 2022  TIME: 4:45 PM    * This note was completed in part utilizing [employer] direct voice recognition software  Grammatical errors, random word insertion, spelling mistakes, and incomplete sentences may be an occasional consequence of the system secondary to software limitations, ambient noise and hardware issues  At the time of dictation, efforts were made to edit, clarify and /or correct errors  Please read the chart carefully and recognize, using context, where substitutions have occurred  If you have any questions or concerns about the context, text or information contained within the body of this dictation, please contact myself, the provider, for further clarification

## 2022-02-16 NOTE — OCCUPATIONAL THERAPY NOTE
Occupational Therapy Cancellation Note        Patient Name: Marcelino Villagran  MQCHP'V Date: 2/16/2022 02/16/22 0755   OT Last Visit   OT Visit Date 02/16/22   Note Type   Note type Evaluation   Cancel Reasons Medical status       OT orders received, chart reviewed  Noted that pt will be going for cardiac cath this date  OT will hold at this time and continue to follow and see as medically appropriate and able       Ashutosh Reece, MOT, OTR/L

## 2022-02-17 ENCOUNTER — APPOINTMENT (INPATIENT)
Dept: RADIOLOGY | Facility: HOSPITAL | Age: 72
DRG: 246 | End: 2022-02-17
Payer: MEDICARE

## 2022-02-17 PROBLEM — D50.9 IRON DEFICIENCY ANEMIA: Status: ACTIVE | Noted: 2022-02-17

## 2022-02-17 LAB
ANION GAP SERPL CALCULATED.3IONS-SCNC: 9 MMOL/L (ref 4–13)
ANION GAP SERPL CALCULATED.3IONS-SCNC: 9 MMOL/L (ref 4–13)
APTT PPP: 62 SECONDS (ref 23–37)
APTT PPP: 64 SECONDS (ref 23–37)
ATRIAL RATE: 123 BPM
ATRIAL RATE: 124 BPM
BUN SERPL-MCNC: 20 MG/DL (ref 5–25)
BUN SERPL-MCNC: 26 MG/DL (ref 5–25)
CALCIUM SERPL-MCNC: 8.7 MG/DL (ref 8.3–10.1)
CALCIUM SERPL-MCNC: 8.8 MG/DL (ref 8.3–10.1)
CHLORIDE SERPL-SCNC: 100 MMOL/L (ref 100–108)
CHLORIDE SERPL-SCNC: 104 MMOL/L (ref 100–108)
CO2 SERPL-SCNC: 24 MMOL/L (ref 21–32)
CO2 SERPL-SCNC: 27 MMOL/L (ref 21–32)
CREAT SERPL-MCNC: 0.74 MG/DL (ref 0.6–1.3)
CREAT SERPL-MCNC: 0.86 MG/DL (ref 0.6–1.3)
ERYTHROCYTE [DISTWIDTH] IN BLOOD BY AUTOMATED COUNT: 14.7 % (ref 11.6–15.1)
GFR SERPL CREATININE-BSD FRML MDRD: 87 ML/MIN/1.73SQ M
GFR SERPL CREATININE-BSD FRML MDRD: 93 ML/MIN/1.73SQ M
GLUCOSE SERPL-MCNC: 128 MG/DL (ref 65–140)
GLUCOSE SERPL-MCNC: 139 MG/DL (ref 65–140)
HCT VFR BLD AUTO: 31.8 % (ref 36.5–49.3)
HGB BLD-MCNC: 10.3 G/DL (ref 12–17)
MAGNESIUM SERPL-MCNC: 2.3 MG/DL (ref 1.6–2.6)
MCH RBC QN AUTO: 29.1 PG (ref 26.8–34.3)
MCHC RBC AUTO-ENTMCNC: 32.4 G/DL (ref 31.4–37.4)
MCV RBC AUTO: 90 FL (ref 82–98)
P AXIS: 39 DEGREES
P AXIS: 42 DEGREES
PHOSPHATE SERPL-MCNC: 4 MG/DL (ref 2.3–4.1)
PLATELET # BLD AUTO: 273 THOUSANDS/UL (ref 149–390)
PMV BLD AUTO: 10.9 FL (ref 8.9–12.7)
POTASSIUM SERPL-SCNC: 3.8 MMOL/L (ref 3.5–5.3)
POTASSIUM SERPL-SCNC: 4.5 MMOL/L (ref 3.5–5.3)
PR INTERVAL: 146 MS
PR INTERVAL: 152 MS
PROCALCITONIN SERPL-MCNC: 0.09 NG/ML
QRS AXIS: 11 DEGREES
QRS AXIS: 9 DEGREES
QRSD INTERVAL: 94 MS
QRSD INTERVAL: 94 MS
QT INTERVAL: 332 MS
QT INTERVAL: 334 MS
QTC INTERVAL: 475 MS
QTC INTERVAL: 479 MS
RBC # BLD AUTO: 3.54 MILLION/UL (ref 3.88–5.62)
SODIUM SERPL-SCNC: 136 MMOL/L (ref 136–145)
SODIUM SERPL-SCNC: 137 MMOL/L (ref 136–145)
T WAVE AXIS: 155 DEGREES
T WAVE AXIS: 164 DEGREES
VENTRICULAR RATE: 123 BPM
VENTRICULAR RATE: 124 BPM
WBC # BLD AUTO: 8.7 THOUSAND/UL (ref 4.31–10.16)

## 2022-02-17 PROCEDURE — 84145 PROCALCITONIN (PCT): CPT | Performed by: STUDENT IN AN ORGANIZED HEALTH CARE EDUCATION/TRAINING PROGRAM

## 2022-02-17 PROCEDURE — 85027 COMPLETE CBC AUTOMATED: CPT | Performed by: STUDENT IN AN ORGANIZED HEALTH CARE EDUCATION/TRAINING PROGRAM

## 2022-02-17 PROCEDURE — 71045 X-RAY EXAM CHEST 1 VIEW: CPT

## 2022-02-17 PROCEDURE — 85730 THROMBOPLASTIN TIME PARTIAL: CPT | Performed by: INTERNAL MEDICINE

## 2022-02-17 PROCEDURE — 99232 SBSQ HOSP IP/OBS MODERATE 35: CPT | Performed by: INTERNAL MEDICINE

## 2022-02-17 PROCEDURE — 80048 BASIC METABOLIC PNL TOTAL CA: CPT | Performed by: INTERNAL MEDICINE

## 2022-02-17 PROCEDURE — 97166 OT EVAL MOD COMPLEX 45 MIN: CPT

## 2022-02-17 PROCEDURE — 84100 ASSAY OF PHOSPHORUS: CPT | Performed by: STUDENT IN AN ORGANIZED HEALTH CARE EDUCATION/TRAINING PROGRAM

## 2022-02-17 PROCEDURE — 83735 ASSAY OF MAGNESIUM: CPT | Performed by: STUDENT IN AN ORGANIZED HEALTH CARE EDUCATION/TRAINING PROGRAM

## 2022-02-17 PROCEDURE — 99233 SBSQ HOSP IP/OBS HIGH 50: CPT | Performed by: INTERNAL MEDICINE

## 2022-02-17 PROCEDURE — 93010 ELECTROCARDIOGRAM REPORT: CPT | Performed by: INTERNAL MEDICINE

## 2022-02-17 PROCEDURE — 80048 BASIC METABOLIC PNL TOTAL CA: CPT | Performed by: STUDENT IN AN ORGANIZED HEALTH CARE EDUCATION/TRAINING PROGRAM

## 2022-02-17 PROCEDURE — 93005 ELECTROCARDIOGRAM TRACING: CPT

## 2022-02-17 RX ORDER — POTASSIUM CHLORIDE 20 MEQ/1
40 TABLET, EXTENDED RELEASE ORAL ONCE
Status: COMPLETED | OUTPATIENT
Start: 2022-02-17 | End: 2022-02-17

## 2022-02-17 RX ORDER — FERROUS SULFATE 325(65) MG
325 TABLET ORAL EVERY OTHER DAY
Status: DISCONTINUED | OUTPATIENT
Start: 2022-02-17 | End: 2022-02-20 | Stop reason: HOSPADM

## 2022-02-17 RX ORDER — FUROSEMIDE 10 MG/ML
80 INJECTION INTRAMUSCULAR; INTRAVENOUS ONCE
Status: COMPLETED | OUTPATIENT
Start: 2022-02-17 | End: 2022-02-17

## 2022-02-17 RX ADMIN — CLOPIDOGREL BISULFATE 75 MG: 75 TABLET ORAL at 08:40

## 2022-02-17 RX ADMIN — POTASSIUM CHLORIDE 40 MEQ: 1500 TABLET, EXTENDED RELEASE ORAL at 05:39

## 2022-02-17 RX ADMIN — POTASSIUM CHLORIDE 40 MEQ: 1500 TABLET, EXTENDED RELEASE ORAL at 02:37

## 2022-02-17 RX ADMIN — Medication 12.5 MG: at 05:39

## 2022-02-17 RX ADMIN — FUROSEMIDE 80 MG: 10 INJECTION, SOLUTION INTRAVENOUS at 15:30

## 2022-02-17 RX ADMIN — POTASSIUM CHLORIDE 40 MEQ: 1500 TABLET, EXTENDED RELEASE ORAL at 11:39

## 2022-02-17 RX ADMIN — POTASSIUM CHLORIDE 40 MEQ: 1500 TABLET, EXTENDED RELEASE ORAL at 00:09

## 2022-02-17 RX ADMIN — ATORVASTATIN CALCIUM 40 MG: 40 TABLET, FILM COATED ORAL at 15:31

## 2022-02-17 RX ADMIN — METOPROLOL TARTRATE 25 MG: 25 TABLET, FILM COATED ORAL at 21:02

## 2022-02-17 RX ADMIN — ASPIRIN 81 MG: 81 TABLET, COATED ORAL at 08:39

## 2022-02-17 RX ADMIN — METOPROLOL TARTRATE 25 MG: 25 TABLET, FILM COATED ORAL at 15:29

## 2022-02-17 NOTE — PROGRESS NOTES
INTERNAL MEDICINE RESIDENCY PROGRESS NOTE     Name: Michelle Maurer   Age & Sex: 81 y o  male   MRN: [id number]  Unit/Bed#: [facility] 431-01   Encounter: [id number]  Team: SOD Team B     PATIENT INFORMATION     Name: Michelle Maurer   Age & Sex: 81 y o  male   MRN: [id number]  Hospital Stay Days: 2    ASSESSMENT/PLAN     Principal Problem:    Elevated troponin  Active Problems:    Paraplegia ([address] )    Acute on chronic systolic congestive heart failure (HCC)    Acute on chronic respiratory failure with hypoxia (HCC)    Hemoptysis    Type 2 diabetes mellitus (HCC)    Iron deficiency anemia      Hemoptysis  Assessment & Plan  Has been occurring for the last month, likely associated with COVID-19 pneumonia  Plan  - Daily CBC  - Continue diuresis as above  - Wean supplemental O2 as tolerated    Acute on chronic respiratory failure with hypoxia (HCC)  Assessment & Plan  See A/P for acute on chronic systolic congestive heart failure  Had recent COVID infection and was weaned off NC O2 before worsening over again in context of the heart failure  Acute on chronic systolic congestive heart failure [facility])  Assessment & Plan  Wt Readings from Last 3 Encounters:   02/17/22 77 2 kg (170 lb 3 1 oz)   02/15/22 83 2 kg (183 lb 6 8 oz)   01/12/22 82 7 kg (182 lb 5 1 oz)     Patient presents with worsening chest pain and dyspnea  BNP 1400  CTA PE showed no PE, bilateral groundglass airspace disease in a mostly perihilar distribution likely secondary to CHF (although component of residual COVID pneumonia not excluded), moderate bilateral pleural effusions  Developed hypotension after diuresis prompting slower diuresis with lasix gtt and BP monitoring  Has been hemodynamically stable, so transfer to [facility] for potential cath      Plan  - Pulm consulted regarding bilateral pulmonary effusions, they determined it is not necessary to perform thoracentesis   - Will switch lasix gtt to IV lasix 80mg  - Will increase metoprolol to 25mg due to tachycardia  - Monitor vital signs  - Continue lisinopril   - Daily weights  - Strict I/O  - Wean supplemental O2 as tolerated  - Fluid restriction 1800 cc and 2g sodium diet   - See "Elevated Troponin" for further plan    Paraplegia [facility])  Assessment & Plan  Baseline paraplegia  - No intervention    * Elevated troponin  Assessment & Plan  On prior admission (1/5/22), patient had initial hsTrop elevation to 85, which was deemed non-MI troponin elevation in setting of normal ECG  Developed new chest pain later that admission with new hsTrop elevation to >1600  ECG did not show acute ST changes, so he was sent home on ASA, plavix, statin, BB therapy  Patient was initially discharged with plan for outpatient nuclear stress test     Readmitted this time for chest pain and worsening shortness of breath, likely CHF exacerbation  HsTrop on admission 872 - peak 5732 - 4650  Associated with recent new reduction in LVEF  Transferred to [facility] for potential cardiac cath  Plan  - Cardiology consulted, appreciate recs  - Repeat echo revealed EF 20%, a decrease from 40% in January  (severe global hypokinesis, moderate MR, mild TR, mild NM)  - Coronary angiogram revealed severe multivessel coronary artery disease (100% stenosis of ramus, 99% stenosis of D1, 90% stenosis of prox LAD, 90% stenosis of prox Cx, 70% stenosis mid LAD, 100% stenosis of ost RCA to prox RCA)  - Per cardiac surgery, the patient is not a candidate for surgical management due to poor pulmonary status, recommended medical management +/- PCI   - Cardiology planning for PCI   - Continue ASA, plavix, high intensity statin, low-dose BB  - Continue telemetry  - Monitor vital signs      Disposition: Continue inpatient management, cardiology and pulmonology following, appreciate recs     SUBJECTIVE     Patient seen and examined  No acute events overnight  This morning the patient has no acute complaints   Denies chest pain, SOB, nausea, vomiting  OBJECTIVE     Vitals:    22 0402 22 0500 22 0600 22 0700   BP:    95/62   BP Location:    Left arm   Pulse: (!) 107   (!) 111   Resp:    Temp:    98 3 °F (36 8 °C)   TempSrc:    Oral   SpO2:    93%   Weight:  77 2 kg (170 lb 3 2 oz) 77 2 kg (170 lb 3 1 oz)    Height:          Temperature:   Temp (24hrs), Av 8 °F (37 1 °C), Min:98 3 °F (36 8 °C), Max:99 °F (37 2 °C)    Temperature: 98 3 °F (36 8 °C)  Intake & Output:  I/O       02/15 0701   0700  0701   0700  0701   0700    P  O  120 440     I V  (mL/kg) 107 330 2 (4 3)     IV Piggyback  50     Total Intake(mL/kg) 227 820 2 (10 6)     Urine (mL/kg/hr) 2375 4475 (2 4)     Total Output 2375 4475     Net -5696 -8768 8                Weights:   IBW (Ideal Body Weight): 59 2 kg    Body mass index is 29 21 kg/m²  Weight (last 2 days)     Date/Time Weight    22 0600 77 2 (170 2)    22 0500 77 2 (170 2)    22 1106 83 (183)        Physical Exam  Vitals and nursing note reviewed  Constitutional:       Appearance: He is well-developed  HENT:      Head: Normocephalic and atraumatic  Eyes:      Conjunctiva/sclera: Conjunctivae normal    Cardiovascular:      Rate and Rhythm: Regular rhythm  Tachycardia present  Heart sounds: Normal heart sounds  No murmur heard  Pulmonary:      Effort: Pulmonary effort is normal  No respiratory distress  Breath sounds: Rales (Faint bibasilar) present  Abdominal:      General: There is distension  Palpations: Abdomen is soft  Tenderness: There is no abdominal tenderness  Musculoskeletal:      Cervical back: Neck supple  Right lower leg: Edema (2+) present  Left lower leg: Edema (2+) present  Skin:     General: Skin is warm and dry  Neurological:      Mental Status: He is alert and oriented to person, place, and time  LABORATORY DATA     Labs: I have personally reviewed pertinent reports    Results from last 7 days   Lab Units 02/17/22  0533 02/16/22  0609 02/15/22  0530 02/14/22  2242 02/14/22  2242   WBC Thousand/uL 8 70 9 07 11 75*   < > 8 56   HEMOGLOBIN g/dL 10 3* 10 1* 11 0*   < > 12 0   HEMATOCRIT % 31 8* 31 4* 34 3*   < > 37 4   PLATELETS Thousands/uL 273 253 281  281   < > 285   NEUTROS PCT %  --  63 76*  --  90*   MONOS PCT %  --  11 10  --  4    < > = values in this interval not displayed  Results from last 7 days   Lab Units 02/17/22  0533 02/16/22  2100 02/16/22  0609 02/14/22 2243 02/14/22 2243   POTASSIUM mmol/L 3 8 3 0* 2 9*   < > 4 0   CHLORIDE mmol/L 100 99* 103   < > 106   CO2 mmol/L 27 29 26   < > 23   BUN mg/dL 20 18 16   < > 21   CREATININE mg/dL 0 74 0 85 0 70   < > 0 85   CALCIUM mg/dL 8 8 8 2* 8 0*   < > 8 5   ALK PHOS U/L  --   --  65  --  87   ALT U/L  --   --  34  --  44   AST U/L  --   --  59*  --  50*    < > = values in this interval not displayed  Results from last 7 days   Lab Units 02/17/22  0533 02/16/22  0609   MAGNESIUM mg/dL 2 3 1 9     Results from last 7 days   Lab Units 02/17/22  0533 02/16/22  0609   PHOSPHORUS mg/dL 4 0 3 8      Results from last 7 days   Lab Units 02/17/22  0533 02/16/22  0932 02/16/22  0024 02/15/22  1228 02/15/22  0534 02/14/22  2244 02/14/22  2244   INR   --   --   --   --  1 12  --  1 16   PTT seconds 64* 38* 41*   < > 37   < > 39*    < > = values in this interval not displayed  Results from last 7 days   Lab Units 02/15/22  0133   LACTIC ACID mmol/L 2 0           IMAGING & DIAGNOSTIC TESTING     Radiology Results: I have personally reviewed pertinent reports  CTA ED chest PE study    Result Date: 2/15/2022  Impression: No evidence of pulmonary embolus  Bilateral groundglass airspace disease in a mostly perihilar distribution likely secondary to CHF although component of residual COVID pneumonia not excluded   Moderate bilateral pleural effusions Workstation performed: [id number]     Other Diagnostic Testing: I have personally reviewed pertinent reports  ACTIVE MEDICATIONS     Current Facility-Administered Medications   Medication Dose Route Frequency    acetaminophen (TYLENOL) tablet 650 mg  650 mg Oral Q6H PRN    albuterol (PROVENTIL HFA,VENTOLIN HFA) inhaler 2 puff  2 puff Inhalation Q4H PRN    aspirin (ECOTRIN LOW STRENGTH) EC tablet 81 mg  81 mg Oral Daily    atorvastatin (LIPITOR) tablet 40 mg  40 mg Oral Daily With Dinner    clopidogrel (PLAVIX) tablet 75 mg  75 mg Oral Daily    docusate sodium (COLACE) capsule 100 mg  100 mg Oral BID    furosemide (LASIX) injection 80 mg  80 mg Intravenous Once    heparin (porcine) 25,000 units in 0 45% in sodium chloride 250 ml infusion (CMPD)  3-20 Units/kg/hr (Order-Specific) Intravenous Titrated    heparin (porcine) injection 2,000 Units  2,000 Units Intravenous Q1H PRN    heparin (porcine) injection 4,000 Units  4,000 Units Intravenous Q1H PRN    metoprolol tartrate (LOPRESSOR) tablet 25 mg  25 mg Oral Q8H [address]    polyethylene glycol (MIRALAX) packet 17 g  17 g Oral Daily PRN    potassium chloride (K-DUR,KLOR-CON) CR tablet 40 mEq  40 mEq Oral Once    senna (SENOKOT) tablet 25 8 mg  3 tablet Oral BID PRN       VTE Pharmacologic Prophylaxis: Heparin  VTE Mechanical Prophylaxis: sequential compression device    Portions of the record may have been created with voice recognition software  Occasional wrong word or "sound a like" substitutions may have occurred due to the inherent limitations of voice recognition software    Read the chart carefully and recognize, using context, where substitutions have occurred   ==  Jordy [address]  Internal Medicine MS-4

## 2022-02-17 NOTE — PLAN OF CARE
Problem: MOBILITY - ADULT  Goal: Maintain or return to baseline ADL function  Description: INTERVENTIONS:  -  Assess patient's ability to carry out ADLs; assess patient's baseline for ADL function and identify physical deficits which impact ability to perform ADLs (bathing, care of mouth/teeth, toileting, grooming, dressing, etc )  - Assess/evaluate cause of self-care deficits   - Assess range of motion  - Assess patient's mobility; develop plan if impaired  - Assess patient's need for assistive devices and provide as appropriate  - Encourage maximum independence but intervene and supervise when necessary  - Involve family in performance of ADLs  - Assess for home care needs following discharge   - Consider OT consult to assist with ADL evaluation and planning for discharge  - Provide patient education as appropriate  Outcome: Progressing  Goal: Maintains/Returns to pre admission functional level  Description: INTERVENTIONS:  - Perform BMAT or MOVE assessment daily    - Set and communicate daily mobility goal to care team and patient/family/caregiver  - Collaborate with rehabilitation services on mobility goals if consulted  - Perform Range of Motion 2 times a day  - Reposition patient every 2 hours    - Dangle patient 0 times a day  - Stand patient  0 times a day  - Ambulate patient 0 times a day  - Out of bed to chair 0 times a day   - Out of bed for meals 0 times a day  - Out of bed for toileting  - Record patient progress and toleration of activity level   Outcome: Progressing     Problem: Prexisting or High Potential for Compromised Skin Integrity  Goal: Skin integrity is maintained or improved  Description: INTERVENTIONS:  - Identify patients at risk for skin breakdown  - Assess and monitor skin integrity  - Assess and monitor nutrition and hydration status  - Monitor labs   - Assess for incontinence   - Turn and reposition patient  - Assist with mobility/ambulation  - Relieve pressure over bony prominences  - Avoid friction and shearing  - Provide appropriate hygiene as needed including keeping skin clean and dry  - Evaluate need for skin moisturizer/barrier cream  - Collaborate with interdisciplinary team   - Patient/family teaching  - Consider wound care consult   Outcome: Progressing     Problem: Nutrition/Hydration-ADULT  Goal: Nutrient/Hydration intake appropriate for improving, restoring or maintaining nutritional needs  Description: Monitor and assess patient's nutrition/hydration status for malnutrition  Collaborate with interdisciplinary team and initiate plan and interventions as ordered  Monitor patient's weight and dietary intake as ordered or per policy  Utilize nutrition screening tool and intervene as necessary  Determine patient's food preferences and provide high-protein, high-caloric foods as appropriate       INTERVENTIONS:  - Monitor oral intake, urinary output, labs, and treatment plans  - Assess nutrition and hydration status and recommend course of action  - Evaluate amount of meals eaten  - Assist patient with eating if necessary   - Allow adequate time for meals  - Recommend/ encourage appropriate diets, oral nutritional supplements, and vitamin/mineral supplements  - Order, calculate, and assess calorie counts as needed  - Recommend, monitor, and adjust tube feedings and TPN/PPN based on assessed needs  - Assess need for intravenous fluids  - Provide specific nutrition/hydration education as appropriate  - Include patient/family/caregiver in decisions related to nutrition  Outcome: Progressing
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
## 2022-02-17 NOTE — PROGRESS NOTES
PULMONOLOGY PROGRESS NOTE     Name: Judy Hobbs   Age & Sex: 81 y o  male   MRN: [id number]  Unit/Bed#: [facility] 431-01   Encounter: [id number]    PATIENT INFORMATION     Name: Judy Hobbs   Age & Sex: 81 y o  male   MRN: [id number]  Hospital Stay Days: 2    ASSESSMENT/PLAN     Principal Problem:    Elevated troponin  Active Problems:    Paraplegia ([address] )    Acute on chronic systolic congestive heart failure (HCC)    Acute on chronic respiratory failure with hypoxia (HCC)    Hemoptysis    Type 2 diabetes mellitus (HCC)    Iron deficiency anemia    Assessment:      1  Acute on chronic hypoxic respiratory failure   -2 L nasal cannula baseline post discharge from recent [facility] hospitalization  -secondary to acute on chronic CHF/volume overload, bilateral pleural effusion  consider resolving sequelae of COVID  Doubt bacterial pneumonia     2  Acute on chronic systolic CHF  -the onset cardiomyopathy as of January 2022  -elevated high sensitivity troponin     3  Bilateral pleural effusion  -secondary to heart failure exacerbation     4  Type 2 myocardial infarction  -suspect in setting of demand due to CHF exacerbation  -elevated high sensitivity troponin     5  Emphysema/COPD - no PFTs on file - not in exacerbation  -emphysematous changes noted on imaging     6  Hemoptysis - resolved  -reported prior to presentation at [address] [place] [address]  7  Recent COVID-19 pneumonia  -on vaccination  -hospitalization in January 2020 to  -CT imaging shows ground-glass opacities with lymphadenopathy which may be secondary to resolving COVID-19 inflammation/infection        Plan:     -continue supplemental oxygen and titrate as tolerated; goal SpO2 >88%  -cardiology consulted and following; IV diuretics per Cardiology and primary    On Lasix GTT  -PCI planning per Cardiology  -not CABG candidate  -cardiac diet and fluid restriction  -no plans for thoracentesis at this moment; will evaluate for thoracentesis if pleural effusions do not resolve with continued diuretics  May resume anticoagulation  -no plans for bronchoscopy or steroids at this moment; hemoptysis resolved and not in COPD/emphysema exacerbation  Also denies excessive sputum production  -patient afebrile without leukocytosis and procalcitonin negative; monitor off antibiotics  -add albuterol p r n   -out of bed as tolerated, incentive spirometry  -overall patient improving with diuresis    Pulmonary will sign-off please tiger text with any questions or concerns    SUBJECTIVE     Patient seen and examined  No acute events overnight  No major complaints  Reports breathing is improving and lower extremity swelling decreasing  OBJECTIVE     Vitals:    22 0402 22 0500 22 0600 22 0700   BP:    95/62   BP Location:    Left arm   Pulse: (!) 107   (!) 111   Resp:    Temp:    98 3 °F (36 8 °C)   TempSrc:    Oral   SpO2:    93%   Weight:  77 2 kg (170 lb 3 2 oz) 77 2 kg (170 lb 3 1 oz)    Height:          Temperature:   Temp (24hrs), Av 8 °F (37 1 °C), Min:98 3 °F (36 8 °C), Max:99 °F (37 2 °C)    Temperature: 98 3 °F (36 8 °C)  Intake & Output:  I/O       02/15 0701   0700  0701   0700  0701   0700    P  O  120 440     I V  (mL/kg) 107 330 2 (4 3)     IV Piggyback  50     Total Intake(mL/kg) 227 820 2 (10 6)     Urine (mL/kg/hr) 2375 4475 (2 4)     Total Output 2375 4475     Net -5893 -0610 8                Weights:   IBW (Ideal Body Weight): 59 2 kg    Body mass index is 29 21 kg/m²  Weight (last 2 days)     Date/Time Weight    22 0600 77 2 (170 2)    22 0500 77 2 (170 2)    22 1106 83 (183)        Physical Exam  Vitals reviewed  Constitutional:       General: He is not in acute distress  Appearance: He is not ill-appearing, toxic-appearing or diaphoretic  HENT:      Head: Normocephalic and atraumatic        Right Ear: External ear normal       Left Ear: External ear normal       Nose: Nose normal  Eyes:      General: No scleral icterus  Right eye: No discharge  Left eye: No discharge  Extraocular Movements: Extraocular movements intact  Conjunctiva/sclera: Conjunctivae normal    Cardiovascular:      Rate and Rhythm: Tachycardia present  Rhythm irregular  Pulses: Normal pulses  Heart sounds: Murmur heard  No friction rub  No gallop  Pulmonary:      Effort: Pulmonary effort is normal  No respiratory distress  Breath sounds: No wheezing  Comments: diminished breath sounds at lung bases  Abdominal:      General: Bowel sounds are normal  There is no distension  Palpations: Abdomen is soft  Tenderness: There is no abdominal tenderness  There is no guarding or rebound  Musculoskeletal:      Right lower leg: Edema present  Left lower leg: Edema present  Comments: +1 pitting edema bilateral extremities   Skin:     General: Skin is warm and dry  Neurological:      Mental Status: He is alert and oriented to person, place, and time  LABORATORY DATA     Labs: I have personally reviewed pertinent reports  Results from last 7 days   Lab Units 02/17/22  0533 02/16/22  0609 02/15/22  0530 02/14/22  2242 02/14/22  2242   WBC Thousand/uL 8 70 9 07 11 75*   < > 8 56   HEMOGLOBIN g/dL 10 3* 10 1* 11 0*   < > 12 0   HEMATOCRIT % 31 8* 31 4* 34 3*   < > 37 4   PLATELETS Thousands/uL 273 253 281  281   < > 285   NEUTROS PCT %  --  63 76*  --  90*   MONOS PCT %  --  11 10  --  4    < > = values in this interval not displayed        Results from last 7 days   Lab Units 02/17/22  0533 02/16/22  2100 02/16/22  0609 02/14/22  2243 02/14/22  2243   POTASSIUM mmol/L 3 8 3 0* 2 9*   < > 4 0   CHLORIDE mmol/L 100 99* 103   < > 106   CO2 mmol/L 27 29 26   < > 23   BUN mg/dL 20 18 16   < > 21   CREATININE mg/dL 0 74 0 85 0 70   < > 0 85   CALCIUM mg/dL 8 8 8 2* 8 0*   < > 8 5   ALK PHOS U/L  --   --  65  --  87   ALT U/L  --   --  34  --  44   AST U/L  --   -- 59*  --  50*    < > = values in this interval not displayed  Results from last 7 days   Lab Units 02/17/22  0533 02/16/22  0609   MAGNESIUM mg/dL 2 3 1 9     Results from last 7 days   Lab Units 02/17/22  0533 02/16/22  0609   PHOSPHORUS mg/dL 4 0 3 8      Results from last 7 days   Lab Units 02/17/22  1153 02/17/22  0533 02/16/22  0932 02/15/22  1228 02/15/22  0534 02/14/22  2244 02/14/22  2244   INR   --   --   --   --  1 12  --  1 16   PTT seconds 62* 64* 38*   < > 37   < > 39*    < > = values in this interval not displayed  Results from last 7 days   Lab Units 02/15/22  0133   LACTIC ACID mmol/L 2 0             ABG:       IMAGING & DIAGNOSTIC TESTING     Radiology Results: I have personally reviewed pertinent reports  XR chest portable    Result Date: 2/17/2022  Impression: Stable bilateral alveolar infiltrates and pleural effusions when compared to the most recent CT pulmonary angiogram   Differential continues to remain alveolar edema versus multifocal/multi lobar pneumonia  Correlate for Covid type pneumonia  Workstation performed: [id number]     CTA ED chest PE study    Result Date: 2/15/2022  Impression: No evidence of pulmonary embolus  Bilateral groundglass airspace disease in a mostly perihilar distribution likely secondary to CHF although component of residual COVID pneumonia not excluded  Moderate bilateral pleural effusions Workstation performed: [id number]     Other Diagnostic Testing: I have personally reviewed pertinent reports      ACTIVE MEDICATIONS     Current Facility-Administered Medications   Medication Dose Route Frequency    acetaminophen (TYLENOL) tablet 650 mg  650 mg Oral Q6H PRN    albuterol (PROVENTIL HFA,VENTOLIN HFA) inhaler 2 puff  2 puff Inhalation Q4H PRN    aspirin (ECOTRIN LOW STRENGTH) EC tablet 81 mg  81 mg Oral Daily    atorvastatin (LIPITOR) tablet 40 mg  40 mg Oral Daily With Dinner    clopidogrel (PLAVIX) tablet 75 mg  75 mg Oral Daily    docusate sodium (COLACE) capsule 100 mg  100 mg Oral BID    furosemide (LASIX) injection 80 mg  80 mg Intravenous Once    heparin (porcine) 25,000 units in 0 45% in sodium chloride 250 ml infusion (CMPD)  3-20 Units/kg/hr (Order-Specific) Intravenous Titrated    heparin (porcine) injection 2,000 Units  2,000 Units Intravenous Q1H PRN    heparin (porcine) injection 4,000 Units  4,000 Units Intravenous Q1H PRN    metoprolol tartrate (LOPRESSOR) tablet 25 mg  25 mg Oral Q8H CRUZ    polyethylene glycol (MIRALAX) packet 17 g  17 g Oral Daily PRN    senna (SENOKOT) tablet 25 8 mg  3 tablet Oral BID PRN       Portions of the record may have been created with voice recognition software  Occasional wrong word or "sound a like" substitutions may have occurred due to the inherent limitations of voice recognition software    Read the chart carefully and recognize, using context, where substitutions have occurred   ==  Aliyah Martin, [address] [place] [address]  Pulmonary/Critical Care Fellowship PGY-5
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
## 2022-02-17 NOTE — PLAN OF CARE
Problem: MOBILITY - ADULT  Goal: Maintain or return to baseline ADL function  Description: INTERVENTIONS:  -  Assess patient's ability to carry out ADLs; assess patient's baseline for ADL function and identify physical deficits which impact ability to perform ADLs (bathing, care of mouth/teeth, toileting, grooming, dressing, etc )  - Assess/evaluate cause of self-care deficits   - Assess range of motion  - Assess patient's mobility; develop plan if impaired  - Assess patient's need for assistive devices and provide as appropriate  - Encourage maximum independence but intervene and supervise when necessary  - Involve family in performance of ADLs  - Assess for home care needs following discharge   - Consider OT consult to assist with ADL evaluation and planning for discharge  - Provide patient education as appropriate  Outcome: Progressing  Goal: Maintains/Returns to pre admission functional level  Description: INTERVENTIONS:  - Perform BMAT or MOVE assessment daily    - Set and communicate daily mobility goal to care team and patient/family/caregiver     - Collaborate with rehabilitation services on mobility goals if consulted  - Out of bed for toileting  - Record patient progress and toleration of activity level   Outcome: Progressing     Problem: Prexisting or High Potential for Compromised Skin Integrity  Goal: Skin integrity is maintained or improved  Description: INTERVENTIONS:  - Identify patients at risk for skin breakdown  - Assess and monitor skin integrity  - Assess and monitor nutrition and hydration status  - Monitor labs   - Assess for incontinence   - Turn and reposition patient  - Assist with mobility/ambulation  - Relieve pressure over bony prominences  - Avoid friction and shearing  - Provide appropriate hygiene as needed including keeping skin clean and dry  - Evaluate need for skin moisturizer/barrier cream  - Collaborate with interdisciplinary team   - Patient/family teaching  - Consider wound care consult   Outcome: Progressing     Problem: Nutrition/Hydration-ADULT  Goal: Nutrient/Hydration intake appropriate for improving, restoring or maintaining nutritional needs  Description: Monitor and assess patient's nutrition/hydration status for malnutrition  Collaborate with interdisciplinary team and initiate plan and interventions as ordered  Monitor patient's weight and dietary intake as ordered or per policy  Utilize nutrition screening tool and intervene as necessary  Determine patient's food preferences and provide high-protein, high-caloric foods as appropriate       INTERVENTIONS:  - Monitor oral intake, urinary output, labs, and treatment plans  - Assess nutrition and hydration status and recommend course of action  - Evaluate amount of meals eaten  - Assist patient with eating if necessary   - Allow adequate time for meals  - Recommend/ encourage appropriate diets, oral nutritional supplements, and vitamin/mineral supplements  - Order, calculate, and assess calorie counts as needed  - Recommend, monitor, and adjust tube feedings and TPN/PPN based on assessed needs  - Assess need for intravenous fluids  - Provide specific nutrition/hydration education as appropriate  - Include patient/family/caregiver in decisions related to nutrition  Outcome: Progressing     Problem: Potential for Falls  Goal: Patient will remain free of falls  Description: INTERVENTIONS:  - Educate patient/family on patient safety including physical limitations  - Instruct patient to call for assistance with activity   - Consult OT/PT to assist with strengthening/mobility   - Keep Call bell within reach  - Keep bed low and locked with side rails adjusted as appropriate  - Keep care items and personal belongings within reach  - Initiate and maintain comfort rounds  - Make Fall Risk Sign visible to staff  - Apply yellow socks and bracelet for high fall risk patients  - Consider moving patient to room near nurses station  Outcome: Progressing

## 2022-02-18 LAB
ANION GAP SERPL CALCULATED.3IONS-SCNC: 9 MMOL/L (ref 4–13)
APTT PPP: 70 SECONDS (ref 23–37)
BUN SERPL-MCNC: 29 MG/DL (ref 5–25)
CALCIUM SERPL-MCNC: 9.1 MG/DL (ref 8.3–10.1)
CHLORIDE SERPL-SCNC: 100 MMOL/L (ref 100–108)
CO2 SERPL-SCNC: 26 MMOL/L (ref 21–32)
CREAT SERPL-MCNC: 0.66 MG/DL (ref 0.6–1.3)
ERYTHROCYTE [DISTWIDTH] IN BLOOD BY AUTOMATED COUNT: 14.9 % (ref 11.6–15.1)
GFR SERPL CREATININE-BSD FRML MDRD: 97 ML/MIN/1.73SQ M
GLUCOSE SERPL-MCNC: 137 MG/DL (ref 65–140)
HCT VFR BLD AUTO: 30 % (ref 36.5–49.3)
HGB BLD-MCNC: 9.8 G/DL (ref 12–17)
MAGNESIUM SERPL-MCNC: 2.5 MG/DL (ref 1.6–2.6)
MCH RBC QN AUTO: 29.1 PG (ref 26.8–34.3)
MCHC RBC AUTO-ENTMCNC: 32.7 G/DL (ref 31.4–37.4)
MCV RBC AUTO: 89 FL (ref 82–98)
PHOSPHATE SERPL-MCNC: 4.2 MG/DL (ref 2.3–4.1)
PLATELET # BLD AUTO: 252 THOUSANDS/UL (ref 149–390)
PMV BLD AUTO: 11.1 FL (ref 8.9–12.7)
POTASSIUM SERPL-SCNC: 3.7 MMOL/L (ref 3.5–5.3)
PROCALCITONIN SERPL-MCNC: 0.09 NG/ML
RBC # BLD AUTO: 3.37 MILLION/UL (ref 3.88–5.62)
SODIUM SERPL-SCNC: 135 MMOL/L (ref 136–145)
WBC # BLD AUTO: 8.37 THOUSAND/UL (ref 4.31–10.16)

## 2022-02-18 PROCEDURE — 93005 ELECTROCARDIOGRAM TRACING: CPT

## 2022-02-18 PROCEDURE — 99153 MOD SED SAME PHYS/QHP EA: CPT | Performed by: INTERNAL MEDICINE

## 2022-02-18 PROCEDURE — 99232 SBSQ HOSP IP/OBS MODERATE 35: CPT | Performed by: INTERNAL MEDICINE

## 2022-02-18 PROCEDURE — C1725 CATH, TRANSLUMIN NON-LASER: HCPCS | Performed by: INTERNAL MEDICINE

## 2022-02-18 PROCEDURE — 85730 THROMBOPLASTIN TIME PARTIAL: CPT | Performed by: INTERNAL MEDICINE

## 2022-02-18 PROCEDURE — 80048 BASIC METABOLIC PNL TOTAL CA: CPT | Performed by: STUDENT IN AN ORGANIZED HEALTH CARE EDUCATION/TRAINING PROGRAM

## 2022-02-18 PROCEDURE — C1874 STENT, COATED/COV W/DEL SYS: HCPCS | Performed by: INTERNAL MEDICINE

## 2022-02-18 PROCEDURE — 92928 PRQ TCAT PLMT NTRAC ST 1 LES: CPT | Performed by: INTERNAL MEDICINE

## 2022-02-18 PROCEDURE — 83735 ASSAY OF MAGNESIUM: CPT | Performed by: STUDENT IN AN ORGANIZED HEALTH CARE EDUCATION/TRAINING PROGRAM

## 2022-02-18 PROCEDURE — 84145 PROCALCITONIN (PCT): CPT | Performed by: STUDENT IN AN ORGANIZED HEALTH CARE EDUCATION/TRAINING PROGRAM

## 2022-02-18 PROCEDURE — NC001 PR NO CHARGE: Performed by: INTERNAL MEDICINE

## 2022-02-18 PROCEDURE — C9600 PERC DRUG-EL COR STENT SING: HCPCS | Performed by: INTERNAL MEDICINE

## 2022-02-18 PROCEDURE — 99152 MOD SED SAME PHYS/QHP 5/>YRS: CPT | Performed by: INTERNAL MEDICINE

## 2022-02-18 PROCEDURE — 93454 CORONARY ARTERY ANGIO S&I: CPT | Performed by: INTERNAL MEDICINE

## 2022-02-18 PROCEDURE — C1769 GUIDE WIRE: HCPCS | Performed by: INTERNAL MEDICINE

## 2022-02-18 PROCEDURE — 85347 COAGULATION TIME ACTIVATED: CPT

## 2022-02-18 PROCEDURE — C1894 INTRO/SHEATH, NON-LASER: HCPCS | Performed by: INTERNAL MEDICINE

## 2022-02-18 PROCEDURE — 85027 COMPLETE CBC AUTOMATED: CPT | Performed by: STUDENT IN AN ORGANIZED HEALTH CARE EDUCATION/TRAINING PROGRAM

## 2022-02-18 PROCEDURE — C1887 CATHETER, GUIDING: HCPCS | Performed by: INTERNAL MEDICINE

## 2022-02-18 PROCEDURE — 84100 ASSAY OF PHOSPHORUS: CPT | Performed by: STUDENT IN AN ORGANIZED HEALTH CARE EDUCATION/TRAINING PROGRAM

## 2022-02-18 DEVICE — STENT RONYX30012UX RESOLUTE ONYX 3.00X12
Type: IMPLANTABLE DEVICE | Site: CORONARY | Status: FUNCTIONAL
Brand: RESOLUTE ONYX™

## 2022-02-18 DEVICE — XIENCE SKYPOINT™ EVEROLIMUS ELUTING CORONARY STENT SYSTEM 3.50 MM X 08 MM / RAPID-EXCHANGE
Type: IMPLANTABLE DEVICE | Site: CORONARY | Status: FUNCTIONAL
Brand: XIENCE SKYPOINT™

## 2022-02-18 DEVICE — XIENCE SKYPOINT™ EVEROLIMUS ELUTING CORONARY STENT SYSTEM 3.50 MM X 18 MM / RAPID-EXCHANGE
Type: IMPLANTABLE DEVICE | Site: CORONARY | Status: FUNCTIONAL
Brand: XIENCE SKYPOINT™

## 2022-02-18 RX ORDER — FUROSEMIDE 10 MG/ML
80 INJECTION INTRAMUSCULAR; INTRAVENOUS ONCE
Status: DISCONTINUED | OUTPATIENT
Start: 2022-02-19 | End: 2022-02-19

## 2022-02-18 RX ORDER — HEPARIN SODIUM 1000 [USP'U]/ML
INJECTION, SOLUTION INTRAVENOUS; SUBCUTANEOUS AS NEEDED
Status: DISCONTINUED | OUTPATIENT
Start: 2022-02-18 | End: 2022-02-18 | Stop reason: HOSPADM

## 2022-02-18 RX ORDER — NITROGLYCERIN 20 MG/100ML
INJECTION INTRAVENOUS AS NEEDED
Status: DISCONTINUED | OUTPATIENT
Start: 2022-02-18 | End: 2022-02-18 | Stop reason: HOSPADM

## 2022-02-18 RX ORDER — SODIUM CHLORIDE 9 MG/ML
75 INJECTION, SOLUTION INTRAVENOUS CONTINUOUS
Status: DISCONTINUED | OUTPATIENT
Start: 2022-02-18 | End: 2022-02-19

## 2022-02-18 RX ORDER — FUROSEMIDE 10 MG/ML
80 INJECTION INTRAMUSCULAR; INTRAVENOUS ONCE
Status: COMPLETED | OUTPATIENT
Start: 2022-02-18 | End: 2022-02-18

## 2022-02-18 RX ORDER — ACETAMINOPHEN 325 MG/1
650 TABLET ORAL EVERY 4 HOURS PRN
Status: DISCONTINUED | OUTPATIENT
Start: 2022-02-18 | End: 2022-02-18

## 2022-02-18 RX ORDER — ONDANSETRON 2 MG/ML
4 INJECTION INTRAMUSCULAR; INTRAVENOUS EVERY 6 HOURS PRN
Status: DISCONTINUED | OUTPATIENT
Start: 2022-02-18 | End: 2022-02-20 | Stop reason: HOSPADM

## 2022-02-18 RX ORDER — LIDOCAINE HYDROCHLORIDE 10 MG/ML
INJECTION, SOLUTION EPIDURAL; INFILTRATION; INTRACAUDAL; PERINEURAL AS NEEDED
Status: DISCONTINUED | OUTPATIENT
Start: 2022-02-18 | End: 2022-02-18 | Stop reason: HOSPADM

## 2022-02-18 RX ORDER — POTASSIUM CHLORIDE 20 MEQ/1
40 TABLET, EXTENDED RELEASE ORAL ONCE
Status: COMPLETED | OUTPATIENT
Start: 2022-02-18 | End: 2022-02-18

## 2022-02-18 RX ORDER — SODIUM CHLORIDE 9 MG/ML
INJECTION, SOLUTION INTRAVENOUS
Status: COMPLETED | OUTPATIENT
Start: 2022-02-18 | End: 2022-02-18

## 2022-02-18 RX ORDER — MIDAZOLAM HYDROCHLORIDE 2 MG/2ML
INJECTION, SOLUTION INTRAMUSCULAR; INTRAVENOUS AS NEEDED
Status: DISCONTINUED | OUTPATIENT
Start: 2022-02-18 | End: 2022-02-18 | Stop reason: HOSPADM

## 2022-02-18 RX ORDER — VERAPAMIL HCL 2.5 MG/ML
AMPUL (ML) INTRAVENOUS AS NEEDED
Status: DISCONTINUED | OUTPATIENT
Start: 2022-02-18 | End: 2022-02-18 | Stop reason: HOSPADM

## 2022-02-18 RX ORDER — NITROGLYCERIN 0.4 MG/1
0.4 TABLET SUBLINGUAL
Status: DISCONTINUED | OUTPATIENT
Start: 2022-02-18 | End: 2022-02-20 | Stop reason: HOSPADM

## 2022-02-18 RX ORDER — FENTANYL CITRATE 50 UG/ML
INJECTION, SOLUTION INTRAMUSCULAR; INTRAVENOUS AS NEEDED
Status: DISCONTINUED | OUTPATIENT
Start: 2022-02-18 | End: 2022-02-18 | Stop reason: HOSPADM

## 2022-02-18 RX ADMIN — SODIUM CHLORIDE 75 ML/HR: 0.9 INJECTION, SOLUTION INTRAVENOUS at 16:37

## 2022-02-18 RX ADMIN — ATORVASTATIN CALCIUM 40 MG: 40 TABLET, FILM COATED ORAL at 16:38

## 2022-02-18 RX ADMIN — CLOPIDOGREL BISULFATE 75 MG: 75 TABLET ORAL at 08:36

## 2022-02-18 RX ADMIN — HEPARIN SODIUM 20 UNITS/KG/HR: 10000 INJECTION, SOLUTION INTRAVENOUS; SUBCUTANEOUS at 08:38

## 2022-02-18 RX ADMIN — ASPIRIN 81 MG: 81 TABLET, COATED ORAL at 08:36

## 2022-02-18 RX ADMIN — METOPROLOL TARTRATE 25 MG: 25 TABLET, FILM COATED ORAL at 04:58

## 2022-02-18 RX ADMIN — POTASSIUM CHLORIDE 40 MEQ: 1500 TABLET, EXTENDED RELEASE ORAL at 16:38

## 2022-02-18 RX ADMIN — METOPROLOL TARTRATE 25 MG: 25 TABLET, FILM COATED ORAL at 21:34

## 2022-02-18 RX ADMIN — FUROSEMIDE 80 MG: 10 INJECTION, SOLUTION INTRAMUSCULAR; INTRAVENOUS at 16:38

## 2022-02-18 NOTE — PROGRESS NOTES
Care transferred to [address] [place] [address] at this time  Right Wrist site assessed at the bedside  Site stable, no bleeding or swelling  Patient aware of their bed rest and restrictions  The patient is alert and oriented, denies pain at the moment  Call bell within reach and bed in low position
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
## 2022-02-18 NOTE — PROGRESS NOTES
Mr Brando Solorio initially presented in [facility] with an NSTEMI on 2/14/22  He was evaluated by Shashank Watters on 2/16/22 and was diagnosed with MVD (RCA , Severe LAD/LCX Disease)  He was then evaluated by CTS but was found unsuitable for surgical intervention  He is here today for a planed PCI to the LAD/LCX  The elevated risks as well as benefits of this PCI were discussed  The patient understands that the risks are higher for today's complicated PCI than for the diagnostic study he underwent on 2/16/22, conferring an approximately 2-3% risk of MACE including Death  Questions were answered, and the patient was agreeable to proceed

## 2022-02-18 NOTE — PROGRESS NOTES
INTERNAL MEDICINE RESIDENCY PROGRESS NOTE     Name: Dennise Hauser   Age & Sex: 81 y o  male   MRN: [id number]  Unit/Bed#: [facility] 431-01   Encounter: [id number]  Team: SOD Team B     PATIENT INFORMATION     Name: Dennise Hauser   Age & Sex: 81 y o  male   MRN: [id number]  Hospital Stay Days: 3    ASSESSMENT/PLAN     Principal Problem:    Elevated troponin  Active Problems:    Paraplegia ([address] )    Acute on chronic systolic congestive heart failure (HCC)    Acute on chronic respiratory failure with hypoxia (HCC)    Hemoptysis    Type 2 diabetes mellitus (HCC)    Iron deficiency anemia      Hemoptysis  Assessment & Plan  Has been occurring for the last month, likely associated with COVID-19 pneumonia  Plan  - Daily CBC  - Continue diuresis as above  - Wean supplemental O2 as tolerated    Acute on chronic respiratory failure with hypoxia (HCC)  Assessment & Plan  See A/P for acute on chronic systolic congestive heart failure  Had recent COVID infection and was weaned off NC O2 before worsening over again in context of the heart failure  Acute on chronic systolic congestive heart failure [facility])  Assessment & Plan  Wt Readings from Last 3 Encounters:   02/17/22 77 2 kg (170 lb 3 1 oz)   02/15/22 83 2 kg (183 lb 6 8 oz)   01/12/22 82 7 kg (182 lb 5 1 oz)     Patient presents with worsening chest pain and dyspnea  BNP 1400  CTA PE showed no PE, bilateral groundglass airspace disease in a mostly perihilar distribution likely secondary to CHF (although component of residual COVID pneumonia not excluded), moderate bilateral pleural effusions  Developed hypotension after diuresis prompting slower diuresis with lasix gtt and BP monitoring  Has been hemodynamically stable, so transfer to [facility] for potential cath      Plan  - Pulm consulted regarding bilateral pulmonary effusions, they determined it is not necessary to perform thoracentesis   - Continue lasix gtt to IV lasix 80mg  - Continue metoprolol to 25mg due to tachycardia  - Monitor vital signs  - Continue lisinopril   - Daily weights  - Strict I/O  - Wean supplemental O2 as tolerated  - Fluid restriction 1800 cc and 2g sodium diet   - See "Elevated Troponin" for further plan    Paraplegia [facility])  Assessment & Plan  Baseline paraplegia  - No intervention    * Elevated troponin  Assessment & Plan  On prior admission (1/5/22), patient had initial hsTrop elevation to 85, which was deemed non-MI troponin elevation in setting of normal ECG  Developed new chest pain later that admission with new hsTrop elevation to >1600  ECG did not show acute ST changes, so he was sent home on ASA, plavix, statin, BB therapy  Patient was initially discharged with plan for outpatient nuclear stress test     Readmitted this time for chest pain and worsening shortness of breath, likely CHF exacerbation  HsTrop on admission 872 - peak 5732 - 4650  Associated with recent new reduction in LVEF  Transferred to [facility] for potential cardiac cath  Plan  - Cardiology consulted, appreciate recs  - Repeat echo revealed EF 20%, a decrease from 40% in January  (severe global hypokinesis, moderate MR, mild TR, mild CA)  - Coronary angiogram revealed severe multivessel coronary artery disease (100% stenosis of ramus, 99% stenosis of D1, 90% stenosis of prox LAD, 90% stenosis of prox Cx, 70% stenosis mid LAD, 100% stenosis of ost RCA to prox RCA)  - Per cardiac surgery, the patient is not a candidate for surgical management due to poor pulmonary status, recommended medical management +/- PCI   - Cardiology planning for PCI   - Continue ASA, plavix, high intensity statin, low-dose BB  - Continue telemetry  - Monitor vital signs        Disposition: Continue inpatient management, cardiology following appreciate recs      SUBJECTIVE     Patient seen and examined  No acute events overnight  This morning the patient denies any acute complaints   Denies chest pain, SOB, n/v     OBJECTIVE     Vitals:    02/17/22 2102 22 0458 22 0600 22 0653   BP: 107/65 121/74  132/76   BP Location:    Left arm   Pulse: (!) 142 (!) 114  105   Resp:    16   Temp:    98 4 °F (36 9 °C)   TempSrc:    Oral   SpO2:    96%   Weight:   78 5 kg (173 lb)    Height:          Temperature:   Temp (24hrs), Av 4 °F (36 9 °C), Min:98 4 °F (36 9 °C), Max:98 4 °F (36 9 °C)    Temperature: 98 4 °F (36 9 °C)  Intake & Output:  I/O        0701   07 0701   07 07 07    P  O  440 1120     I V  (mL/kg) 330 2 (4 3) 288 (3 7)     IV Piggyback 50      Total Intake(mL/kg) 820 2 (10 6) 1408 (17 9)     Urine (mL/kg/hr) 4475 (2 4) 1550 (0 8)     Total Output 4475 1550     Net -3654 8 -142                Weights:   IBW (Ideal Body Weight): 59 2 kg    Body mass index is 29 7 kg/m²  Weight (last 2 days)     Date/Time Weight    22 0600 78 5 (173)    22 0600 77 2 (170 2)    22 0500 77 2 (170 2)    22 1106 83 (183)        Physical Exam  Vitals and nursing note reviewed  Constitutional:       Appearance: He is well-developed  HENT:      Head: Normocephalic and atraumatic  Eyes:      Conjunctiva/sclera: Conjunctivae normal    Cardiovascular:      Rate and Rhythm: Regular rhythm  Tachycardia present  Heart sounds: No murmur heard  Pulmonary:      Effort: Pulmonary effort is normal  No respiratory distress  Breath sounds: Normal breath sounds  Abdominal:      Palpations: Abdomen is soft  Tenderness: There is no abdominal tenderness  Musculoskeletal:      Cervical back: Neck supple  Right lower leg: Edema (1+) present  Left lower leg: Edema (1+) present  Skin:     General: Skin is warm and dry  Neurological:      Mental Status: He is alert  LABORATORY DATA     Labs: I have personally reviewed pertinent reports    Results from last 7 days   Lab Units 22  0455 22  0533 22  4904 02/15/22  0530 02/15/22  0530 22  2242 22  2242 WBC Thousand/uL 8 37 8 70 9 07   < > 11 75*   < > 8 56   HEMOGLOBIN g/dL 9 8* 10 3* 10 1*   < > 11 0*   < > 12 0   HEMATOCRIT % 30 0* 31 8* 31 4*   < > 34 3*   < > 37 4   PLATELETS Thousands/uL 252 273 253   < > 281  281   < > 285   NEUTROS PCT %  --   --  63  --  76*  --  90*   MONOS PCT %  --   --  11  --  10  --  4    < > = values in this interval not displayed  Results from last 7 days   Lab Units 02/18/22  0455 02/17/22  1637 02/17/22  0533 02/16/22  2100 02/16/22  0609 02/14/22  2243 02/14/22  2243   POTASSIUM mmol/L 3 7 4 5 3 8   < > 2 9*   < > 4 0   CHLORIDE mmol/L 100 104 100   < > 103   < > 106   CO2 mmol/L 26 24 27   < > 26   < > 23   BUN mg/dL 29* 26* 20   < > 16   < > 21   CREATININE mg/dL 0 66 0 86 0 74   < > 0 70   < > 0 85   CALCIUM mg/dL 9 1 8 7 8 8   < > 8 0*   < > 8 5   ALK PHOS U/L  --   --   --   --  65  --  87   ALT U/L  --   --   --   --  34  --  44   AST U/L  --   --   --   --  59*  --  50*    < > = values in this interval not displayed  Results from last 7 days   Lab Units 02/18/22 0455 02/17/22  0533 02/16/22  0609   MAGNESIUM mg/dL 2 5 2 3 1 9     Results from last 7 days   Lab Units 02/18/22 0455 02/17/22  0533 02/16/22  0609   PHOSPHORUS mg/dL 4 2* 4 0 3 8      Results from last 7 days   Lab Units 02/18/22  0455 02/17/22  1153 02/17/22  0533 02/15/22  1228 02/15/22  0534 02/14/22  2244 02/14/22  2244   INR   --   --   --   --  1 12  --  1 16   PTT seconds 70* 62* 64*   < > 37   < > 39*    < > = values in this interval not displayed  Results from last 7 days   Lab Units 02/15/22  0133   LACTIC ACID mmol/L 2 0           IMAGING & DIAGNOSTIC TESTING     Radiology Results: I have personally reviewed pertinent reports    XR chest portable    Result Date: 2/17/2022  Impression: Stable bilateral alveolar infiltrates and pleural effusions when compared to the most recent CT pulmonary angiogram   Differential continues to remain alveolar edema versus multifocal/multi lobar pneumonia  Correlate for Covid type pneumonia  Workstation performed: [id number]     CTA ED chest PE study    Result Date: 2/15/2022  Impression: No evidence of pulmonary embolus  Bilateral groundglass airspace disease in a mostly perihilar distribution likely secondary to CHF although component of residual COVID pneumonia not excluded  Moderate bilateral pleural effusions Workstation performed: [id number]     Other Diagnostic Testing: I have personally reviewed pertinent reports  ACTIVE MEDICATIONS     Current Facility-Administered Medications   Medication Dose Route Frequency    acetaminophen (TYLENOL) tablet 650 mg  650 mg Oral Q6H PRN    albuterol (PROVENTIL HFA,VENTOLIN HFA) inhaler 2 puff  2 puff Inhalation Q4H PRN    aspirin (ECOTRIN LOW STRENGTH) EC tablet 81 mg  81 mg Oral Daily    atorvastatin (LIPITOR) tablet 40 mg  40 mg Oral Daily With Dinner    clopidogrel (PLAVIX) tablet 75 mg  75 mg Oral Daily    docusate sodium (COLACE) capsule 100 mg  100 mg Oral BID    ferrous sulfate tablet 325 mg  325 mg Oral Every Other Day    heparin (porcine) 25,000 units in 0 45% in sodium chloride 250 ml infusion (CMPD)  3-20 Units/kg/hr (Order-Specific) Intravenous Titrated    heparin (porcine) injection 2,000 Units  2,000 Units Intravenous Q1H PRN    heparin (porcine) injection 4,000 Units  4,000 Units Intravenous Q1H PRN    metoprolol tartrate (LOPRESSOR) tablet 25 mg  25 mg Oral Q8H [facility]    polyethylene glycol (MIRALAX) packet 17 g  17 g Oral Daily PRN    senna (SENOKOT) tablet 25 8 mg  3 tablet Oral BID PRN       VTE Pharmacologic Prophylaxis: Heparin  VTE Mechanical Prophylaxis: sequential compression device    Portions of the record may have been created with voice recognition software  Occasional wrong word or "sound a like" substitutions may have occurred due to the inherent limitations of voice recognition software    Read the chart carefully and recognize, using context, where substitutions have occurred   ==  Jordy 84  Internal Medicine MS-4

## 2022-02-19 LAB
ANION GAP SERPL CALCULATED.3IONS-SCNC: 12 MMOL/L (ref 4–13)
APTT PPP: 39 SECONDS (ref 23–37)
BUN SERPL-MCNC: 22 MG/DL (ref 5–25)
CALCIUM SERPL-MCNC: 8.9 MG/DL (ref 8.3–10.1)
CHLORIDE SERPL-SCNC: 101 MMOL/L (ref 100–108)
CO2 SERPL-SCNC: 24 MMOL/L (ref 21–32)
CREAT SERPL-MCNC: 0.76 MG/DL (ref 0.6–1.3)
ERYTHROCYTE [DISTWIDTH] IN BLOOD BY AUTOMATED COUNT: 14.6 % (ref 11.6–15.1)
GFR SERPL CREATININE-BSD FRML MDRD: 92 ML/MIN/1.73SQ M
GLUCOSE SERPL-MCNC: 115 MG/DL (ref 65–140)
GLUCOSE SERPL-MCNC: 145 MG/DL (ref 65–140)
GLUCOSE SERPL-MCNC: 96 MG/DL (ref 65–140)
HCT VFR BLD AUTO: 32.3 % (ref 36.5–49.3)
HGB BLD-MCNC: 10.4 G/DL (ref 12–17)
KCT BLD-ACNC: 348 SEC (ref 89–137)
MAGNESIUM SERPL-MCNC: 2.4 MG/DL (ref 1.6–2.6)
MAGNESIUM SERPL-MCNC: 2.5 MG/DL (ref 1.6–2.6)
MCH RBC QN AUTO: 29.1 PG (ref 26.8–34.3)
MCHC RBC AUTO-ENTMCNC: 32.2 G/DL (ref 31.4–37.4)
MCV RBC AUTO: 91 FL (ref 82–98)
PHOSPHATE SERPL-MCNC: 3.2 MG/DL (ref 2.3–4.1)
PHOSPHATE SERPL-MCNC: 4 MG/DL (ref 2.3–4.1)
PLATELET # BLD AUTO: 270 THOUSANDS/UL (ref 149–390)
PMV BLD AUTO: 11 FL (ref 8.9–12.7)
POTASSIUM SERPL-SCNC: 4 MMOL/L (ref 3.5–5.3)
RBC # BLD AUTO: 3.57 MILLION/UL (ref 3.88–5.62)
SODIUM SERPL-SCNC: 137 MMOL/L (ref 136–145)
SPECIMEN SOURCE: ABNORMAL
WBC # BLD AUTO: 7.82 THOUSAND/UL (ref 4.31–10.16)

## 2022-02-19 PROCEDURE — 85730 THROMBOPLASTIN TIME PARTIAL: CPT | Performed by: INTERNAL MEDICINE

## 2022-02-19 PROCEDURE — 84100 ASSAY OF PHOSPHORUS: CPT | Performed by: STUDENT IN AN ORGANIZED HEALTH CARE EDUCATION/TRAINING PROGRAM

## 2022-02-19 PROCEDURE — 82948 REAGENT STRIP/BLOOD GLUCOSE: CPT

## 2022-02-19 PROCEDURE — 80048 BASIC METABOLIC PNL TOTAL CA: CPT | Performed by: STUDENT IN AN ORGANIZED HEALTH CARE EDUCATION/TRAINING PROGRAM

## 2022-02-19 PROCEDURE — 85027 COMPLETE CBC AUTOMATED: CPT | Performed by: STUDENT IN AN ORGANIZED HEALTH CARE EDUCATION/TRAINING PROGRAM

## 2022-02-19 PROCEDURE — 83735 ASSAY OF MAGNESIUM: CPT | Performed by: STUDENT IN AN ORGANIZED HEALTH CARE EDUCATION/TRAINING PROGRAM

## 2022-02-19 PROCEDURE — 99232 SBSQ HOSP IP/OBS MODERATE 35: CPT | Performed by: INTERNAL MEDICINE

## 2022-02-19 RX ORDER — FUROSEMIDE 10 MG/ML
40 INJECTION INTRAMUSCULAR; INTRAVENOUS ONCE
Status: DISCONTINUED | OUTPATIENT
Start: 2022-02-19 | End: 2022-02-20

## 2022-02-19 RX ORDER — SENNOSIDES 8.6 MG
6 TABLET ORAL 2 TIMES DAILY PRN
Status: DISCONTINUED | OUTPATIENT
Start: 2022-02-19 | End: 2022-02-20 | Stop reason: HOSPADM

## 2022-02-19 RX ORDER — AMMONIUM LACTATE 12 G/100G
CREAM TOPICAL 2 TIMES DAILY PRN
Status: DISCONTINUED | OUTPATIENT
Start: 2022-02-19 | End: 2022-02-20 | Stop reason: HOSPADM

## 2022-02-19 RX ORDER — LISINOPRIL 5 MG/1
5 TABLET ORAL DAILY
Status: DISCONTINUED | OUTPATIENT
Start: 2022-02-20 | End: 2022-02-20

## 2022-02-19 RX ORDER — HEPARIN SODIUM 5000 [USP'U]/ML
5000 INJECTION, SOLUTION INTRAVENOUS; SUBCUTANEOUS EVERY 8 HOURS SCHEDULED
Status: DISCONTINUED | OUTPATIENT
Start: 2022-02-19 | End: 2022-02-20 | Stop reason: HOSPADM

## 2022-02-19 RX ADMIN — ATORVASTATIN CALCIUM 40 MG: 40 TABLET, FILM COATED ORAL at 15:43

## 2022-02-19 RX ADMIN — HEPARIN SODIUM 5000 UNITS: 5000 INJECTION INTRAVENOUS; SUBCUTANEOUS at 21:48

## 2022-02-19 RX ADMIN — METOPROLOL TARTRATE 25 MG: 25 TABLET, FILM COATED ORAL at 21:48

## 2022-02-19 RX ADMIN — SODIUM CHLORIDE 75 ML/HR: 0.9 INJECTION, SOLUTION INTRAVENOUS at 03:42

## 2022-02-19 RX ADMIN — ASPIRIN 81 MG: 81 TABLET, COATED ORAL at 08:47

## 2022-02-19 RX ADMIN — METOPROLOL TARTRATE 25 MG: 25 TABLET, FILM COATED ORAL at 15:40

## 2022-02-19 RX ADMIN — FERROUS SULFATE TAB 325 MG (65 MG ELEMENTAL FE) 325 MG: 325 (65 FE) TAB at 08:47

## 2022-02-19 RX ADMIN — METOPROLOL TARTRATE 25 MG: 25 TABLET, FILM COATED ORAL at 05:31

## 2022-02-19 RX ADMIN — HEPARIN SODIUM 5000 UNITS: 5000 INJECTION INTRAVENOUS; SUBCUTANEOUS at 15:46

## 2022-02-19 RX ADMIN — CLOPIDOGREL BISULFATE 75 MG: 75 TABLET ORAL at 08:47

## 2022-02-19 NOTE — ASSESSMENT & PLAN NOTE
Wt Readings from Last 3 Encounters:   02/18/22 78 5 kg (173 lb)   02/15/22 83 2 kg (183 lb 6 8 oz)   01/12/22 82 7 kg (182 lb 5 1 oz)     Patient presents with worsening chest pain and dyspnea  BNP 1400  CTA PE showed no PE, bilateral groundglass airspace disease in a mostly perihilar distribution likely secondary to CHF (although component of residual COVID pneumonia not excluded), moderate bilateral pleural effusions  Developed hypotension after diuresis prompting slower diuresis with lasix gtt and BP monitoring  Has been hemodynamically stable, so transfer to [facility] for potential cath  Plan  - Pulm consulted regarding bilateral pulmonary effusions, they determined it is not necessary to perform thoracentesis   - transition to p o   Lasix 40 mg daily  - initiated on metoprolol extended 3 75 mg b i d   - further GDMT limited by BP  - Monitor vital signs  - Daily weights  - Strict I/O  - Wean supplemental O2 as tolerated  - Fluid restriction 1800 cc and 2g sodium diet   - See "Elevated Troponin" for further plan

## 2022-02-19 NOTE — ASSESSMENT & PLAN NOTE
Lab Results   Component Value Date    HGBA1C 6 5 (H) 01/06/2022       No results for input(s): POCGLU in the last 72 hours      Blood Sugar Average: Last 72 hrs:   at home controlled on diet  Continue sliding scale

## 2022-02-19 NOTE — PROGRESS NOTES
[address] [facility]  Progress Note - Valeria Offer 1950, 81 y o  male MRN: [id number]  Unit/Bed#: [facility] 431-01 Encounter: [id number]  Primary Care Provider: No primary care provider on file  Date and time admitted to hospital: 2/15/2022  5:27 PM    Iron deficiency anemia  Assessment & Plan  Continue iron supplementation    Type 2 diabetes mellitus (HCC)  Assessment & Plan  Lab Results   Component Value Date    HGBA1C 6 5 (H) 01/06/2022       No results for input(s): POCGLU in the last 72 hours  Blood Sugar Average: Last 72 hrs:   at home controlled on diet  Continue sliding scale    Hemoptysis  Assessment & Plan  Has been occurring for the last month, likely associated with COVID-19 pneumonia  Plan  - Daily CBC  - Continue diuresis as above  - Wean supplemental O2 as tolerated    Acute on chronic respiratory failure with hypoxia (HCC)  Assessment & Plan  See A/P for acute on chronic systolic congestive heart failure  Had recent COVID infection and was weaned off NC O2 before worsening over again in context of the heart failure  Acute on chronic systolic congestive heart failure [facility])  Assessment & Plan  Wt Readings from Last 3 Encounters:   02/17/22 77 2 kg (170 lb 3 1 oz)   02/15/22 83 2 kg (183 lb 6 8 oz)   01/12/22 82 7 kg (182 lb 5 1 oz)     Patient presents with worsening chest pain and dyspnea  BNP 1400  CTA PE showed no PE, bilateral groundglass airspace disease in a mostly perihilar distribution likely secondary to CHF (although component of residual COVID pneumonia not excluded), moderate bilateral pleural effusions  Developed hypotension after diuresis prompting slower diuresis with lasix gtt and BP monitoring  Has been hemodynamically stable, so transfer to [facility] for potential cath      Plan  - Pulm consulted regarding bilateral pulmonary effusions, they determined it is not necessary to perform thoracentesis   - Continue lasix gtt to IV lasix 80mg  - Continue metoprolol to 25mg due to tachycardia  - Monitor vital signs  - Continue lisinopril   - Daily weights  - Strict I/O  - Wean supplemental O2 as tolerated  - Fluid restriction 1800 cc and 2g sodium diet   - See "Elevated Troponin" for further plan    Paraplegia [facility])  Assessment & Plan  Baseline paraplegia  - No intervention    * Elevated troponin  Assessment & Plan  On prior admission (1/5/22), patient had initial hsTrop elevation to 85, which was deemed non-MI troponin elevation in setting of normal ECG  Developed new chest pain later that admission with new hsTrop elevation to >1600  ECG did not show acute ST changes, so he was sent home on ASA, plavix, statin, BB therapy  Patient was initially discharged with plan for outpatient nuclear stress test     Readmitted this time for chest pain and worsening shortness of breath, likely CHF exacerbation  HsTrop on admission 872 - peak 5732 - 4650  Associated with recent new reduction in LVEF  Transferred to [facility] [place] for potential cardiac cath  Plan  - Cardiology consulted, appreciate recs  - Repeat echo revealed EF 20%, a decrease from 40% in January  (severe global hypokinesis, moderate MR, mild TR, mild WV)  - Coronary angiogram revealed severe multivessel coronary artery disease (100% stenosis of ramus, 99% stenosis of D1, 90% stenosis of prox LAD, 90% stenosis of prox Cx, 70% stenosis mid LAD, 100% stenosis of ost RCA to prox RCA)  - Per cardiac surgery, the patient is not a candidate for surgical management due to poor pulmonary status, recommended medical management +/- PCI   -patient had high risk PCI yesterday, no complications, currently not having any chest pain  - Continue ASA, plavix, high intensity statin, low-dose BB  - Continue telemetry  - Monitor vital signs            VTE Pharmacologic Prophylaxis:     Moderate Risk (Score 3-4) - Pharmacological DVT Prophylaxis Ordered: Heparin  Mechanical VTE Prophylaxis in Place: Yes    Patient Centered Rounds:  I have performed bedside rounds with nursing staff today  Discussions with Specialists or Other Care Team Provider: yes    Education and Discussions with Family / Patient: yes    Current Length of Stay: 4 day(s)    Current Patient Status: Inpatient     Discharge Plan / Estimated Discharge Date: Anticipate discharge tomorrow to home with home services  Code Status: Level 3 - DNAR and DNI      Subjective:   Patient was seen and evaluated by me this morning at bedside, he remains saturating well on 2 L by nasal cannula, denies any chest pain  Patient has not had any bowel movement since he is in the hospital, denies any abdominal pain, nausea or vomiting  No other complaints  Patient had 11 beat run of V-tach this morning, continue to monitor on telemetry, continue beta-blockers  Objective:     Vitals:   Temp (24hrs), Av 4 °F (36 9 °C), Min:98 1 °F (36 7 °C), Max:98 8 °F (37 1 °C)    Temp:  [98 1 °F (36 7 °C)-98 8 °F (37 1 °C)] 98 1 °F (36 7 °C)  HR:  [100-114] 108  Resp:  [16-21] 18  BP: ()/(59-77) 91/59  SpO2:  [89 %-99 %] 93 %  Body mass index is 29 7 kg/m²  Input and Output Summary (last 24 hours): Intake/Output Summary (Last 24 hours) at 2022 1436  Last data filed at 2022 1242  Gross per 24 hour   Intake 894 ml   Output 3065 ml   Net -2171 ml       Physical Exam:     Physical Exam  Constitutional:       Appearance: He is obese  HENT:      Head: Normocephalic  Eyes:      Conjunctiva/sclera: Conjunctivae normal    Cardiovascular:      Rate and Rhythm: Normal rate  Pulmonary:      Breath sounds: Rales present  Musculoskeletal:      Right lower leg: Edema present  Left lower leg: Edema present  Skin:     General: Skin is warm  Neurological:      Mental Status: He is alert and oriented to person, place, and time  Sensory: Sensory deficit present  Motor: Weakness present     Psychiatric:         Mood and Affect: Mood normal          Additional Data: Labs:  Results from last 7 days   Lab Units 02/19/22  1133 02/17/22  0533 02/16/22  0609   WBC Thousand/uL 7 82   < > 9 07   HEMOGLOBIN g/dL 10 4*   < > 10 1*   HEMATOCRIT % 32 3*   < > 31 4*   PLATELETS Thousands/uL 270   < > 253   NEUTROS PCT %  --   --  63   LYMPHS PCT %  --   --  20   MONOS PCT %  --   --  11   EOS PCT %  --   --  5    < > = values in this interval not displayed  Results from last 7 days   Lab Units 02/19/22  0537 02/16/22  2100 02/16/22  0609   SODIUM mmol/L 137   < > 138   POTASSIUM mmol/L 4 0   < > 2 9*   CHLORIDE mmol/L 101   < > 103   CO2 mmol/L 24   < > 26   BUN mg/dL 22   < > 16   CREATININE mg/dL 0 76   < > 0 70   ANION GAP mmol/L 12   < > 9   CALCIUM mg/dL 8 9   < > 8 0*   ALBUMIN g/dL  --   --  2 9*   TOTAL BILIRUBIN mg/dL  --   --  0 88   ALK PHOS U/L  --   --  65   ALT U/L  --   --  34   AST U/L  --   --  59*   GLUCOSE RANDOM mg/dL 145*   < > 118    < > = values in this interval not displayed  Results from last 7 days   Lab Units 02/15/22  0534   INR  1 12             Results from last 7 days   Lab Units 02/18/22  0455 02/17/22  0533 02/15/22  0530 02/15/22  0133 02/14/22  2243 02/14/22  2242   LACTIC ACID mmol/L  --   --   --  2 0  --  2 4*   PROCALCITONIN ng/ml 0 09 0 09 0 05  --  <0 05  --        Imaging: Reviewed radiology reports from this admission including: ECHO    Recent Cultures (last 7 days):     Results from last 7 days   Lab Units 02/14/22  2250 02/14/22  2244   BLOOD CULTURE  No Growth After 4 Days  No Growth After 4 Days         Lines/Drains:  Invasive Devices  Report    Peripheral Intravenous Line            Peripheral IV 02/19/22 Right;Ventral (anterior) Forearm <1 day          Drain            Urethral Catheter Latex 16 Fr  <1 day                Telemetry:   Telemetry Orders (From admission, onward)             24 Hour Telemetry Monitoring  Continuous x 24 Hours (Telem)        Expiring   References:    Telemetry Guidelines   Question:  Reason for 24 Hour Telemetry  Answer:  Pre or Post EP Study/PCI                    Last 24 Hours Medication List:   Current Facility-Administered Medications   Medication Dose Route Frequency Provider Last Rate    albuterol  2 puff Inhalation Q4H PRN Sylvia Howell DO      ammonium lactate   Topical BID PRN Abby Ferrera MD      aspirin  81 mg Oral Daily Jaiden Dugan MD      atorvastatin  40 mg Oral Daily With Александр Kwong MD      clopidogrel  75 mg Oral Daily Jaiden Dugan MD      docusate sodium  100 mg Oral BID Jaiden Dugan MD      ferrous sulfate  325 mg Oral Every Other Day Annie Wong,       furosemide  40 mg Intravenous Once Kasey Mckinley MD      insulin lispro  1-6 Units Subcutaneous TID AC Isabelle Evans MD      insulin lispro  1-6 Units Subcutaneous HS Abby Ferrera MD      [START ON 2/20/2022] lisinopril  5 mg Oral Daily Kasey Mckinley MD      metoprolol tartrate  25 mg Oral [facility] Kasey Mckinley MD      nitroglycerin  0 4 mg Sublingual Q5 Min PRN Minnie Rosales,       ondansetron  4 mg Intravenous Q6H PRN Minnie Rosales, DO      polyethylene glycol  17 g Oral Daily PRN Jaiden Dugan MD      senna  6 tablet Oral BID PRN Abby Ferrera MD          Today, Patient Was Seen By: Mili Calvo MD    ** Please Note: This note has been constructed using a voice recognition system   **

## 2022-02-20 ENCOUNTER — APPOINTMENT (INPATIENT)
Dept: RADIOLOGY | Facility: HOSPITAL | Age: 72
DRG: 246 | End: 2022-02-20
Payer: MEDICARE

## 2022-02-20 VITALS
HEART RATE: 98 BPM | HEIGHT: 64 IN | BODY MASS INDEX: 29.53 KG/M2 | DIASTOLIC BLOOD PRESSURE: 59 MMHG | RESPIRATION RATE: 17 BRPM | TEMPERATURE: 98.1 F | SYSTOLIC BLOOD PRESSURE: 99 MMHG | WEIGHT: 173 LBS | OXYGEN SATURATION: 92 %

## 2022-02-20 LAB
ANION GAP SERPL CALCULATED.3IONS-SCNC: 6 MMOL/L (ref 4–13)
BACTERIA BLD CULT: NORMAL
BACTERIA BLD CULT: NORMAL
BUN SERPL-MCNC: 15 MG/DL (ref 5–25)
CALCIUM SERPL-MCNC: 8.8 MG/DL (ref 8.3–10.1)
CHLORIDE SERPL-SCNC: 102 MMOL/L (ref 100–108)
CO2 SERPL-SCNC: 26 MMOL/L (ref 21–32)
CREAT SERPL-MCNC: 0.6 MG/DL (ref 0.6–1.3)
GFR SERPL CREATININE-BSD FRML MDRD: 101 ML/MIN/1.73SQ M
GLUCOSE SERPL-MCNC: 102 MG/DL (ref 65–140)
GLUCOSE SERPL-MCNC: 118 MG/DL (ref 65–140)
GLUCOSE SERPL-MCNC: 120 MG/DL (ref 65–140)
GLUCOSE SERPL-MCNC: 134 MG/DL (ref 65–140)
MAGNESIUM SERPL-MCNC: 2.5 MG/DL (ref 1.6–2.6)
PHOSPHATE SERPL-MCNC: 3.2 MG/DL (ref 2.3–4.1)
POTASSIUM SERPL-SCNC: 3.8 MMOL/L (ref 3.5–5.3)
SODIUM SERPL-SCNC: 134 MMOL/L (ref 136–145)

## 2022-02-20 PROCEDURE — 82948 REAGENT STRIP/BLOOD GLUCOSE: CPT

## 2022-02-20 PROCEDURE — 99232 SBSQ HOSP IP/OBS MODERATE 35: CPT | Performed by: INTERNAL MEDICINE

## 2022-02-20 PROCEDURE — 83735 ASSAY OF MAGNESIUM: CPT | Performed by: INTERNAL MEDICINE

## 2022-02-20 PROCEDURE — 99233 SBSQ HOSP IP/OBS HIGH 50: CPT | Performed by: INTERNAL MEDICINE

## 2022-02-20 PROCEDURE — 80048 BASIC METABOLIC PNL TOTAL CA: CPT | Performed by: INTERNAL MEDICINE

## 2022-02-20 PROCEDURE — NC001 PR NO CHARGE: Performed by: INTERNAL MEDICINE

## 2022-02-20 PROCEDURE — 71045 X-RAY EXAM CHEST 1 VIEW: CPT

## 2022-02-20 PROCEDURE — 84100 ASSAY OF PHOSPHORUS: CPT | Performed by: INTERNAL MEDICINE

## 2022-02-20 RX ORDER — FUROSEMIDE 40 MG/1
40 TABLET ORAL DAILY
Status: DISCONTINUED | OUTPATIENT
Start: 2022-02-20 | End: 2022-02-20

## 2022-02-20 RX ORDER — POTASSIUM CHLORIDE 20 MEQ/1
40 TABLET, EXTENDED RELEASE ORAL ONCE
Status: COMPLETED | OUTPATIENT
Start: 2022-02-20 | End: 2022-02-20

## 2022-02-20 RX ORDER — ASPIRIN 81 MG/1
81 TABLET ORAL DAILY
Qty: 30 TABLET | Refills: 0
Start: 2022-02-20 | End: 2022-03-03 | Stop reason: SDUPTHER

## 2022-02-20 RX ORDER — METOPROLOL SUCCINATE 25 MG/1
37.5 TABLET, EXTENDED RELEASE ORAL 2 TIMES DAILY
Qty: 90 TABLET | Refills: 0 | Status: CANCELLED | OUTPATIENT
Start: 2022-02-20

## 2022-02-20 RX ORDER — ATORVASTATIN CALCIUM 40 MG/1
40 TABLET, FILM COATED ORAL
Qty: 30 TABLET | Refills: 3 | Status: SHIPPED | OUTPATIENT
Start: 2022-02-20 | End: 2022-03-03 | Stop reason: SDUPTHER

## 2022-02-20 RX ORDER — FERROUS SULFATE 325(65) MG
325 TABLET ORAL EVERY OTHER DAY
Qty: 30 TABLET | Refills: 0 | Status: SHIPPED | OUTPATIENT
Start: 2022-02-21 | End: 2022-03-03 | Stop reason: SDUPTHER

## 2022-02-20 RX ORDER — METOPROLOL SUCCINATE 25 MG/1
37.5 TABLET, EXTENDED RELEASE ORAL 2 TIMES DAILY
Qty: 90 TABLET | Refills: 1 | Status: SHIPPED | OUTPATIENT
Start: 2022-02-20 | End: 2022-03-03 | Stop reason: SDUPTHER

## 2022-02-20 RX ORDER — FUROSEMIDE 40 MG/1
40 TABLET ORAL DAILY
Status: DISCONTINUED | OUTPATIENT
Start: 2022-02-21 | End: 2022-02-20 | Stop reason: HOSPADM

## 2022-02-20 RX ORDER — CLOPIDOGREL BISULFATE 75 MG/1
75 TABLET ORAL DAILY
Qty: 30 TABLET | Refills: 0 | Status: SHIPPED | OUTPATIENT
Start: 2022-02-20 | End: 2022-03-03 | Stop reason: SDUPTHER

## 2022-02-20 RX ORDER — ALBUTEROL SULFATE 90 UG/1
2 AEROSOL, METERED RESPIRATORY (INHALATION) EVERY 4 HOURS PRN
Qty: 18 G | Refills: 0 | Status: SHIPPED | OUTPATIENT
Start: 2022-02-20

## 2022-02-20 RX ORDER — METOPROLOL SUCCINATE 25 MG/1
37.5 TABLET, EXTENDED RELEASE ORAL 2 TIMES DAILY
Status: DISCONTINUED | OUTPATIENT
Start: 2022-02-20 | End: 2022-02-20

## 2022-02-20 RX ORDER — METOPROLOL SUCCINATE 25 MG/1
37.5 TABLET, EXTENDED RELEASE ORAL 2 TIMES DAILY
Status: DISCONTINUED | OUTPATIENT
Start: 2022-02-20 | End: 2022-02-20 | Stop reason: HOSPADM

## 2022-02-20 RX ORDER — FUROSEMIDE 40 MG/1
40 TABLET ORAL DAILY
Qty: 30 TABLET | Refills: 1 | Status: SHIPPED | OUTPATIENT
Start: 2022-02-21 | End: 2022-03-03 | Stop reason: SDUPTHER

## 2022-02-20 RX ORDER — METFORMIN HYDROCHLORIDE 500 MG/1
500 TABLET, EXTENDED RELEASE ORAL
Qty: 30 TABLET | Refills: 0 | Status: SHIPPED | OUTPATIENT
Start: 2022-02-20 | End: 2022-03-03 | Stop reason: SDUPTHER

## 2022-02-20 RX ORDER — FUROSEMIDE 40 MG/1
40 TABLET ORAL DAILY
Qty: 30 TABLET | Refills: 1 | Status: CANCELLED | OUTPATIENT
Start: 2022-02-20

## 2022-02-20 RX ADMIN — METOPROLOL TARTRATE 25 MG: 25 TABLET, FILM COATED ORAL at 06:41

## 2022-02-20 RX ADMIN — METOPROLOL SUCCINATE 37.5 MG: 25 TABLET, FILM COATED, EXTENDED RELEASE ORAL at 15:18

## 2022-02-20 RX ADMIN — CLOPIDOGREL BISULFATE 75 MG: 75 TABLET ORAL at 09:19

## 2022-02-20 RX ADMIN — HEPARIN SODIUM 5000 UNITS: 5000 INJECTION INTRAVENOUS; SUBCUTANEOUS at 14:12

## 2022-02-20 RX ADMIN — FUROSEMIDE 40 MG: 40 TABLET ORAL at 15:18

## 2022-02-20 RX ADMIN — ATORVASTATIN CALCIUM 40 MG: 40 TABLET, FILM COATED ORAL at 17:43

## 2022-02-20 RX ADMIN — ASPIRIN 81 MG: 81 TABLET, COATED ORAL at 09:19

## 2022-02-20 RX ADMIN — HEPARIN SODIUM 5000 UNITS: 5000 INJECTION INTRAVENOUS; SUBCUTANEOUS at 06:41

## 2022-02-20 RX ADMIN — POTASSIUM CHLORIDE 40 MEQ: 1500 TABLET, EXTENDED RELEASE ORAL at 09:19

## 2022-02-20 RX ADMIN — DOCUSATE SODIUM 100 MG: 100 CAPSULE ORAL at 09:19

## 2022-02-20 NOTE — ASSESSMENT & PLAN NOTE
On prior admission (1/5/22), patient had initial hsTrop elevation to 85, which was deemed non-MI troponin elevation in setting of normal ECG  Developed new chest pain later that admission with new hsTrop elevation to >1600  ECG did not show acute ST changes, so he was sent home on ASA, plavix, statin, BB therapy  Patient was initially discharged with plan for outpatient nuclear stress test     Readmitted this time for chest pain and worsening shortness of breath, likely CHF exacerbation  HsTrop on admission 872 - peak 5732 - 4650  Associated with recent new reduction in LVEF  Transferred to \[facility] [place] [facility]\"" for potential cardiac cath  Plan  - Cardiology consulted, appreciate recs  - Repeat echo revealed EF 20%, a decrease from 40% in January  (severe global hypokinesis, moderate MR, mild TR, mild [place])  - Coronary angiogram revealed severe multivessel coronary artery disease (100% stenosis of ramus, 99% stenosis of D1, 90% stenosis of prox LAD, 90% stenosis of prox Cx, 70% stenosis mid LAD, 100% stenosis of ost RCA to prox RCA)  - Per cardiac surgery, the patient is not a candidate for surgical management due to poor pulmonary status, recommended medical management +/- PCI   -patient had high risk PCI , no complications, currently not having any chest pain    - Continue ASA, plavix, high intensity statin,BB  - Continue telemetry  - Monitor vital signs

## 2022-02-20 NOTE — PROGRESS NOTES
Discussed the indication of LifeVest with patient  Discussed possible risks and outcomes of ventricular arrhythmia including but not limited to death   He verbalized understanding of this but was adamant that he would not want lifevest

## 2022-02-20 NOTE — CASE MANAGEMENT
Received call from [place], [address] [facility]  Titus Pavon noted that the patient had previous agreement with case management to be discharge home with O2 tank for transport, but the oxygen tank that was provided earlier is now noted to be empty  Per note from case management, patient is to be on 1 5 LPM NC  Spoke with patient and patient states "I use 2 LPM at home when moving around, but when still 1 5 LPM is enough"  Clarified with patient that his oxygen concentrator at home is functioning well and will provide him with what he needs regarding oxygen requirements  He states "the empty tank at home is not related to his oxygen concentrator"  Provided patient with one of our oxygen tanks in order to prevent delay of discharge home with it being after hours and no case management available  Provided education to patient and wife with Titus Pavon RN present on how to use our oxygen regulator to turn tank on to 2 LPM, as it was different than the regulator on the tank that was left by case management  Patient and wife verbalized understanding and patient was very knowledgeable of different parts of oxygen tank and regulator and able to teach back while demonstrating how to turn oxygen tank on and to the proper LPM  Discharge instructions reviewed with patient by Titus Pavon RN  Patient transferred to wheelchair successfully with no incident  Patient transported down to vehicle for discharge by aide from floor
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
## 2022-02-20 NOTE — PROGRESS NOTES
Patient was ordered medications with hold parameters for SBP to be greater than 110 but the current BP 99/55  Reached out to ordering provider Dr Nic Martinez for direction regarding medications  Received verbal order to adjust hold parameters

## 2022-02-20 NOTE — CASE MANAGEMENT
Case Management Progress Note    Patient name Ashutosh Britton  Location [address] [facility] [address] 070-74 MRN [id number]  : 1950 Date 2022       LOS (days): 5  Geometric Mean LOS (GMLOS) (days): 4 50  Days to GMLOS:-0 4        OBJECTIVE:        Current admission status: Inpatient  Preferred Pharmacy:   [address] [facility] [address]  [address] [facility] [address]  Phone: [phone] Fax: [phone]    Primary Care Provider: No primary care provider on file  Primary Insurance: MEDICARE  Secondary Insurance:     PROGRESS NOTE:  Spoke with patient about his dc plan  Patient states that he needs a oxygen tank to dc to home  Patient has home concentrator and states tank at home is empty and he has not been able to get a new one from [facility]  Spoke with Randa at [facility] and she states that I can dispense another tank for dc to home  Barbi Drayton states that she will have need tanks delivered to home  Patient received portable tank  Plan is for patient to dc to home with wife to transport with oxygen at 1 5 liters

## 2022-02-20 NOTE — PROGRESS NOTES
INTERNAL MEDICINE RESIDENCY PROGRESS NOTE     Name: Cece Christianson   Age & Sex: 81 y o  male   MRN: [id number]  Unit/Bed#: [facility] 431-01   Encounter: [id number]  Team: SOD Team B     PATIENT INFORMATION     Name: Cece Christianson   Age & Sex: 81 y o  male   MRN: [id number]  Hospital Stay Days: 5    ASSESSMENT/PLAN     Principal Problem:    Elevated troponin  Active Problems:    Paraplegia ([facility] [address] )    Neurogenic bladder    Acute on chronic systolic congestive heart failure ([facility] [address] )    Acute on chronic respiratory failure with hypoxia (HCC)    Hemoptysis    Type 2 diabetes mellitus (HCC)    Iron deficiency anemia      Iron deficiency anemia  Assessment & Plan  Continue iron supplementation     Type 2 diabetes mellitus ([facility] [address] )  Assessment & Plan  Lab Results   Component Value Date    HGBA1C 6 5 (H) 01/06/2022       Recent Labs     02/19/22  1636 02/19/22  2131 02/20/22  0637 02/20/22  1100   POCGLU 96 115 120 134       Blood Sugar Average: Last 72 hrs:  (P) 116 25 at home controlled on diet  Continue sliding scale    Hemoptysis  Assessment & Plan  Has been occurring for the last month, likely associated with COVID-19 pneumonia  Plan  - Daily CBC  - Continue diuresis as above  - Wean supplemental O2 as tolerated    Acute on chronic respiratory failure with hypoxia (HCC)  Assessment & Plan  See A/P for acute on chronic systolic congestive heart failure  Discharged on 2 L nasal cannula after recent COVID-19 hospitalization  · Patient currently on 1 5 L nasal cannula  · Likely discharge on oxygen, continue monitor closely outpatient and wean as tolerated  · close outpatient pulmonology follow-up    Acute on chronic systolic congestive heart failure [facility])  Assessment & Plan  Wt Readings from Last 3 Encounters:   02/18/22 78 5 kg (173 lb)   02/15/22 83 2 kg (183 lb 6 8 oz)   01/12/22 82 7 kg (182 lb 5 1 oz)     Patient presents with worsening chest pain and dyspnea  BNP 1400   CTA PE showed no PE, bilateral groundglass airspace disease in a mostly perihilar distribution likely secondary to CHF (although component of residual COVID pneumonia not excluded), moderate bilateral pleural effusions  Developed hypotension after diuresis prompting slower diuresis with lasix gtt and BP monitoring  Has been hemodynamically stable, so transfer to [facility] for potential cath  Plan  - Pulm consulted regarding bilateral pulmonary effusions, they determined it is not necessary to perform thoracentesis   - transition to p o  Lasix 40 mg daily  - initiated on metoprolol extended 3 75 mg b i d   - further GDMT limited by BP  - Monitor vital signs  - Daily weights  - Strict I/O  - Wean supplemental O2 as tolerated  - Fluid restriction 1800 cc and 2g sodium diet   - See "Elevated Troponin" for further plan    Neurogenic bladder  Assessment & Plan  Intermittent straight catheterization at home secondary to paraplegia  · Continue with Silver catheter while inpatient  · Strict intake and output  · Monitor creatinine with a m  BMP    Paraplegia (HCC)  Assessment & Plan  Baseline paraplegia  - No intervention     * Elevated troponin  Assessment & Plan  On prior admission (1/5/22), patient had initial hsTrop elevation to 85, which was deemed non-MI troponin elevation in setting of normal ECG  Developed new chest pain later that admission with new hsTrop elevation to >1600  ECG did not show acute ST changes, so he was sent home on ASA, plavix, statin, BB therapy  Patient was initially discharged with plan for outpatient nuclear stress test     Readmitted this time for chest pain and worsening shortness of breath, likely CHF exacerbation  HsTrop on admission 872 - peak 5732 - 8760  Associated with recent new reduction in LVEF  Transferred to [facility] for potential cardiac cath  Plan  - Cardiology consulted, appreciate recs  - Repeat echo revealed EF 20%, a decrease from 40% in January   (severe global hypokinesis, moderate MR, mild TR, mild HI)  - Coronary angiogram revealed severe multivessel coronary artery disease (100% stenosis of ramus, 99% stenosis of D1, 90% stenosis of prox LAD, 90% stenosis of prox Cx, 70% stenosis mid LAD, 100% stenosis of ost RCA to prox RCA)  - Per cardiac surgery, the patient is not a candidate for surgical management due to poor pulmonary status, recommended medical management +/- PCI   -patient had high risk PCI , no complications, currently not having any chest pain  - Continue ASA, plavix, high intensity statin,BB  - Continue telemetry  - Monitor vital signs        Disposition:  Continue with inpatient management as highlighted above  Anticipate discharge within the next 24-48 hours     SUBJECTIVE     Patient seen and examined  No acute events overnight  Patient is very eager to go home  States his shortness of breath has been stable, not worsening  Has had another episode of hemoptysis x1  Denies any nausea, vomiting, abdominal pain  All other review of systems negative at this time  OBJECTIVE     Vitals:    22 0237 22 4005 22 0735 22 1157   BP: 129/86 95/63 99/63 99/63   BP Location: Right arm      Pulse: 99 95 (!) 112 98   Resp:    Temp: 98 5 °F (36 9 °C) 98 2 °F (36 8 °C)  98 5 °F (36 9 °C)   TempSrc: Oral      SpO2: 95% 94% 92% 97%   Weight:       Height:          Temperature:   Temp (24hrs), Av 3 °F (36 8 °C), Min:98 °F (36 7 °C), Max:98 6 °F (37 °C)    Temperature: 98 5 °F (36 9 °C)  Intake & Output:  I/O        07 07 0701   07 07 0700    P  O  594 75     I V  (mL/kg) 256 (3 3) 300 (3 8)     Total Intake(mL/kg) 850 (10 8) 375 (4 8)     Urine (mL/kg/hr) 3015 (1 6) 900 (0 5) 300 (0 8)    Total Output 3015 900 300    Net -2165 -525 -300               Weights:   IBW (Ideal Body Weight): 59 2 kg    Body mass index is 29 7 kg/m²  Weight (last 2 days)     Date/Time Weight    22 0600 78 5 (173)        Physical Exam  Vitals reviewed  Constitutional:       General: He is not in acute distress  Appearance: He is well-developed  He is not diaphoretic  HENT:      Head: Normocephalic and atraumatic  Mouth/Throat:      Pharynx: No oropharyngeal exudate  Eyes:      General: No scleral icterus  Extraocular Movements: Extraocular movements intact  Conjunctiva/sclera: Conjunctivae normal    Neck:      Vascular: No JVD  Trachea: No tracheal deviation  Cardiovascular:      Rate and Rhythm: Normal rate and regular rhythm  Heart sounds: No murmur heard  No friction rub  No gallop  Pulmonary:      Effort: Pulmonary effort is normal  No respiratory distress  Breath sounds: No stridor  No wheezing  Comments: Satting 94% on 1 5 L nasal cannula  Crackles appreciated bilateral lung bases  Abdominal:      General: There is no distension  Palpations: Abdomen is soft  There is no mass  Tenderness: There is no abdominal tenderness  There is no right CVA tenderness or left CVA tenderness  Genitourinary:     Comments: Silver catheter in place draining yellow urine  Musculoskeletal:         General: No tenderness  Right lower leg: No edema  Left lower leg: No edema  Comments: Bilateral lower extremity paraplegia  3+ pedal edema bilaterally, chronic  Pretibial edema improving   Skin:     General: Skin is warm and dry  Coloration: Skin is not pale  Findings: No erythema  Neurological:      Mental Status: He is alert and oriented to person, place, and time  Psychiatric:         Behavior: Behavior normal          Thought Content: Thought content normal        LABORATORY DATA     Labs: I have personally reviewed pertinent reports    Results from last 7 days   Lab Units 02/19/22  1133 02/18/22  0455 02/17/22  0533 02/16/22  0609 02/16/22  0609 02/15/22  0530 02/15/22  0530 02/14/22  2242 02/14/22  2242   WBC Thousand/uL 7 82 8 37 8 70   < > 9 07   < > 11 75*   < > 8 56   HEMOGLOBIN g/dL 10 4* 9 8* 10 3*   < > 10 1*   < > 11 0*   < > 12 0   HEMATOCRIT % 32 3* 30 0* 31 8*   < > 31 4*   < > 34 3*   < > 37 4   PLATELETS Thousands/uL 270 252 273   < > 253   < > 281  281   < > 285   NEUTROS PCT %  --   --   --   --  63  --  76*  --  90*   MONOS PCT %  --   --   --   --  11  --  10  --  4    < > = values in this interval not displayed  Results from last 7 days   Lab Units 02/20/22  0509 02/19/22  0537 02/18/22  0455 02/16/22  2100 02/16/22  0609 02/14/22  2243 02/14/22  2243   POTASSIUM mmol/L 3 8 4 0 3 7   < > 2 9*   < > 4 0   CHLORIDE mmol/L 102 101 100   < > 103   < > 106   CO2 mmol/L 26 24 26   < > 26   < > 23   BUN mg/dL 15 22 29*   < > 16   < > 21   CREATININE mg/dL 0 60 0 76 0 66   < > 0 70   < > 0 85   CALCIUM mg/dL 8 8 8 9 9 1   < > 8 0*   < > 8 5   ALK PHOS U/L  --   --   --   --  65  --  87   ALT U/L  --   --   --   --  34  --  44   AST U/L  --   --   --   --  59*  --  50*    < > = values in this interval not displayed  Results from last 7 days   Lab Units 02/20/22  0509 02/19/22  1638 02/19/22  0537   MAGNESIUM mg/dL 2 5 2 5 2 4     Results from last 7 days   Lab Units 02/20/22  0509 02/19/22  1638 02/19/22  0537   PHOSPHORUS mg/dL 3 2 3 2 4 0      Results from last 7 days   Lab Units 02/19/22  0000 02/18/22  0455 02/17/22  1153 02/15/22  1228 02/15/22  0534 02/14/22  2244 02/14/22 2244   INR   --   --   --   --  1 12  --  1 16   PTT seconds 39* 70* 62*   < > 37   < > 39*    < > = values in this interval not displayed  Results from last 7 days   Lab Units 02/15/22  0133   LACTIC ACID mmol/L 2 0           IMAGING & DIAGNOSTIC TESTING     Radiology Results: I have personally reviewed pertinent reports    XR chest portable    Result Date: 2/20/2022  Impression: Persistent slight increased bilateral alveolar edema and or superimposed infiltrates Small pleural effusions Workstation performed: [id number]     XR chest portable    Result Date: 2/17/2022  Impression: Stable bilateral alveolar infiltrates and pleural effusions when compared to the most recent CT pulmonary angiogram   Differential continues to remain alveolar edema versus multifocal/multi lobar pneumonia  Correlate for Covid type pneumonia  Workstation performed: [id number]     CTA ED chest PE study    Result Date: 2/15/2022  Impression: No evidence of pulmonary embolus  Bilateral groundglass airspace disease in a mostly perihilar distribution likely secondary to CHF although component of residual COVID pneumonia not excluded  Moderate bilateral pleural effusions Workstation performed: [id number]     Other Diagnostic Testing: I have personally reviewed pertinent reports      ACTIVE MEDICATIONS     Current Facility-Administered Medications   Medication Dose Route Frequency    albuterol (PROVENTIL HFA,VENTOLIN HFA) inhaler 2 puff  2 puff Inhalation Q4H PRN    ammonium lactate (LAC-HYDRIN) 12 % cream   Topical BID PRN    aspirin (ECOTRIN LOW STRENGTH) EC tablet 81 mg  81 mg Oral Daily    atorvastatin (LIPITOR) tablet 40 mg  40 mg Oral Daily With Dinner    clopidogrel (PLAVIX) tablet 75 mg  75 mg Oral Daily    docusate sodium (COLACE) capsule 100 mg  100 mg Oral BID    ferrous sulfate tablet 325 mg  325 mg Oral Every Other Day    furosemide (LASIX) tablet 40 mg  40 mg Oral Daily    heparin (porcine) subcutaneous injection 5,000 Units  5,000 Units Subcutaneous Q8H [address]    insulin lispro (HumaLOG) 100 units/mL subcutaneous injection 1-6 Units  1-6 Units Subcutaneous TID AC    insulin lispro (HumaLOG) 100 units/mL subcutaneous injection 1-6 Units  1-6 Units Subcutaneous HS    metoprolol succinate (TOPROL-XL) 24 hr tablet 37 5 mg  37 5 mg Oral BID    nitroglycerin (NITROSTAT) SL tablet 0 4 mg  0 4 mg Sublingual Q5 Min PRN    ondansetron (ZOFRAN) injection 4 mg  4 mg Intravenous Q6H PRN    polyethylene glycol (MIRALAX) packet 17 g  17 g Oral Daily PRN    senna (SENOKOT) tablet 51 6 mg  6 tablet Oral BID PRN       VTE Pharmacologic Prophylaxis: Heparin  VTE Mechanical Prophylaxis: sequential compression device    Portions of the record may have been created with voice recognition software  Occasional wrong word or "sound a like" substitutions may have occurred due to the inherent limitations of voice recognition software    Read the chart carefully and recognize, using context, where substitutions have occurred   ==  Luis Carlos Contreras, [facility]  Internal Medicine Residency PGY-3

## 2022-02-20 NOTE — ASSESSMENT & PLAN NOTE
Intermittent straight catheterization at home secondary to paraplegia  · Continue with Silver catheter while inpatient  · Strict intake and output  · Monitor creatinine with a m   BMP

## 2022-02-20 NOTE — ASSESSMENT & PLAN NOTE
See A/P for acute on chronic systolic congestive heart failure  Discharged on 2 L nasal cannula after recent COVID-19 hospitalization    · Patient currently on 1 5 L nasal cannula  · Likely discharge on oxygen, continue monitor closely outpatient and wean as tolerated  · close outpatient pulmonology follow-up

## 2022-02-20 NOTE — DISCHARGE INSTRUCTIONS
IT IS VERY IMPORTANT THAT YOU TAKE BOTH ASPIRIN 81 mg DAILY and PLAVIX 75 mg daily for the stents placed  You were also diagnose with type 2 diabetes  I prescribed you metformin  mg daily for diabetes  You were also started on oral lasix 40 mg daily  Please monitor your blood pressure daily at home (keep log and bring to next visit with primary care doctor and cardiologist)  Please call cardiology office if you start experiencing any worsening shortness of breath or chest pain  You were provided referral to urology to follow up for your palacio catheter care  It is recommended you continue with intermittent straight catheterization at home, if you continue w/ chronic palacio- it is very important you follow up with urology  Please complete repeat lab work to monitor your kidney function and electrolytes in 1 week  After Coronary Angioplasty and Intravascular Stent Placement   AMBULATORY CARE:   After coronary angioplasty and intravascular stent placement,  you will need to move carefully for 48 hours  Your healthcare provider will give you specific activity instructions to follow while you heal  You will also need to care for your procedure wound to prevent infection and help it heal   Call 911 for any of the following:   · You have any of the following signs of a heart attack:      ? Squeezing, pressure, or pain in your chest    ? You may  also have any of the following:     § Discomfort or pain in your back, neck, jaw, stomach, or arm    § Shortness of breath    § Nausea or vomiting    § Lightheadedness or a sudden cold sweat    · You have any of the following signs of a stroke:      ? Numbness or drooping on one side of your face     ? Weakness in an arm or leg    ? Confusion or difficulty speaking    ? Dizziness, a severe headache, or vision loss    · You cough up blood  Seek care immediately if:   · Your arm or leg feels warm, tender, and painful  It may look swollen and red      · Your leg or arm becomes numb, or your fingers or toes turn white or blue  · The area where the catheter was placed is swollen, red, or has pus or foul-smelling fluid coming from it  · You start to bleed from your catheter site again  Contact your cardiologist if:   · You have a fever or chills  · You have questions or concerns about your condition or care  Medicines: You may be given any of the following:  · Antiplatelets  prevent blood clots from forming  You will need to take aspirin and another type of antiplatelet medicine  Take this medicine daily as directed  Do not stop taking aspirin or other type of antiplatelet medicine without asking your healthcare provider  · Cholesterol medicine  helps decrease the amount of cholesterol in your blood  Too much cholesterol in your blood may cause plaque buildup  · Blood pressure medicine  lowers your blood pressure  · Take your medicine as directed  Contact your healthcare provider if you think your medicine is not helping or if you have side effects  Tell him or her if you are allergic to any medicine  Keep a list of the medicines, vitamins, and herbs you take  Include the amounts, and when and why you take them  Bring the list or the pill bottles to follow-up visits  Carry your medicine list with you in case of an emergency  Drink liquids as directed:  Drink extra liquids if contrast liquid was used during your procedure  Liquid will help flush the contrast out of your body  Ask your healthcare provider how much liquid to drink each day and which liquids are best for you  Activity:   · Rest for 1 or 2 days after your procedure  If you had a heart attack, you may need to rest longer  · Increase activity slowly until you reach your normal level of activity  Limits after groin insertion: The following will reduce pressure on your catheter site and prevent bleeding:  · Do not lift more than 10 pounds for 1 week      · Do not strain to have a bowel movement  · Avoid intense exercise for 2 to 4 weeks  · If you need to cough, support the catheter site area with your hand  · Ask your healthcare provider how long these limits should last     Limits after wrist insertion: The following will reduce pressure on your catheter site and prevent bleeding:  · Do not use your wrist to lift more than 2 pounds  · Avoid activities that use your wrist, such as tennis, bowling, and golf  · Do not push or pull items  · If you need to cough, support the catheter site area with your hand  · Ask your healthcare provider how long these limits should last     Wound care: Most bandages can be removed the day after your procedure  Gently clean the catheter site with soap and water daily  Do not rub it  Do not  soak in a tub, swimming pool, or hot tub until your healthcare provider says it is okay  Do not smoke:  Nicotine and other chemicals in cigarettes and cigars can cause heart damage  Ask your healthcare provider for information if you currently smoke and need help to quit  E-cigarettes or smokeless tobacco still contain nicotine  Talk to your healthcare provider before you use these products  Cardiac rehab:  Your cardiologist may recommend that you attend cardiac rehabilitation (rehab)  This is a program run by specialists who will help you safely strengthen your heart and reduce the risk for more heart disease  The plan includes exercise, relaxation, stress management, and heart-healthy nutrition  Healthcare providers will also check to make sure any medicines you are taking are working  Follow up with your cardiologist as directed: You may need more tests  If you need an MRI, wait at least 6 to 8 weeks after stent placement, or as directed  Write down your questions so you remember to ask them during your visits    © Copyright [employer] 2021 Information is for End User's use only and may not be sold, redistributed or otherwise used for commercial purposes  All illustrations and images included in CareNotes® are the copyrighted property of A [employer] M [employer] Inc  or Carolynn De Oliveira  The above information is an  only  It is not intended as medical advice for individual conditions or treatments  Talk to your doctor, nurse or pharmacist before following any medical regimen to see if it is safe and effective for you

## 2022-02-20 NOTE — ASSESSMENT & PLAN NOTE
Lab Results   Component Value Date    HGBA1C 6 5 (H) 01/06/2022       Recent Labs     02/19/22  1636 02/19/22  2131 02/20/22  0637 02/20/22  1100   POCGLU 96 115 120 134       Blood Sugar Average: Last 72 hrs:  (P) 116 25 at home controlled on diet  Continue sliding scale

## 2022-02-20 NOTE — PLAN OF CARE
Problem: Potential for Falls  Goal: Patient will remain free of falls  Description: INTERVENTIONS:  - Educate patient/family on patient safety including physical limitations  - Instruct patient to call for assistance with activity   - Consult OT/PT to assist with strengthening/mobility   - Keep Call bell within reach  - Keep bed low and locked with side rails adjusted as appropriate  - Keep care items and personal belongings within reach  - Initiate and maintain comfort rounds  - Make Fall Risk Sign visible to staff  - Offer Toileting every 2 Hours, in advance of need  - Obtain necessary fall risk management equipment:   - Apply yellow socks and bracelet for high fall risk patients  - Consider moving patient to room near nurses station  Outcome: Progressing     Problem: CARDIOVASCULAR - ADULT  Goal: Absence of cardiac dysrhythmias or at baseline rhythm  Description: INTERVENTIONS:  - Continuous cardiac monitoring, vital signs, obtain 12 lead EKG if ordered  - Administer antiarrhythmic and heart rate control medications as ordered  - Monitor electrolytes and administer replacement therapy as ordered  Outcome: Progressing

## 2022-02-20 NOTE — PROGRESS NOTES
Patient ordered to be discharged and had O2 tank delivered earlier in day by  [employer]  Messaged both clinical coordinator Alvarado Parikh and previously mentioned   Coordinator came to the bedside and provided patient with in house tank and educated patient on the proper use  Patient verbalized and demonstrated understanding  Empty tank was placed at the case management desk

## 2022-02-21 ENCOUNTER — PATIENT OUTREACH (OUTPATIENT)
Dept: CASE MANAGEMENT | Facility: OTHER | Age: 72
End: 2022-02-21

## 2022-02-21 LAB
ATRIAL RATE: 107 BPM
ATRIAL RATE: 120 BPM
P AXIS: 39 DEGREES
P AXIS: 43 DEGREES
PR INTERVAL: 156 MS
PR INTERVAL: 162 MS
QRS AXIS: -11 DEGREES
QRS AXIS: 56 DEGREES
QRSD INTERVAL: 100 MS
QRSD INTERVAL: 94 MS
QT INTERVAL: 322 MS
QT INTERVAL: 358 MS
QTC INTERVAL: 455 MS
QTC INTERVAL: 477 MS
T WAVE AXIS: -20 DEGREES
T WAVE AXIS: 96 DEGREES
VENTRICULAR RATE: 107 BPM
VENTRICULAR RATE: 120 BPM

## 2022-02-21 PROCEDURE — 93010 ELECTROCARDIOGRAM REPORT: CPT | Performed by: INTERNAL MEDICINE

## 2022-02-21 NOTE — PROGRESS NOTES
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

## 2022-02-22 ENCOUNTER — EPISODE CHANGES (OUTPATIENT)
Dept: CASE MANAGEMENT | Facility: OTHER | Age: 72
End: 2022-02-22

## 2022-02-22 NOTE — PHYSICIAN ADVISOR
Current patient class: Inpatient  The patient is currently on Hospital Day: 2 at [address]      The patient was admitted to the hospital at  1:35 AM on 2/15/22 for the following diagnosis:  SOB (shortness of breath) [R06 02]  Elevated troponin [R77 8]  CHF exacerbation (HCC) [I50 9]  Bilateral pleural effusion [J90]  Acute on chronic respiratory failure with hypoxia ([address] ) [J96 21]       There was documentation in the medical record of an expected length of stay of at least 2 midnights  The patient was therefore expected to satisfy the 2 midnight benchmark and given the 2 midnight presumption was appropriate for INPATIENT ADMISSION  Given this expectation of a satisfying stay, CMS instructs us that the patient is most often appropriate for inpatient admission under part A provided medical necessity is documented in the chart  After review of the relevant documentation, labs, vital signs and test results, the patient is appropriate for INPATIENT ADMISSION  Admission to the hospital as an inpatient is a complex decision making process which requires the practitioner to consider the patients presenting complaint, history and physical examination and all relevant testing  With this in mind, in this case, the patient was deemed appropriate for INPATIENT ADMISSION  After review of the documentation and testing available at the time of the admission, I concur with this clinical determination of medical necessity  For the reason noted, the patient was discharged before reaching 2 midnights as an inpatient  Rationale is as follows: The patient is a 81 yrs old Male who presented to the ED at 2/14/2022 10:07 PM with a chief complaint of Shortness of Breath (SOB began 5/6p, took prednisone approx 4p )  Patient came with acute CHF exacerbation  The patient was put on IV diuretics and came with acute hypoxemic respiratory failure in the setting of acute CHF    Patient was found to have a new cardiomyopathy and given his symptoms and acute hypoxemia with cardiomyopathy the patient was recommended transfer to [facility] for cardiology evaluation and likely left heart catheterization  Given the patient's presenting complaint he was recently placed in inpatient status with expectation of greater than 2 midnights but he was transferred the following day for higher level of care  Patient is inpatient appropriate with an interfacility transfer  The patients vitals on arrival were   ED Triage Vitals   Temperature Pulse Respirations Blood Pressure SpO2   02/14/22 2217 02/14/22 2209 02/14/22 2209 02/14/22 2209 02/14/22 2209   99 5 °F (37 5 °C) (!) 137 (!) 28 133/85 97 %      Temp Source Heart Rate Source Patient Position - Orthostatic VS BP Location FiO2 (%)   02/14/22 2217 02/14/22 2209 02/14/22 2209 02/14/22 2209 --   Tympanic Monitor Sitting Left arm       Pain Score       02/14/22 2209       5           Past Medical History:   Diagnosis Date    CHF (congestive heart failure) ([address] )     COVID     Paraplegia (HCC)     x30 years     Past Surgical History:   Procedure Laterality Date    BACK SURGERY      CARDIAC CATHETERIZATION N/A 2/16/2022    Procedure: CARDIAC CORONARY ANGIOGRAM;  Surgeon: Galilea Almaraz MD;  [facility]: [facility]; Service: Cardiology    CARDIAC CATHETERIZATION Left 2/16/2022    Procedure: Cardiac Left Heart Cath;  Surgeon: Galilea Almaraz MD;  [facility]; Service: Cardiology    CARDIAC CATHETERIZATION N/A 2/18/2022    Procedure: Cardiac pci;  Surgeon: Galilea Almaraz MD;  [facility]: [facility];   Service: Cardiology           Consults have been placed to:   IP CONSULT TO PULMONOLOGY    Vitals:    02/15/22 1300 02/15/22 1400 02/15/22 1444 02/15/22 1500   BP: 109/69 110/59  117/73   BP Location: Left arm Left arm  Left arm   Pulse: (!) 110 (!) 115  (!) 117   Resp: (!) 29 (!) 38  (!) 30   Temp:       TempSrc:       SpO2: 92% 96% 95% 93%   Weight:       Height:           Most recent labs:    Recent Labs     02/19/22  1133 02/20/22  0509   WBC 7 82  --    HGB 10 4*  --    HCT 32 3*  --      --    K  --  3 8   CALCIUM  --  8 8   BUN  --  15   CREATININE  --  0 60       Scheduled Meds:  Continuous Infusions:No current facility-administered medications for this encounter  PRN Meds:      Surgical procedures (if appropriate):

## 2022-02-22 NOTE — DISCHARGE SUMMARY
INTERNAL MEDICINE RESIDENCY DISCHARGE SUMMARY     Sheila Hayes   81 y o  male  MRN: [id number]  Room/Bed: [facility] [address] [place] [address]    Encounter: [id number]    Principal Problem:    Elevated troponin  Active Problems:    Paraplegia [facility])    Neurogenic bladder    Acute on chronic systolic congestive heart failure (HCC)    Acute on chronic respiratory failure with hypoxia (HCC)    Hemoptysis    Type 2 diabetes mellitus (HCC)    Iron deficiency anemia      Iron deficiency anemia  Assessment & Plan  Continue iron supplementation     Type 2 diabetes mellitus [facility])  Assessment & Plan  Lab Results   Component Value Date    HGBA1C 6 5 (H) 01/06/2022       Recent Labs     02/19/22  1636 02/19/22  2131 02/20/22  0637 02/20/22  1100   POCGLU 96 115 120 134       Blood Sugar Average: Last 72 hrs:  (P) 116 25 at home controlled on diet  Continue sliding scale    Hemoptysis  Assessment & Plan  Has been occurring for the last month, likely associated with COVID-19 pneumonia  Plan  - Daily CBC  - Continue diuresis as above  - Wean supplemental O2 as tolerated    Acute on chronic respiratory failure with hypoxia (HCC)  Assessment & Plan  See A/P for acute on chronic systolic congestive heart failure  Discharged on 2 L nasal cannula after recent COVID-19 hospitalization  · Patient currently on 1 5 L nasal cannula  · Likely discharge on oxygen, continue monitor closely outpatient and wean as tolerated  · close outpatient pulmonology follow-up    Acute on chronic systolic congestive heart failure [facility])  Assessment & Plan  Wt Readings from Last 3 Encounters:   02/18/22 78 5 kg (173 lb)   02/15/22 83 2 kg (183 lb 6 8 oz)   01/12/22 82 7 kg (182 lb 5 1 oz)     Patient presents with worsening chest pain and dyspnea  BNP 1400   CTA PE showed no PE, bilateral groundglass airspace disease in a mostly perihilar distribution likely secondary to CHF (although component of residual COVID pneumonia not excluded), moderate bilateral pleural effusions  Developed hypotension after diuresis prompting slower diuresis with lasix gtt and BP monitoring  Has been hemodynamically stable, so transfer to [facility] for potential cath  Plan  - Pulm consulted regarding bilateral pulmonary effusions, they determined it is not necessary to perform thoracentesis   - transition to p o  Lasix 40 mg daily  - initiated on metoprolol extended 3 75 mg b i d   - further GDMT limited by BP  - Monitor vital signs  - Daily weights  - Strict I/O  - Wean supplemental O2 as tolerated  - Fluid restriction 1800 cc and 2g sodium diet   - See "Elevated Troponin" for further plan    Neurogenic bladder  Assessment & Plan  Intermittent straight catheterization at home secondary to paraplegia  · Continue with Silver catheter while inpatient  · Strict intake and output  · Monitor creatinine with a m  BMP    Paraplegia (HCC)  Assessment & Plan  Baseline paraplegia  - No intervention     * Elevated troponin  Assessment & Plan  On prior admission (1/5/22), patient had initial hsTrop elevation to 85, which was deemed non-MI troponin elevation in setting of normal ECG  Developed new chest pain later that admission with new hsTrop elevation to >1600  ECG did not show acute ST changes, so he was sent home on ASA, plavix, statin, BB therapy  Patient was initially discharged with plan for outpatient nuclear stress test     Readmitted this time for chest pain and worsening shortness of breath, likely CHF exacerbation  HsTrop on admission 872 - peak 5732 - 4650  Associated with recent new reduction in LVEF  Transferred to [facility] for potential cardiac cath  Plan  - Cardiology consulted, appreciate recs  - Repeat echo revealed EF 20%, a decrease from 40% in January   (severe global hypokinesis, moderate MR, mild TR, mild OK)  - Coronary angiogram revealed severe multivessel coronary artery disease (100% stenosis of ramus, 99% stenosis of D1, 90% stenosis of prox LAD, 90% stenosis of prox Cx, 70% stenosis mid LAD, 100% stenosis of ost RCA to prox RCA)  - Per cardiac surgery, the patient is not a candidate for surgical management due to poor pulmonary status, recommended medical management +/- PCI   -patient had high risk PCI , no complications, currently not having any chest pain  - Continue ASA, plavix, high intensity statin,BB  - Continue telemetry  - Monitor vital signs          DETAILS OF HOSPITAL COURSE     H and P per Dr Rob Kate "Mr Neva Miranda is a 72-year-old male with a past medical history of paraplegia, neurogenic bladder (patient performs his own straight-caths at home), CHF EF 40%, recent COVID infection last month requiring hospitalization  Patient was recently discharged from [facility] on 01/12/2022 from [facility] at that time was found to have an ejection fraction of 40%  Nuclear stress test was recommended as outpatient and patient was discharged home with 2 L nasal cannula  Patient states that approximately 1 week ago he began to have worsening of shortness of breath  Began to experience orthopnea any started wearing his supplemental oxygen at 5 L nasal cannula  He notes that if his oxygen saturation drops into the 80s, he would experience chest discomfort describes as a squeezing or tightening  When his oxygen levels improved discomfort go away  He has chronic lymphedema but states he noticed worsening of this as well  This is particularly true for his left leg  In addition, he has been coughing, and sometimes he was having hemoptysis  He reported the symptoms to his pulmonologist on 02/11 during a telemedicine visit  A chest x-ray was ordered which showed persistent findings related to COVID-19  Prednisone was ordered for the patient of which she took 2 doses but then later came to the ER for worsening symptoms  Upon admission he had a CTA which revealed moderate bilateral pleural effusions    His BNP was elevated at 14 68 which is increased compared to prior  He was given Lasix 40 mg IV x1 dose with good urinary output although he did become hypotensive  His high sensitivity troponin levels were 872, 1826, 2747, 4425, 5007 are 32  He had EKG performed which showed ST/T-wave changes in inferior and lateral leads  He was started on heparin drip and was recommended by Cardiology to be transferred for ischemic evaluation at [place] [id number]      Upon arrival at [facility], the patient was seen and examined by myself along with senior resident  Patient states that he feels generally well and improved since he was admitted  He presently denies any chest pain, states that he has not had any chest pain since he has been on supplemental oxygen while in the hospital   He he presently denies any fever, chills, nausea, vomiting, diarrhea, constipation, chest pain, shortness of breath  Upon his arrival I immediately notified Cardiology team   Cardiology team will take patient for heart catheterization tomorrow  I relayed this information to patient, who was agreeable with plan  Patient confirms that he is a level 3 code status  Patient is agreeable to intubation during catheterization procedure if necessary  He states that his primary contact is his wife, Aby Corley, whose contact information is listed in chart"  Patient was transition from Lasix drip to IV Lasix 80 b i d  Evaluated by Cardiology  Underwent cardiac catheterization that revealed triple-vessel disease  Evaluated by Cardiothoracic surgery, unfortunately given underlying respiratory disease and multiple comorbidities, patient was determined to be a poor candidate for CT surgery intervention  Patient then underwent high-risk PCI, 3 stents were placed to LAD  Patient was continued on dual anti-platelet therapy  Received intermittent IV Lasix, transitioned to 40 Lasix p o  Daily    Unfortunately given the borderline hypertension, patient unable to tolerate further goal-directed medical therapy  Repeat echocardiogram did reveal EF 20%, patient refused LifeVest after thorough discussion with Cardiology  Patient discharged on p o  40 Lasix daily, metoprolol succinate 3 75 mg b i d , aspirin 81 mg daily, Plavix 75 mg daily, nitroglycerin p r n  For chest pain  Patient requesting to continue with Palacio catheter on discharge, with plans to remove on 02/21/2022-to continue with intermittent Palacio catheterization as manage previously  Urology consultation placed  Patient is to follow up with Cardiology, pulmonology, Urology outpatient  Patient remained medically stable on day of discharge  Discussed discharge plan in new medications in detail  Patient verbalized understanding        DISCHARGE INFORMATION     PCP at Discharge: Tiara Powell provided    Admitting Provider: Jennine Hatchet, MD  Admission Date: 2/15/2022    Discharge Provider: Elayne att  providers found  Discharge Date: 2/20/2022    Discharge Disposition: Home/Self Care  Discharge Condition: good  Discharge with Lines: palacio catheter  Discharge Diet: regular diet  Activity Restrictions: none  Test Results Pending at Discharge: n/a    Discharge Diagnoses:  Principal Problem:    Elevated troponin  Active Problems:    Paraplegia (HCC)    Neurogenic bladder    Acute on chronic systolic congestive heart failure (HCC)    Acute on chronic respiratory failure with hypoxia (HCC)    Hemoptysis    Type 2 diabetes mellitus ([facility] [address] )    Iron deficiency anemia  Resolved Problems:    * No resolved hospital problems   *      Consulting Providers:    Cardiology  Pulmonology     Diagnostic & Therapeutic Procedures Performed:  XR chest portable    Result Date: 2/20/2022  Impression: Persistent slight increased bilateral alveolar edema and or superimposed infiltrates Small pleural effusions Workstation performed: [id number]     XR chest portable    Result Date: 2/17/2022  Impression: Stable bilateral alveolar infiltrates and pleural effusions when compared to the most recent CT pulmonary angiogram   Differential continues to remain alveolar edema versus multifocal/multi lobar pneumonia  Correlate for Covid type pneumonia  Workstation performed: [id number]     CTA ED chest PE study    Result Date: 2/15/2022  Impression: No evidence of pulmonary embolus  Bilateral groundglass airspace disease in a mostly perihilar distribution likely secondary to CHF although component of residual COVID pneumonia not excluded   Moderate bilateral pleural effusions Workstation performed: [id number]       Code Status: Prior  Advance Directive & Living Will: <no information>  Power of :    POLST:      Medications:  Discharge Medication List as of 2/20/2022  5:36 PM      STOP taking these medications       metoprolol tartrate (LOPRESSOR) 25 mg tablet Comments:   Reason for Stopping:         predniSONE 20 mg tablet Comments:   Reason for Stopping:             Discharge Medication List as of 2/20/2022  5:36 PM      START taking these medications    Details   albuterol (PROVENTIL HFA,VENTOLIN HFA) 90 mcg/act inhaler Inhale 2 puffs every 4 (four) hours as needed for wheezing, Starting Sun 2/20/2022, Normal      atorvastatin (LIPITOR) 40 mg tablet Take 1 tablet (40 mg total) by mouth daily with dinner, Starting Sun 2/20/2022, Normal      ferrous sulfate 325 (65 Fe) mg tablet Take 1 tablet (325 mg total) by mouth every other day, Starting Mon 2/21/2022, Normal      furosemide (LASIX) 40 mg tablet Take 1 tablet (40 mg total) by mouth daily, Starting Mon 2/21/2022, Normal      metFORMIN (GLUCOPHAGE-XR) 500 mg 24 hr tablet Take 1 tablet (500 mg total) by mouth daily with dinner, Starting Sun 2/20/2022, Normal      metoprolol succinate (TOPROL-XL) 25 mg 24 hr tablet Take 1 5 tablets (37 5 mg total) by mouth 2 (two) times a day, Starting Sun 2/20/2022, Normal           Discharge Medication List as of 2/20/2022  5:36 PM      CONTINUE these medications which have NOT CHANGED    Details   cyanocobalamin (VITAMIN B-12) 100 MCG tablet Take 100 mcg by mouth daily, Historical Med      nitroglycerin (NITROSTAT) 0 4 mg SL tablet Place 1 tablet (0 4 mg total) under the tongue every 5 (five) minutes as needed for chest pain, Starting Wed 1/12/2022, Normal             Allergies:  No Known Allergies    FOLLOW-UP     PCP Outpatient Follow-up:  Pt not interested in PCP in our area  Provided w/ infolink on discharge  Consulting Providers Follow-up:  Cardiology, pulmonology    Active Issues Requiring Follow-up:   HFrEF    Discharge Statement:   I spent 45 minutes minutes discharging the patient  This time was spent on the day of discharge  I had direct contact with the patient on the day of discharge  Additional documentation is required if more than 30 minutes were spent on discharge  Portions of the record may have been created with voice recognition software  Occasional wrong word or "sound a like" substitutions may have occurred due to the inherent limitations of voice recognition software    Read the chart carefully and recognize, using context, where substitutions have occurred     ==  Iris Sandhu, [address] [facility]  Internal Medicine Resident PGY-3

## 2022-02-28 ENCOUNTER — PATIENT OUTREACH (OUTPATIENT)
Dept: CASE MANAGEMENT | Facility: OTHER | Age: 72
End: 2022-02-28

## 2022-03-02 NOTE — H&P (VIEW-ONLY)
Cardiology Follow Up    Rochelle Avendano  1950  [id number]  [id number]  [phone]    1  Triple vessel coronary artery disease     2  Chronic combined systolic and diastolic congestive heart failure ([address] )     3  Ischemic cardiomyopathy     4  Dyslipidemia         Discussion/Summary:  Coronary artery disease: has triple vessel disease per [facility]  Turned down for CABG  Underwent FABIOLA x2 to the proximal LAD and FABIOLA to the mid LAD  For staged PCI to the circumflex +/- OM1  Will need to have this scheduled - will arrange  No symptoms of angina at this time  Continue aspirin, plavix, statin, beta-blocker  Chronic combined systolic and diastolic congestive heart failure: besides bilateral lower extremity edema (patient reports chronic dependent edema), appears euvolemic on exam  Lungs are completely CTA  He has been taking lasix 40 mg every other day due to frequent urination  Would continue this for now  He is unable to weigh himself secondary to paraplegia  Discussed signs/symptoms of volume overload and parameters for when to call the office  He needs BMP today to reassess renal function and electrolytes  Ischemic cardiomyopathy: EF of 20%  Will titrate metoprolol succinate to 50 mg BID given resting borderline tachycardia  Can consider addition of ACE inhibitor at next visit - hold off for now given increase in metoprolol dose and baseline normotension/mild hypotension  Patient refused Life Vest  Will need repeat echo in 3 months to reassess EF - will be due in May 2022  Dyslipidemia: most recent LDL was 135  Continue high intensity statin therapy  Repeat lipid panel + CMP in 2 months  He will RTO in 6-8 weeks with Dr dAenike Desai or sooner if necessary  He will call with any concerns       Interval History:   Rochelle Avendano is a 81 y o  male with multivessel coronary artery disease, ischemic cardiomyopathy with an EF of 20%, chronic combined systolic and diastolic congestive heart failure, dyslipidemia, diabetes, paraplegia who presents to the office today for hospital follow up  The patient recently was hospitalized with acute congestive heart failure  He was found to have elevated high sensitivity troponin levels and EKG changes  He was [facility] for a cardiac catheterization which revealed triple vessel coronary artery disease  He was evaluated by CT surgery but was felt to be a poor candidate due to his respiratory status/paraplegia  He did undergo high-risk PCI of the proximal LAD with FABIOLA x 2 and mid LAD with FABIOLA x 1  He is to undergo staged PCI to the circumflex +/- OM in 2-4 weeks  He was diuresed with IV lasix and transitioned to PO lasix 40 mg daily at discharge  Since hospital discharge he overall has been feeling well  He notes improvement in his breathing overall and reports he has stopped using supplemental oxygen during the day  SP02 levels have been between 94-97% at rest at home  He continues to wear oxygen at night  He reports orthopnea since having COVID-19 in January and reports that this has not improved  He denies chest pain/pressure/discomfort  He is unable to weigh himself daily  He states he started taking lasix every other day instead of daily due to frequent urination as he has to straight cath himself due to a neurogenic bladder  His lower extremity edema has remained stable  He has been attempting to follow a low sodium diet  He denies lightheadedness, dizziness, palpitations, and syncope  BP at home has been running in the upper one-teens/60's range      Medical Problems             Problem List     Paraplegia ([address] )    Pneumonia due to COVID-19 virus    Skin ulcer of toe of left foot with fat layer exposed ([address] )    Skin ulcer of toe of right foot with fat layer exposed ([address] )    PAD (peripheral artery disease) ([address] )    Acute respiratory failure with hypoxia (HCC)    Elevated troponin    Neurogenic bladder    Other constipation    Transaminitis    Elevated CPK    Acute on chronic systolic congestive heart failure (HCC)    Wt Readings from Last 3 Encounters:   02/18/22 78 5 kg (173 lb)   02/15/22 83 2 kg (183 lb 6 8 oz)   01/12/22 82 7 kg (182 lb 5 1 oz)                 Acute on chronic respiratory failure with hypoxia (HCC)    Hemoptysis    Type 2 diabetes mellitus ([facility])      Lab Results   Component Value Date    HGBA1C 6 5 (H) 01/06/2022         Iron deficiency anemia              Past Medical History:   Diagnosis Date    CHF (congestive heart failure) ([facility])     COVID     Paraplegia (HCC)     x30 years     Social History     Socioeconomic History    Marital status: /Civil Union     Spouse name: Not on file    Number of children: Not on file    Years of education: Not on file    Highest education level: Not on file   Occupational History    Not on file   Tobacco Use    Smoking status: Never Smoker    Smokeless tobacco: Never Used   Vaping Use    Vaping Use: Never used   Substance and Sexual Activity    Alcohol use: Never     Alcohol/week: 0 0 standard drinks    Drug use: Never    Sexual activity: Not on file   Other Topics Concern    Not on file   Social History Narrative    Not on file     Social Determinants of Health     Financial Resource Strain: Not on file   Food Insecurity: No Food Insecurity    Worried About Running Out of Food in the Last Year: Never true    [place] of Food in the Last Year: Never true   Transportation Needs: No Transportation Needs    Lack of Transportation (Medical): No    Lack of Transportation (Non-Medical):  No   Physical Activity: Not on file   Stress: Not on file   Social Connections: Not on file   Intimate Partner Violence: Not on file   Housing Stability: Low Risk     Unable to Pay for Housing in the Last Year: No    Number of Places Lived in the Last Year: 1    Unstable Housing in the Last Year: No      Family History Problem Relation Age of Onset    Cancer Mother     Stroke Mother     Cancer Father     Alcohol abuse Father      Past Surgical History:   Procedure Laterality Date    BACK SURGERY      CARDIAC CATHETERIZATION N/A 2/16/2022    Procedure: CARDIAC CORONARY ANGIOGRAM;  Surgeon: Kalpesh Perry MD;  Location: [facility]; Service: Cardiology    CARDIAC CATHETERIZATION Left 2/16/2022    Procedure: Cardiac Left Heart Cath;  Surgeon: Kalpesh Perry MD;  [facility]; Service: Cardiology    CARDIAC CATHETERIZATION N/A 2/18/2022    Procedure: Cardiac pci;  Surgeon: Kalpesh Perry MD;  [facility];   Service: Cardiology       Current Outpatient Medications:     aspirin (ECOTRIN LOW STRENGTH) 81 mg EC tablet, Take 1 tablet (81 mg total) by mouth daily, Disp: 30 tablet, Rfl: 0    atorvastatin (LIPITOR) 40 mg tablet, Take 1 tablet (40 mg total) by mouth daily with dinner, Disp: 30 tablet, Rfl: 3    clopidogrel (PLAVIX) 75 mg tablet, Take 1 tablet (75 mg total) by mouth daily, Disp: 30 tablet, Rfl: 0    cyanocobalamin (VITAMIN B-12) 100 MCG tablet, Take 100 mcg by mouth daily, Disp: , Rfl:     ferrous sulfate 325 (65 Fe) mg tablet, Take 1 tablet (325 mg total) by mouth every other day, Disp: 30 tablet, Rfl: 0    furosemide (LASIX) 40 mg tablet, Take 1 tablet (40 mg total) by mouth daily, Disp: 30 tablet, Rfl: 1    metFORMIN (GLUCOPHAGE-XR) 500 mg 24 hr tablet, Take 1 tablet (500 mg total) by mouth daily with dinner, Disp: 30 tablet, Rfl: 0    metoprolol succinate (TOPROL-XL) 25 mg 24 hr tablet, Take 1 5 tablets (37 5 mg total) by mouth 2 (two) times a day, Disp: 90 tablet, Rfl: 1    nitroglycerin (NITROSTAT) 0 4 mg SL tablet, Place 1 tablet (0 4 mg total) under the tongue every 5 (five) minutes as needed for chest pain, Disp: 9 tablet, Rfl: 0    albuterol (PROVENTIL HFA,VENTOLIN HFA) 90 mcg/act inhaler, Inhale 2 puffs every 4 (four) hours as needed for wheezing (Patient not taking: Reported on 3/3/2022 ), Disp: 18 g, Rfl: 0  No Known Allergies    Labs:     Chemistry        Component Value Date/Time    K 3 8 02/20/2022 0509     02/20/2022 0509    CO2 26 02/20/2022 0509    BUN 15 02/20/2022 0509    CREATININE 0 60 02/20/2022 0509        Component Value Date/Time    CALCIUM 8 8 02/20/2022 0509    ALKPHOS 65 02/16/2022 0609    AST 59 (H) 02/16/2022 0609    ALT 34 02/16/2022 0609            No results found for: CHOL  Lab Results   Component Value Date    HDL 28 (L) 02/16/2022    HDL 30 (L) 01/06/2022     Lab Results   Component Value Date    LDLCALC 135 (H) 02/16/2022    LDLCALC 141 (H) 01/06/2022     Lab Results   Component Value Date    TRIG 133 02/16/2022    TRIG 64 01/06/2022     No results found for: CHOLHDL    Imaging: XR chest portable    Result Date: 2/20/2022  Narrative: CHEST INDICATION:   hemoptysis  COMPARISON:  2/17/2022 EXAM PERFORMED/VIEWS:  XR CHEST PORTABLE FINDINGS: Cardiomediastinal silhouette appears upper normal in size  Persistent diffuse bilateral alveolar infiltrates, slightly increased  Slight bilateral costophrenic angle blunting  No pneumothorax or pleural effusion  Spinal rods again noted  Impression: Persistent slight increased bilateral alveolar edema and or superimposed infiltrates Small pleural effusions Workstation performed: [id number]     XR chest portable    Result Date: 2/17/2022  Narrative: CHEST INDICATION:   hemoptysis, hypoxia  COMPARISON:  CT pulmonary angiogram February 15, 2022 and chest radiographs February 13, 2022  EXAM PERFORMED/VIEWS:  XR CHEST PORTABLE  AP semierect Images:  1 FINDINGS: Heart shadow appears unremarkable  Atherosclerotic vascular calcifications are noted  The lungs are fairly well aerated  Diffuse alveolar infiltrates are present throughout the lungs bilaterally, similar to the most recent CT pulmonary angiogram   Bilateral pleural effusions, right side larger than left   Postoperative changes in the spine      Impression: Stable bilateral alveolar infiltrates and pleural effusions when compared to the most recent CT pulmonary angiogram   Differential continues to remain alveolar edema versus multifocal/multi lobar pneumonia  Correlate for Covid type pneumonia  Workstation performed: [id number]     XR chest pa & lateral    Result Date: 2/13/2022  Narrative: CHEST INDICATION:   U07 1: COVID-19 R06 01: Orthopnea  History of COVID-19 pneumonia with persistent symptoms  COMPARISON:  Chest x-ray from 1/9/2020 EXAM PERFORMED/VIEWS:  XR CHEST PA & LATERAL FINDINGS: Cardiomediastinal silhouette appears unremarkable  Compared to 1/9/2022, there is significant improvement in diffuse air post opacities, with residual airspace opacity still present in the right mid lung zone  No pleural effusion or pneumothorax  Osseous structures appear within normal limits for patient age  Impression: Compared to 1/9/2022, there is significant improvement in diffuse air post opacities, with residual airspace opacity still present in the right mid lung zone  Workstation performed: [id number]     Cardiac catheterization    Result Date: 2/18/2022  Narrative: · Prox LAD lesion is 90% stenosed  · Mid LAD lesion is 90% stenosed  Cardiac catheterization    Result Date: 2/16/2022  Narrative: · Ramus lesion is 100% stenosed  · 1st Diag lesion is 99% stenosed  · Prox LAD lesion is 90% stenosed  · Prox Cx lesion is 90% stenosed  · Mid LAD lesion is 70% stenosed  · Ost RCA to Prox RCA lesion is 100% stenosed  Severe 3v CAD     CTA ED chest PE study    Result Date: 2/15/2022  Narrative: CTA - CHEST WITH IV CONTRAST - PULMONARY ANGIOGRAM INDICATION:   sob, hyoxia, hemoptysis, covid hospitalization approx 1 month ago  COMPARISON: None  TECHNIQUE: CTA examination of the chest was performed using angiographic technique according to a protocol specifically tailored to evaluate for pulmonary embolism    Axial, sagittal, and coronal 2D reformatted images were created from the source data and  submitted for interpretation  In addition, coronal 3D MIP postprocessing was performed on the acquisition scanner  Radiation dose length product (DLP) for this visit:  567 71 mGy-cm   This examination, like all CT scans performed in the [facility], was performed utilizing techniques to minimize radiation dose exposure, including the use of iterative  reconstruction and automated exposure control  IV Contrast:  85 mL of iohexol (OMNIPAQUE)  FINDINGS: PULMONARY ARTERIAL TREE:  No pulmonary embolus is seen  LUNGS:  Symmetric bilateral groundglass airspace disease in a mostly perihilar distribution  Mild interlobar septal thickening Emphysematous changes  There is no tracheal or endobronchial lesion  PLEURA:  Moderate size bilateral pleural effusions  HEART/GREAT VESSELS:  Coronary artery calcification is noted  Heart is otherwise unremarkable  No thoracic aortic aneurysm  No thoracic aortic aneurysm  MEDIASTINUM AND VIRI:  Mild mediastinal and bilateral hilar adenopathy  CHEST WALL AND LOWER NECK:   Unremarkable  VISUALIZED STRUCTURES IN THE UPPER ABDOMEN:  Unremarkable  OSSEOUS STRUCTURES:  No acute fracture or destructive osseous lesion  Thoracolumbar metallic rods partially visualized  Impression: No evidence of pulmonary embolus  Bilateral groundglass airspace disease in a mostly perihilar distribution likely secondary to CHF although component of residual COVID pneumonia not excluded  Moderate bilateral pleural effusions Workstation performed: [id number]     Echo follow up/limited w/ contrast if indicated    Result Date: 2/16/2022  Narrative: Quin Slipper  Left Ventricle: Left ventricular cavity size is mildly dilated  Wall thickness is normal  The left ventricular ejection fraction is 20%  Systolic function is severely reduced  There is severe global hypokinesis     Right Ventricle: Right ventricular cavity size is normal  Systolic function is normal   Left Atrium: The atrium is mildly dilated    Mitral Valve: There is moderate regurgitation    Tricuspid Valve: There is mild regurgitation  The estimated right ventricular systolic pressure is 46 mmHg    Pulmonic Valve: There is mild regurgitation  Review of Systems   Constitutional: Negative for chills and fever  HENT: Negative  Cardiovascular: Positive for leg swelling and orthopnea  Negative for chest pain, palpitations and syncope  Respiratory: Negative for cough and shortness of breath  Gastrointestinal: Negative for diarrhea, nausea and vomiting  Genitourinary: Negative  Neurological: Negative for dizziness and light-headedness  All other systems reviewed and are negative  Vitals:    03/03/22 1245   BP: 120/67   Pulse: 100     Vitals:     Height: 5' 4" (162 6 cm)   Body mass index is 29 7 kg/m²  Physical Exam:  Physical Exam  Vitals reviewed  Constitutional:       General: He is not in acute distress  Appearance: He is well-developed  He is not diaphoretic  Comments: Pleasant male sitting in a wheelchair in no acute distress   HENT:      Head: Normocephalic and atraumatic  Eyes:      Pupils: Pupils are equal, round, and reactive to light  Neck:      Vascular: No carotid bruit  Cardiovascular:      Rate and Rhythm: Normal rate and regular rhythm  Pulses:           Radial pulses are 2+ on the right side and 2+ on the left side  Heart sounds: S1 normal and S2 normal  No murmur heard  Pulmonary:      Effort: Pulmonary effort is normal  No respiratory distress  Breath sounds: Normal breath sounds  No wheezing or rales  Abdominal:      General: There is no distension  Palpations: Abdomen is soft  Tenderness: There is no abdominal tenderness  Musculoskeletal:         General: Normal range of motion  Cervical back: Normal range of motion  Right lower leg: Edema (+2 pitting) present        Left lower leg: Edema (+3 pitting) present  Skin:     General: Skin is warm and dry  Capillary Refill: Capillary refill takes less than 2 seconds  Findings: No erythema  Neurological:      General: No focal deficit present  Mental Status: He is alert and oriented to person, place, and time     Psychiatric:         Mood and Affect: Mood normal          Behavior: Behavior normal

## 2022-03-02 NOTE — PROGRESS NOTES
Patient returned my call  He reports he has a follow up appointment with cardiology 3/3  I advised him he needs a PCP and he does agree  I advised him to ask for a referral when he sees the cardiologist   He reports feeling he is still recovering from long term effect of COVID  I asked how he would get to his appointment  Spouse or family members drive patient  He reports he has no needs at this time  He denies chest pain or shortness of breath  He did consent to another call

## 2022-03-02 NOTE — PROGRESS NOTES
Cardiology Follow Up    Gina Gale  1950  [id number]  [address] [id number]  [phone]    1  Triple vessel coronary artery disease     2  Chronic combined systolic and diastolic congestive heart failure ([address] )     3  Ischemic cardiomyopathy     4  Dyslipidemia         Discussion/Summary:  Coronary artery disease: has triple vessel disease per [facility]  Turned down for CABG  Underwent FABIOLA x2 to the proximal LAD and FABIOLA to the mid LAD  For staged PCI to the circumflex +/- OM1  Will need to have this scheduled - will arrange  No symptoms of angina at this time  Continue aspirin, plavix, statin, beta-blocker  Chronic combined systolic and diastolic congestive heart failure: besides bilateral lower extremity edema (patient reports chronic dependent edema), appears euvolemic on exam  Lungs are completely CTA  He has been taking lasix 40 mg every other day due to frequent urination  Would continue this for now  He is unable to weigh himself secondary to paraplegia  Discussed signs/symptoms of volume overload and parameters for when to call the office  He needs BMP today to reassess renal function and electrolytes  Ischemic cardiomyopathy: EF of 20%  Will titrate metoprolol succinate to 50 mg BID given resting borderline tachycardia  Can consider addition of ACE inhibitor at next visit - hold off for now given increase in metoprolol dose and baseline normotension/mild hypotension  Patient refused Life Vest  Will need repeat echo in 3 months to reassess EF - will be due in May 2022  Dyslipidemia: most recent LDL was 135  Continue high intensity statin therapy  Repeat lipid panel + CMP in 2 months  He will RTO in 6-8 weeks with Dr Jen Madison or sooner if necessary  He will call with any concerns       Interval History:   Gina Gale is a 81 y o  male with multivessel coronary artery disease, ischemic cardiomyopathy with an EF of 20%, chronic combined systolic and diastolic congestive heart failure, dyslipidemia, diabetes, paraplegia who presents to the office today for hospital follow up  The patient recently was hospitalized with acute congestive heart failure  He was found to have elevated high sensitivity troponin levels and EKG changes  He was transferred to [facility] for a cardiac catheterization which revealed triple vessel coronary artery disease  He was evaluated by CT surgery but was felt to be a poor candidate due to his respiratory status/paraplegia  He did undergo high-risk PCI of the proximal LAD with FABIOLA x 2 and mid LAD with FABIOLA x 1  He is to undergo staged PCI to the circumflex +/- OM in 2-4 weeks  He was diuresed with IV lasix and transitioned to PO lasix 40 mg daily at discharge  Since hospital discharge he overall has been feeling well  He notes improvement in his breathing overall and reports he has stopped using supplemental oxygen during the day  SP02 levels have been between 94-97% at rest at home  He continues to wear oxygen at night  He reports orthopnea since having COVID-19 in January and reports that this has not improved  He denies chest pain/pressure/discomfort  He is unable to weigh himself daily  He states he started taking lasix every other day instead of daily due to frequent urination as he has to straight cath himself due to a neurogenic bladder  His lower extremity edema has remained stable  He has been attempting to follow a low sodium diet  He denies lightheadedness, dizziness, palpitations, and syncope  BP at home has been running in the upper one-teens/60's range      Medical Problems             Problem List     Paraplegia ([address] )    Pneumonia due to COVID-19 virus    Skin ulcer of toe of left foot with fat layer exposed ([address] )    Skin ulcer of toe of right foot with fat layer exposed ([address] )    PAD (peripheral artery disease) ([address] )    Acute respiratory failure with hypoxia (HCC)    Elevated troponin    Neurogenic bladder    Other constipation    Transaminitis    Elevated CPK    Acute on chronic systolic congestive heart failure (HCC)    Wt Readings from Last 3 Encounters:   02/18/22 78 5 kg (173 lb)   02/15/22 83 2 kg (183 lb 6 8 oz)   01/12/22 82 7 kg (182 lb 5 1 oz)                 Acute on chronic respiratory failure with hypoxia (HCC)    Hemoptysis    Type 2 diabetes mellitus ([facility])      Lab Results   Component Value Date    HGBA1C 6 5 (H) 01/06/2022         Iron deficiency anemia              Past Medical History:   Diagnosis Date    CHF (congestive heart failure) ([facility])     COVID     Paraplegia (HCC)     x30 years     Social History     Socioeconomic History    Marital status: /Civil Union     Spouse name: Not on file    Number of children: Not on file    Years of education: Not on file    Highest education level: Not on file   Occupational History    Not on file   Tobacco Use    Smoking status: Never Smoker    Smokeless tobacco: Never Used   Vaping Use    Vaping Use: Never used   Substance and Sexual Activity    Alcohol use: Never     Alcohol/week: 0 0 standard drinks    Drug use: Never    Sexual activity: Not on file   Other Topics Concern    Not on file   Social History Narrative    Not on file     Social Determinants of Health     Financial Resource Strain: Not on file   Food Insecurity: No Food Insecurity    Worried About Running Out of Food in the Last Year: Never true    [place] of Food in the Last Year: Never true   Transportation Needs: No Transportation Needs    Lack of Transportation (Medical): No    Lack of Transportation (Non-Medical):  No   Physical Activity: Not on file   Stress: Not on file   Social Connections: Not on file   Intimate Partner Violence: Not on file   Housing Stability: Low Risk     Unable to Pay for Housing in the Last Year: No    Number of Places Lived in the Last Year: 1    Unstable Housing in the Last Year: No      Family History Problem Relation Age of Onset    Cancer Mother     Stroke Mother     Cancer Father     Alcohol abuse Father      Past Surgical History:   Procedure Laterality Date    BACK SURGERY      CARDIAC CATHETERIZATION N/A 2/16/2022    Procedure: CARDIAC CORONARY ANGIOGRAM;  Surgeon: Jluis De La Cruz MD;  Location: [facility]; Service: Cardiology    CARDIAC CATHETERIZATION Left 2/16/2022    Procedure: Cardiac Left Heart Cath;  Surgeon: Jluis De La Cruz MD;  [facility]; Service: Cardiology    CARDIAC CATHETERIZATION N/A 2/18/2022    Procedure: Cardiac pci;  Surgeon: Jluis De La Cruz MD;  Location: [facility];   Service: Cardiology       Current Outpatient Medications:     aspirin (ECOTRIN LOW STRENGTH) 81 mg EC tablet, Take 1 tablet (81 mg total) by mouth daily, Disp: 30 tablet, Rfl: 0    atorvastatin (LIPITOR) 40 mg tablet, Take 1 tablet (40 mg total) by mouth daily with dinner, Disp: 30 tablet, Rfl: 3    clopidogrel (PLAVIX) 75 mg tablet, Take 1 tablet (75 mg total) by mouth daily, Disp: 30 tablet, Rfl: 0    cyanocobalamin (VITAMIN B-12) 100 MCG tablet, Take 100 mcg by mouth daily, Disp: , Rfl:     ferrous sulfate 325 (65 Fe) mg tablet, Take 1 tablet (325 mg total) by mouth every other day, Disp: 30 tablet, Rfl: 0    furosemide (LASIX) 40 mg tablet, Take 1 tablet (40 mg total) by mouth daily, Disp: 30 tablet, Rfl: 1    metFORMIN (GLUCOPHAGE-XR) 500 mg 24 hr tablet, Take 1 tablet (500 mg total) by mouth daily with dinner, Disp: 30 tablet, Rfl: 0    metoprolol succinate (TOPROL-XL) 25 mg 24 hr tablet, Take 1 5 tablets (37 5 mg total) by mouth 2 (two) times a day, Disp: 90 tablet, Rfl: 1    nitroglycerin (NITROSTAT) 0 4 mg SL tablet, Place 1 tablet (0 4 mg total) under the tongue every 5 (five) minutes as needed for chest pain, Disp: 9 tablet, Rfl: 0    albuterol (PROVENTIL HFA,VENTOLIN HFA) 90 mcg/act inhaler, Inhale 2 puffs every 4 (four) hours as needed for wheezing (Patient not taking: Reported on 3/3/2022 ), Disp: 18 g, Rfl: 0  No Known Allergies    Labs:     Chemistry        Component Value Date/Time    K 3 8 02/20/2022 0509     02/20/2022 0509    CO2 26 02/20/2022 0509    BUN 15 02/20/2022 0509    CREATININE 0 60 02/20/2022 0509        Component Value Date/Time    CALCIUM 8 8 02/20/2022 0509    ALKPHOS 65 02/16/2022 0609    AST 59 (H) 02/16/2022 0609    ALT 34 02/16/2022 0609            No results found for: CHOL  Lab Results   Component Value Date    HDL 28 (L) 02/16/2022    HDL 30 (L) 01/06/2022     Lab Results   Component Value Date    LDLCALC 135 (H) 02/16/2022    LDLCALC 141 (H) 01/06/2022     Lab Results   Component Value Date    TRIG 133 02/16/2022    TRIG 64 01/06/2022     No results found for: CHOLHDL    Imaging: XR chest portable    Result Date: 2/20/2022  Narrative: CHEST INDICATION:   hemoptysis  COMPARISON:  2/17/2022 EXAM PERFORMED/VIEWS:  XR CHEST PORTABLE FINDINGS: Cardiomediastinal silhouette appears upper normal in size  Persistent diffuse bilateral alveolar infiltrates, slightly increased  Slight bilateral costophrenic angle blunting  No pneumothorax or pleural effusion  Spinal rods again noted  Impression: Persistent slight increased bilateral alveolar edema and or superimposed infiltrates Small pleural effusions Workstation performed: [id number]     XR chest portable    Result Date: 2/17/2022  Narrative: CHEST INDICATION:   hemoptysis, hypoxia  COMPARISON:  CT pulmonary angiogram February 15, 2022 and chest radiographs February 13, 2022  EXAM PERFORMED/VIEWS:  XR CHEST PORTABLE  AP semierect Images:  1 FINDINGS: Heart shadow appears unremarkable  Atherosclerotic vascular calcifications are noted  The lungs are fairly well aerated  Diffuse alveolar infiltrates are present throughout the lungs bilaterally, similar to the most recent CT pulmonary angiogram   Bilateral pleural effusions, right side larger than left   Postoperative changes in the spine      Impression: Stable bilateral alveolar infiltrates and pleural effusions when compared to the most recent CT pulmonary angiogram   Differential continues to remain alveolar edema versus multifocal/multi lobar pneumonia  Correlate for Covid type pneumonia  Workstation performed: [id number]     XR chest pa & lateral    Result Date: 2/13/2022  Narrative: CHEST INDICATION:   U07 1: COVID-19 R06 01: Orthopnea  History of COVID-19 pneumonia with persistent symptoms  COMPARISON:  Chest x-ray from 1/9/2020 EXAM PERFORMED/VIEWS:  XR CHEST PA & LATERAL FINDINGS: Cardiomediastinal silhouette appears unremarkable  Compared to 1/9/2022, there is significant improvement in diffuse air post opacities, with residual airspace opacity still present in the right mid lung zone  No pleural effusion or pneumothorax  Osseous structures appear within normal limits for patient age  Impression: Compared to 1/9/2022, there is significant improvement in diffuse air post opacities, with residual airspace opacity still present in the right mid lung zone  Workstation performed: [id number]     Cardiac catheterization    Result Date: 2/18/2022  Narrative: · Prox LAD lesion is 90% stenosed  · Mid LAD lesion is 90% stenosed  Cardiac catheterization    Result Date: 2/16/2022  Narrative: · Ramus lesion is 100% stenosed  · 1st Diag lesion is 99% stenosed  · Prox LAD lesion is 90% stenosed  · Prox Cx lesion is 90% stenosed  · Mid LAD lesion is 70% stenosed  · Ost RCA to Prox RCA lesion is 100% stenosed  Severe 3v CAD     CTA ED chest PE study    Result Date: 2/15/2022  Narrative: CTA - CHEST WITH IV CONTRAST - PULMONARY ANGIOGRAM INDICATION:   sob, hyoxia, hemoptysis, covid hospitalization approx 1 month ago  COMPARISON: None  TECHNIQUE: CTA examination of the chest was performed using angiographic technique according to a protocol specifically tailored to evaluate for pulmonary embolism    Axial, sagittal, and coronal 2D reformatted images were created from the source data and  submitted for interpretation  In addition, coronal 3D MIP postprocessing was performed on the acquisition scanner  Radiation dose length product (DLP) for this visit:  567 71 mGy-cm   This examination, like all CT scans performed in the [facility], was performed utilizing techniques to minimize radiation dose exposure, including the use of iterative  reconstruction and automated exposure control  IV Contrast:  85 mL of iohexol (OMNIPAQUE)  FINDINGS: PULMONARY ARTERIAL TREE:  No pulmonary embolus is seen  LUNGS:  Symmetric bilateral groundglass airspace disease in a mostly perihilar distribution  Mild interlobar septal thickening Emphysematous changes  There is no tracheal or endobronchial lesion  PLEURA:  Moderate size bilateral pleural effusions  HEART/GREAT VESSELS:  Coronary artery calcification is noted  Heart is otherwise unremarkable  No thoracic aortic aneurysm  No thoracic aortic aneurysm  MEDIASTINUM AND VIRI:  Mild mediastinal and bilateral hilar adenopathy  CHEST WALL AND LOWER NECK:   Unremarkable  VISUALIZED STRUCTURES IN THE UPPER ABDOMEN:  Unremarkable  OSSEOUS STRUCTURES:  No acute fracture or destructive osseous lesion  Thoracolumbar metallic rods partially visualized  Impression: No evidence of pulmonary embolus  Bilateral groundglass airspace disease in a mostly perihilar distribution likely secondary to CHF although component of residual COVID pneumonia not excluded  Moderate bilateral pleural effusions Workstation performed: [id number]     Echo follow up/limited w/ contrast if indicated    Result Date: 2/16/2022  Narrative: Betty Maldonado  Left Ventricle: Left ventricular cavity size is mildly dilated  Wall thickness is normal  The left ventricular ejection fraction is 20%  Systolic function is severely reduced  There is severe global hypokinesis     Right Ventricle: Right ventricular cavity size is normal  Systolic function is normal   Left Atrium: The atrium is mildly dilated    Mitral Valve: There is moderate regurgitation    Tricuspid Valve: There is mild regurgitation  The estimated right ventricular systolic pressure is 46 mmHg    Pulmonic Valve: There is mild regurgitation  Review of Systems   Constitutional: Negative for chills and fever  HENT: Negative  Cardiovascular: Positive for leg swelling and orthopnea  Negative for chest pain, palpitations and syncope  Respiratory: Negative for cough and shortness of breath  Gastrointestinal: Negative for diarrhea, nausea and vomiting  Genitourinary: Negative  Neurological: Negative for dizziness and light-headedness  All other systems reviewed and are negative  Vitals:    03/03/22 1245   BP: 120/67   Pulse: 100     Vitals:     Height: 5' 4" (162 6 cm)   Body mass index is 29 7 kg/m²  Physical Exam:  Physical Exam  Vitals reviewed  Constitutional:       General: He is not in acute distress  Appearance: He is well-developed  He is not diaphoretic  Comments: Pleasant male sitting in a wheelchair in no acute distress   HENT:      Head: Normocephalic and atraumatic  Eyes:      Pupils: Pupils are equal, round, and reactive to light  Neck:      Vascular: No carotid bruit  Cardiovascular:      Rate and Rhythm: Normal rate and regular rhythm  Pulses:           Radial pulses are 2+ on the right side and 2+ on the left side  Heart sounds: S1 normal and S2 normal  No murmur heard  Pulmonary:      Effort: Pulmonary effort is normal  No respiratory distress  Breath sounds: Normal breath sounds  No wheezing or rales  Abdominal:      General: There is no distension  Palpations: Abdomen is soft  Tenderness: There is no abdominal tenderness  Musculoskeletal:         General: Normal range of motion  Cervical back: Normal range of motion  Right lower leg: Edema (+2 pitting) present        Left lower leg: Edema (+3 pitting) present  Skin:     General: Skin is warm and dry  Capillary Refill: Capillary refill takes less than 2 seconds  Findings: No erythema  Neurological:      General: No focal deficit present  Mental Status: He is alert and oriented to person, place, and time     Psychiatric:         Mood and Affect: Mood normal          Behavior: Behavior normal

## 2022-03-03 ENCOUNTER — PREP FOR PROCEDURE (OUTPATIENT)
Dept: CARDIOLOGY CLINIC | Facility: HOSPITAL | Age: 72
End: 2022-03-03

## 2022-03-03 ENCOUNTER — APPOINTMENT (OUTPATIENT)
Dept: LAB | Facility: HOSPITAL | Age: 72
End: 2022-03-03
Payer: MEDICARE

## 2022-03-03 ENCOUNTER — OFFICE VISIT (OUTPATIENT)
Dept: CARDIOLOGY CLINIC | Facility: HOSPITAL | Age: 72
End: 2022-03-03
Payer: MEDICARE

## 2022-03-03 VITALS
DIASTOLIC BLOOD PRESSURE: 67 MMHG | HEART RATE: 100 BPM | SYSTOLIC BLOOD PRESSURE: 120 MMHG | HEIGHT: 64 IN | BODY MASS INDEX: 29.7 KG/M2

## 2022-03-03 DIAGNOSIS — I25.10 CAD (CORONARY ARTERY DISEASE): ICD-10-CM

## 2022-03-03 DIAGNOSIS — J96.21 ACUTE ON CHRONIC RESPIRATORY FAILURE WITH HYPOXIA (HCC): ICD-10-CM

## 2022-03-03 DIAGNOSIS — I50.42 CHRONIC COMBINED SYSTOLIC AND DIASTOLIC CONGESTIVE HEART FAILURE (HCC): ICD-10-CM

## 2022-03-03 DIAGNOSIS — I25.10 TRIPLE VESSEL CORONARY ARTERY DISEASE: Primary | ICD-10-CM

## 2022-03-03 DIAGNOSIS — R77.8 ELEVATED TROPONIN: ICD-10-CM

## 2022-03-03 DIAGNOSIS — E78.5 DYSLIPIDEMIA: ICD-10-CM

## 2022-03-03 DIAGNOSIS — I25.10 CORONARY ARTERY DISEASE INVOLVING NATIVE CORONARY ARTERY OF NATIVE HEART WITHOUT ANGINA PECTORIS: Primary | ICD-10-CM

## 2022-03-03 DIAGNOSIS — E11.9 TYPE 2 DIABETES MELLITUS (HCC): ICD-10-CM

## 2022-03-03 DIAGNOSIS — D50.9 IRON DEFICIENCY ANEMIA: ICD-10-CM

## 2022-03-03 DIAGNOSIS — I25.5 ISCHEMIC CARDIOMYOPATHY: ICD-10-CM

## 2022-03-03 DIAGNOSIS — I50.20 SYSTOLIC CONGESTIVE HEART FAILURE, UNSPECIFIED HF CHRONICITY (HCC): ICD-10-CM

## 2022-03-03 LAB
ANION GAP SERPL CALCULATED.3IONS-SCNC: 7 MMOL/L (ref 4–13)
BUN SERPL-MCNC: 20 MG/DL (ref 5–25)
CALCIUM SERPL-MCNC: 9.3 MG/DL (ref 8.3–10.1)
CHLORIDE SERPL-SCNC: 102 MMOL/L (ref 100–108)
CO2 SERPL-SCNC: 28 MMOL/L (ref 21–32)
CREAT SERPL-MCNC: 0.75 MG/DL (ref 0.6–1.3)
GFR SERPL CREATININE-BSD FRML MDRD: 92 ML/MIN/1.73SQ M
GLUCOSE SERPL-MCNC: 102 MG/DL (ref 65–140)
POTASSIUM SERPL-SCNC: 3.6 MMOL/L (ref 3.5–5.3)
SODIUM SERPL-SCNC: 137 MMOL/L (ref 136–145)

## 2022-03-03 PROCEDURE — 99214 OFFICE O/P EST MOD 30 MIN: CPT | Performed by: PHYSICIAN ASSISTANT

## 2022-03-03 PROCEDURE — 36415 COLL VENOUS BLD VENIPUNCTURE: CPT

## 2022-03-03 PROCEDURE — 80048 BASIC METABOLIC PNL TOTAL CA: CPT

## 2022-03-03 RX ORDER — METOPROLOL SUCCINATE 50 MG/1
50 TABLET, EXTENDED RELEASE ORAL 2 TIMES DAILY
Qty: 180 TABLET | Refills: 3 | Status: SHIPPED | OUTPATIENT
Start: 2022-03-03

## 2022-03-03 RX ORDER — FUROSEMIDE 40 MG/1
40 TABLET ORAL DAILY
Qty: 90 TABLET | Refills: 3 | Status: SHIPPED | OUTPATIENT
Start: 2022-03-03

## 2022-03-03 RX ORDER — METFORMIN HYDROCHLORIDE 500 MG/1
500 TABLET, EXTENDED RELEASE ORAL
Qty: 30 TABLET | Refills: 0 | Status: ON HOLD | OUTPATIENT
Start: 2022-03-03 | End: 2022-03-28 | Stop reason: ALTCHOICE

## 2022-03-03 RX ORDER — ASPIRIN 81 MG/1
81 TABLET ORAL DAILY
Qty: 90 TABLET | Refills: 3 | Status: SHIPPED | OUTPATIENT
Start: 2022-03-03

## 2022-03-03 RX ORDER — ASPIRIN 81 MG/1
324 TABLET, CHEWABLE ORAL ONCE
Status: CANCELLED | OUTPATIENT
Start: 2022-03-03 | End: 2022-03-03

## 2022-03-03 RX ORDER — CLOPIDOGREL BISULFATE 75 MG/1
75 TABLET ORAL DAILY
Qty: 90 TABLET | Refills: 3 | Status: SHIPPED | OUTPATIENT
Start: 2022-03-03

## 2022-03-03 RX ORDER — ATORVASTATIN CALCIUM 40 MG/1
40 TABLET, FILM COATED ORAL
Qty: 90 TABLET | Refills: 3 | Status: SHIPPED | OUTPATIENT
Start: 2022-03-03

## 2022-03-03 RX ORDER — FERROUS SULFATE 325(65) MG
325 TABLET ORAL EVERY OTHER DAY
Qty: 30 TABLET | Refills: 0 | Status: ON HOLD | OUTPATIENT
Start: 2022-03-03 | End: 2022-03-28 | Stop reason: ALTCHOICE

## 2022-03-03 RX ORDER — SODIUM CHLORIDE 9 MG/ML
125 INJECTION, SOLUTION INTRAVENOUS CONTINUOUS
Status: CANCELLED | OUTPATIENT
Start: 2022-03-03

## 2022-03-07 ENCOUNTER — PREP FOR PROCEDURE (OUTPATIENT)
Dept: CARDIOLOGY CLINIC | Facility: CLINIC | Age: 72
End: 2022-03-07

## 2022-03-07 ENCOUNTER — TELEPHONE (OUTPATIENT)
Dept: CARDIOLOGY CLINIC | Facility: CLINIC | Age: 72
End: 2022-03-07

## 2022-03-07 DIAGNOSIS — I50.20 SYSTOLIC CONGESTIVE HEART FAILURE, UNSPECIFIED HF CHRONICITY (HCC): Primary | ICD-10-CM

## 2022-03-07 NOTE — TELEPHONE ENCOUNTER
Patient scheduled for PCI on 3/28/22 in [facility] with Dr Leslie Gaines  Instructions sent to patient through [address]  Medication hold Metformin and Lasix  Patient has Medicare as primary insurance

## 2022-03-16 ENCOUNTER — PATIENT OUTREACH (OUTPATIENT)
Dept: CASE MANAGEMENT | Facility: OTHER | Age: 72
End: 2022-03-16

## 2022-03-16 NOTE — PROGRESS NOTES
I spoke with patient who is at home doing well  He denies chest pain or shortness of breath  He is scheduled for a PCI with Dr Justen Vega at [facility] on 3/28  He is aware and knows he will receive a call with instructions when to be at the hospital the day before I advised him to call me if I can be of assistance  I will call patient after the procedure

## 2022-03-21 ENCOUNTER — APPOINTMENT (OUTPATIENT)
Dept: LAB | Facility: HOSPITAL | Age: 72
End: 2022-03-21
Payer: MEDICARE

## 2022-03-21 DIAGNOSIS — I50.20 SYSTOLIC CONGESTIVE HEART FAILURE, UNSPECIFIED HF CHRONICITY (HCC): ICD-10-CM

## 2022-03-21 LAB
ALBUMIN SERPL BCP-MCNC: 3.6 G/DL (ref 3.5–5)
ALP SERPL-CCNC: 77 U/L (ref 46–116)
ALT SERPL W P-5'-P-CCNC: 35 U/L (ref 12–78)
ANION GAP SERPL CALCULATED.3IONS-SCNC: 11 MMOL/L (ref 4–13)
AST SERPL W P-5'-P-CCNC: 26 U/L (ref 5–45)
BASOPHILS # BLD AUTO: 0.04 THOUSANDS/ΜL (ref 0–0.1)
BASOPHILS NFR BLD AUTO: 1 % (ref 0–1)
BILIRUB SERPL-MCNC: 0.4 MG/DL (ref 0.2–1)
BUN SERPL-MCNC: 19 MG/DL (ref 5–25)
CALCIUM SERPL-MCNC: 8.8 MG/DL (ref 8.3–10.1)
CHLORIDE SERPL-SCNC: 107 MMOL/L (ref 100–108)
CO2 SERPL-SCNC: 24 MMOL/L (ref 21–32)
CREAT SERPL-MCNC: 0.64 MG/DL (ref 0.6–1.3)
EOSINOPHIL # BLD AUTO: 0.35 THOUSAND/ΜL (ref 0–0.61)
EOSINOPHIL NFR BLD AUTO: 6 % (ref 0–6)
ERYTHROCYTE [DISTWIDTH] IN BLOOD BY AUTOMATED COUNT: 14.3 % (ref 11.6–15.1)
GFR SERPL CREATININE-BSD FRML MDRD: 99 ML/MIN/1.73SQ M
GLUCOSE SERPL-MCNC: 149 MG/DL (ref 65–140)
HCT VFR BLD AUTO: 33.9 % (ref 36.5–49.3)
HGB BLD-MCNC: 11 G/DL (ref 12–17)
IMM GRANULOCYTES # BLD AUTO: 0.02 THOUSAND/UL (ref 0–0.2)
IMM GRANULOCYTES NFR BLD AUTO: 0 % (ref 0–2)
LYMPHOCYTES # BLD AUTO: 1.16 THOUSANDS/ΜL (ref 0.6–4.47)
LYMPHOCYTES NFR BLD AUTO: 18 % (ref 14–44)
MCH RBC QN AUTO: 28.4 PG (ref 26.8–34.3)
MCHC RBC AUTO-ENTMCNC: 32.4 G/DL (ref 31.4–37.4)
MCV RBC AUTO: 87 FL (ref 82–98)
MONOCYTES # BLD AUTO: 0.61 THOUSAND/ΜL (ref 0.17–1.22)
MONOCYTES NFR BLD AUTO: 10 % (ref 4–12)
NEUTROPHILS # BLD AUTO: 4.11 THOUSANDS/ΜL (ref 1.85–7.62)
NEUTS SEG NFR BLD AUTO: 65 % (ref 43–75)
NRBC BLD AUTO-RTO: 0 /100 WBCS
PLATELET # BLD AUTO: 251 THOUSANDS/UL (ref 149–390)
PMV BLD AUTO: 10.3 FL (ref 8.9–12.7)
POTASSIUM SERPL-SCNC: 3.7 MMOL/L (ref 3.5–5.3)
PROT SERPL-MCNC: 7.4 G/DL (ref 6.4–8.2)
RBC # BLD AUTO: 3.88 MILLION/UL (ref 3.88–5.62)
SODIUM SERPL-SCNC: 142 MMOL/L (ref 136–145)
WBC # BLD AUTO: 6.29 THOUSAND/UL (ref 4.31–10.16)

## 2022-03-21 PROCEDURE — 36415 COLL VENOUS BLD VENIPUNCTURE: CPT

## 2022-03-21 PROCEDURE — 85025 COMPLETE CBC W/AUTO DIFF WBC: CPT

## 2022-03-21 PROCEDURE — 80053 COMPREHEN METABOLIC PANEL: CPT

## 2022-03-25 NOTE — PROGRESS NOTES
Pt called and made aware of 0700 arrival time at [facility]  Pt aware he is to be NPO but that he can take all of his meds except Lasix that a m  Pt aware he will be staying overnight, denies s/s of Covid, recent exposure to Covid, or recent travel out of the area

## 2022-03-28 ENCOUNTER — HOSPITAL ENCOUNTER (OUTPATIENT)
Facility: HOSPITAL | Age: 72
Setting detail: OUTPATIENT SURGERY
Discharge: HOME | End: 2022-03-28
Attending: INTERNAL MEDICINE | Admitting: INTERNAL MEDICINE
Payer: MEDICARE

## 2022-03-28 VITALS
HEART RATE: 85 BPM | OXYGEN SATURATION: 98 % | BODY MASS INDEX: 28.17 KG/M2 | SYSTOLIC BLOOD PRESSURE: 108 MMHG | RESPIRATION RATE: 18 BRPM | TEMPERATURE: 97.5 F | HEIGHT: 64 IN | DIASTOLIC BLOOD PRESSURE: 55 MMHG | WEIGHT: 165 LBS

## 2022-03-28 DIAGNOSIS — I25.10 CORONARY ARTERY DISEASE INVOLVING NATIVE CORONARY ARTERY OF NATIVE HEART WITHOUT ANGINA PECTORIS: ICD-10-CM

## 2022-03-28 DIAGNOSIS — N18.9 CKD (CHRONIC KIDNEY DISEASE): Primary | ICD-10-CM

## 2022-03-28 LAB
KCT BLD-ACNC: 349 SEC (ref 89–137)
SPECIMEN SOURCE: ABNORMAL

## 2022-03-28 PROCEDURE — C9600 PERC DRUG-EL COR STENT SING: HCPCS | Performed by: INTERNAL MEDICINE

## 2022-03-28 PROCEDURE — 99152 MOD SED SAME PHYS/QHP 5/>YRS: CPT | Performed by: INTERNAL MEDICINE

## 2022-03-28 PROCEDURE — 92928 PRQ TCAT PLMT NTRAC ST 1 LES: CPT | Performed by: INTERNAL MEDICINE

## 2022-03-28 PROCEDURE — C1769 GUIDE WIRE: HCPCS | Performed by: INTERNAL MEDICINE

## 2022-03-28 PROCEDURE — C1894 INTRO/SHEATH, NON-LASER: HCPCS | Performed by: INTERNAL MEDICINE

## 2022-03-28 PROCEDURE — NC001 PR NO CHARGE: Performed by: INTERNAL MEDICINE

## 2022-03-28 PROCEDURE — C1887 CATHETER, GUIDING: HCPCS | Performed by: INTERNAL MEDICINE

## 2022-03-28 PROCEDURE — C1874 STENT, COATED/COV W/DEL SYS: HCPCS | Performed by: INTERNAL MEDICINE

## 2022-03-28 PROCEDURE — 93454 CORONARY ARTERY ANGIO S&I: CPT | Performed by: INTERNAL MEDICINE

## 2022-03-28 PROCEDURE — 99153 MOD SED SAME PHYS/QHP EA: CPT | Performed by: INTERNAL MEDICINE

## 2022-03-28 PROCEDURE — C1725 CATH, TRANSLUMIN NON-LASER: HCPCS | Performed by: INTERNAL MEDICINE

## 2022-03-28 PROCEDURE — 85347 COAGULATION TIME ACTIVATED: CPT

## 2022-03-28 DEVICE — XIENCE SKYPOINT™ EVEROLIMUS ELUTING CORONARY STENT SYSTEM 3.50 MM X 23 MM / RAPID-EXCHANGE
Type: IMPLANTABLE DEVICE | Site: CORONARY | Status: FUNCTIONAL
Brand: XIENCE SKYPOINT™

## 2022-03-28 RX ORDER — ACETAMINOPHEN 325 MG/1
650 TABLET ORAL EVERY 4 HOURS PRN
Status: DISCONTINUED | OUTPATIENT
Start: 2022-03-28 | End: 2022-03-28 | Stop reason: HOSPADM

## 2022-03-28 RX ORDER — FENTANYL CITRATE 50 UG/ML
INJECTION, SOLUTION INTRAMUSCULAR; INTRAVENOUS AS NEEDED
Status: DISCONTINUED | OUTPATIENT
Start: 2022-03-28 | End: 2022-03-28 | Stop reason: HOSPADM

## 2022-03-28 RX ORDER — SODIUM CHLORIDE 9 MG/ML
100 INJECTION, SOLUTION INTRAVENOUS CONTINUOUS
Status: DISCONTINUED | OUTPATIENT
Start: 2022-03-28 | End: 2022-03-28 | Stop reason: HOSPADM

## 2022-03-28 RX ORDER — HEPARIN SODIUM 1000 [USP'U]/ML
INJECTION, SOLUTION INTRAVENOUS; SUBCUTANEOUS AS NEEDED
Status: DISCONTINUED | OUTPATIENT
Start: 2022-03-28 | End: 2022-03-28 | Stop reason: HOSPADM

## 2022-03-28 RX ORDER — NITROGLYCERIN 0.4 MG/1
0.4 TABLET SUBLINGUAL
Status: DISCONTINUED | OUTPATIENT
Start: 2022-03-28 | End: 2022-03-28 | Stop reason: HOSPADM

## 2022-03-28 RX ORDER — SODIUM CHLORIDE 9 MG/ML
125 INJECTION, SOLUTION INTRAVENOUS CONTINUOUS
Status: DISCONTINUED | OUTPATIENT
Start: 2022-03-28 | End: 2022-03-28

## 2022-03-28 RX ORDER — NITROGLYCERIN 20 MG/100ML
INJECTION INTRAVENOUS AS NEEDED
Status: DISCONTINUED | OUTPATIENT
Start: 2022-03-28 | End: 2022-03-28 | Stop reason: HOSPADM

## 2022-03-28 RX ORDER — MIDAZOLAM HYDROCHLORIDE 2 MG/2ML
INJECTION, SOLUTION INTRAMUSCULAR; INTRAVENOUS AS NEEDED
Status: DISCONTINUED | OUTPATIENT
Start: 2022-03-28 | End: 2022-03-28 | Stop reason: HOSPADM

## 2022-03-28 RX ORDER — VERAPAMIL HCL 2.5 MG/ML
AMPUL (ML) INTRAVENOUS AS NEEDED
Status: DISCONTINUED | OUTPATIENT
Start: 2022-03-28 | End: 2022-03-28 | Stop reason: HOSPADM

## 2022-03-28 RX ORDER — ASPIRIN 81 MG/1
324 TABLET, CHEWABLE ORAL ONCE
Status: COMPLETED | OUTPATIENT
Start: 2022-03-28 | End: 2022-03-28

## 2022-03-28 RX ORDER — ONDANSETRON 2 MG/ML
4 INJECTION INTRAMUSCULAR; INTRAVENOUS EVERY 6 HOURS PRN
Status: DISCONTINUED | OUTPATIENT
Start: 2022-03-28 | End: 2022-03-28 | Stop reason: HOSPADM

## 2022-03-28 RX ORDER — LIDOCAINE HYDROCHLORIDE 10 MG/ML
INJECTION, SOLUTION EPIDURAL; INFILTRATION; INTRACAUDAL; PERINEURAL AS NEEDED
Status: DISCONTINUED | OUTPATIENT
Start: 2022-03-28 | End: 2022-03-28 | Stop reason: HOSPADM

## 2022-03-28 RX ADMIN — SODIUM CHLORIDE 125 ML/HR: 0.9 INJECTION, SOLUTION INTRAVENOUS at 07:22

## 2022-03-28 RX ADMIN — ASPIRIN 81 MG CHEWABLE TABLET 324 MG: 81 TABLET CHEWABLE at 07:21

## 2022-03-28 NOTE — PROGRESS NOTES
Dans care assumed by CK RN at bedside  Patient without complaints  Assisted to bed with smooth   Side rails up, call bell in reach  TR band intact to right radial access site  No bleeding or swelling noted  Hand warm with good cap refill

## 2022-03-28 NOTE — DISCHARGE INSTRUCTIONS
1  Please see the post cardiac catheterization dishcarge instructions  No heavy lifting, greater than 10 lbs  or strenuous  activity for 48 hrs  2 Remove band aid tomorrow  Shower and wash area- wrist gently with soap and water- beginning tomorrow  Rinse and pat dry  Apply new water seal band aid  Repeat this process for 5 days  No powders, creams lotions or antibiotic ointments  for 5 days  No tub baths, hot tubs or swimming for 5 days  3  Please call our office ([phone]) if you have any fever, redness, swelling, discharge from your wrist access site  4 No driving for 1 day    Coronary Intravascular Stent Placement   WHAT YOU SHOULD KNOW:   Coronary intravascular stent placement is a procedure to place a stent in an artery of your heart that has plaque buildup  Plaque is a mixture of fat and cholesterol  A stent is a small mesh tube made of metal that helps keep your artery open  Your caregiver may place a bare metal stent or a drug-eluting stent (FABIOLA) in your artery  A FABIOLA is coated with medicine that is slowly released and helps prevent more plaque buildup in the area where the stent is placed  The stent remains in your artery for life  You may need more than one stent  CARE AGREEMENT:   You have the right to help plan your care  Learn about your health condition and how it may be treated  Discuss treatment options with your caregivers to decide what care you want to receive  You always have the right to refuse treatment  RISKS:   · You may develop a hematoma (swelling caused by collection of blood) or bleed more than expected from your catheter site  The dye used during this procedure may cause an allergic reaction or kidney problems  You may develop an infection  · Your artery may be injured when the catheter is inserted  During or after surgery, blood clots may form, or plaque may break off  The blood clot or plaque may block your artery and cause a heart attack or stroke   The stent could collapse, or a clot could form on the stent  This could cause the artery to become blocked again  You may need another procedure to open your artery  If you do not have this procedure, plaque may continue to build up in your arteries  This can cause a heart attack or stroke  WHILE YOU ARE HERE:   Before the procedure:   · Informed consent  is a legal document that explains the tests, treatments, or procedures that you may need  Informed consent means you understand what will be done and can make decisions about what you want  You give your permission when you sign the consent form  You can have someone sign this form for you if you are not able to sign it  You have the right to understand your medical care in words you know  Before you sign the consent form, understand the risks and benefits of what will be done  Make sure all your questions are answered  · Blood tests  are done to give caregivers information about how your body is working  The blood may be taken from your hand, arm, or IV  · Heart monitoring  is also called an ECG or EKG  Sticky pads placed on your skin record your heart's electrical activity  · An IV  is a small tube placed in your vein that is used to give you medicine or liquids  · A sedative  will be given to you right before the procedure  This medicine may make you feel sleepy and more relaxed  · Blood thinner medicine  will be given to prevent clots from forming during and after the procedure  During the procedure:   · You will receive local anesthesia that will numb the area where the catheter will be placed  You will be awake during the procedure so that your caregivers can give you instructions  You may be asked to cough or hold your breath during the procedure  · A catheter (long, thin tube) is put into an artery, usually in your groin  The catheter is gently guided through this artery to your heart and into the narrowed or blocked artery   Caregivers will use x-rays and dye to find the area where the stent needs to be placed  You may feel warm as the dye is put into the catheter  A guidewire is then placed into the catheter  The balloon catheter is guided into the narrow or blocked artery using the guidewire  Caregivers inflate the balloon several times for short periods  The inflated balloon pushes the plaque against the artery walls  This opens them and allows more blood flow to your heart  Another balloon catheter with a stent is then inserted into the artery  The balloon is inflated  This expands the stent and pushes it into place against the artery wall  The stent will be left in your artery to help keep it open  After the procedure: The catheter and guidewire will be taken out of the artery and a pressure bandage will be put on the area  Your caregiver may apply a collagen plug or other closure device to stop the bleeding  Caregivers will put pressure on the bandaged area to help stop bleeding  They may use their fingers or a mechanical device to apply the pressure  You will be taken to a room to rest  Caregivers will monitor you closely for any problems  When your caregiver sees that you are okay, you will be taken to your hospital room  You may need to lie still and flat for 3 to 6 hours after the procedure to prevent bleeding  Do not get out of bed  until your caregiver says it is okay  © 2014 [address] [address] [employer] is for End User's use only and may not be sold, redistributed or otherwise used for commercial purposes  All illustrations and images included in CareNotes® are the copyrighted property of [employer]  or [employer]  The above information is an  only  It is not intended as medical advice for individual conditions or treatments  Talk to your doctor, nurse or pharmacist before following any medical regimen to see if it is safe and effective for you

## 2022-03-28 NOTE — DISCHARGE SUMMARY
Discharge Summary - Vonnie Mayer 81 y o  male MRN: [id number]    Admission Date: 3/28/2022     Date of Discharge: 3/28/2022    Admitting Diagnosis: Coronary artery disease involving native coronary artery of native heart without angina pectoris [I25 10]    Secondary Diagnoses: Chronic heart failure, hypertension, hyperlipidemia    Discharge Diagnosis: Coronary artery disease    Cardiologist: Don Blue MD    Office Cardiologist:     PCP: Johnny Villanueva primary care provider on file  Hospital Course: Mr Reshma Mitchell presented for a planned staged PCI of the circumflex artery following stenting of his culprit LAD lesion on a prior admission  The procedure was performed successfully and he was discharged home the same day with no complications  Procedures Performed:   Orders Placed This Encounter   Procedures    Cardiac catheterization       Complications: none      Discharge instructions/Information to patient and family:   See after visit summary for information provided to patient and family  Provisions for Follow-Up Care: Outpatient cardiology follow up       Disposition: Home      Planned Readmission: No    Discharge Statement   I spent 20 minutes discharging the patient  This time was spent on the day of discharge  I had direct contact with the patient on the day of discharge  Additional documentation is required if more than 30 minutes were spent on discharge  Discharge Medications:  Please refer to medication discharge record for current medications  Robi Murphy

## 2022-03-28 NOTE — INTERVAL H&P NOTE
/76 (BP Location: Right arm)   Pulse 88   Temp 98 3 °F (36 8 °C) (Oral)   Resp 18   Ht 5' 4" (1 626 m)   Wt 74 8 kg (165 lb)   SpO2 100%   BMI 28 32 kg/m²    H&P reviewed  After examining the patient, I find no changed to the H&P since it had been written  Patient re-evaluated   Accept as history and physical     Mohit Warren MD/March 29, 2022/7:34 AM

## 2022-03-31 ENCOUNTER — PATIENT OUTREACH (OUTPATIENT)
Dept: CASE MANAGEMENT | Facility: OTHER | Age: 72
End: 2022-03-31

## 2022-03-31 NOTE — PROGRESS NOTES
Patient had a planned PCI done 3/28 at [facility]  I spoke with patient who feels well  He is asymptomatic  He scheduled a cardiology follow up in the office on 4/7  He has no questions concerning his discharge instructions  No changes to his medications he reports  The BPCI episode was closed due to being ineligible   I am removing myself from the care team

## 2022-04-07 ENCOUNTER — OFFICE VISIT (OUTPATIENT)
Dept: CARDIOLOGY CLINIC | Facility: HOSPITAL | Age: 72
End: 2022-04-07
Payer: MEDICARE

## 2022-04-07 VITALS
BODY MASS INDEX: 28.32 KG/M2 | DIASTOLIC BLOOD PRESSURE: 70 MMHG | HEART RATE: 90 BPM | SYSTOLIC BLOOD PRESSURE: 122 MMHG | HEIGHT: 64 IN

## 2022-04-07 DIAGNOSIS — I25.10 CORONARY ARTERY DISEASE INVOLVING NATIVE CORONARY ARTERY OF NATIVE HEART WITHOUT ANGINA PECTORIS: ICD-10-CM

## 2022-04-07 DIAGNOSIS — I20.9 ANGINA PECTORIS, UNSPECIFIED (HCC): ICD-10-CM

## 2022-04-07 DIAGNOSIS — I42.8 OTHER CARDIOMYOPATHY (HCC): ICD-10-CM

## 2022-04-07 DIAGNOSIS — I73.9 PAD (PERIPHERAL ARTERY DISEASE) (HCC): Primary | ICD-10-CM

## 2022-04-07 PROBLEM — I42.9 MYOCARDIOPATHY (HCC): Status: ACTIVE | Noted: 2022-04-07

## 2022-04-07 PROCEDURE — 99214 OFFICE O/P EST MOD 30 MIN: CPT | Performed by: INTERNAL MEDICINE

## 2022-04-07 RX ORDER — LOSARTAN POTASSIUM 25 MG/1
25 TABLET ORAL DAILY
Qty: 30 TABLET | Refills: 3 | Status: SHIPPED | OUTPATIENT
Start: 2022-04-07

## 2022-04-07 NOTE — PROGRESS NOTES
Cardiology Follow Up    Leonid Donis  1950  [id number]  [address]  [address]  [place] [address]  [phone]    1  PAD (peripheral artery disease) ([facility] [address] [address] )     2  Coronary artery disease involving native coronary artery of native heart without angina pectoris     3  Other cardiomyopathy ([facility] )         Diagnoses and all orders for this visit:    PAD (peripheral artery disease) ([facility] )    Coronary artery disease involving native coronary artery of native heart without angina pectoris    Other cardiomyopathy ([facility] )      I had the pleasure of seeing Leonid Donis for a follow up visit  INTERVAL HISTORY: none    History of the presenting illness, Discussion/Summary and My Plan are as follows:::    He is a 79-year-old gentleman with hypertension, dyslipidemia, paraplegia from a prior accident, no prior drug or alcohol use, remote tobacco use, no malignancies or chemotherapy, no palpitations, family history of premature vascular disease in both parents, [place] in January 2022 at which time he was diagnosed with a cardiomyopathy-EF 40% and discharged home  He was admitted with heart failure and transferred to [facility] very underwent coronary angiography that showed triple-vessel coronary artery disease, turned down for CABG, underwent PCI of the LAD system in February 2022 and staged PCI of the circumflex system in March 2022  Life vest was offered, deferred in the hospital     He has been on Lasix 40 mg since discharge, edema has been more or less stable, has tried 60 mg but had caused a lot of distress with him having to self-catheterize many more times than his baseline  He has also been experiencing itching that is more or less generalized, with a maculopapular erythematous rash-mostly on his arms and lateral chest, not on his back that he wonders if this could be a drug reaction  Plan:    Cardiomyopathy and chronic systolic and diastolic congestive heart failure:  Continue Lasix 40 mg daily, ideally could increase it to 60 mg daily but because of the need to self-catheterize many more times, was having a lot of pain and it is reasonable to hold off  Lungs are clear to auscultation and no orthopnea  Since his blood pressures have improved, were borderline in the hospital, will continue his beta-blocker and add losartan and low-dose 25 mg daily  Differential diagnosis for the cardiomyopathy-ischemic versus COVID related versus mixed  At his next visit if he is stable, losartan should be up titrated further and see below for statin    Coronary artery disease:  Staged PCI of LAD and circumflex systems-February-March 2022  Continue dual antiplatelet therapy  No chest pains or anginal symptoms  Hypertension:  Was mild at baseline, now controlled      Dyslipidemia:   he is on atorvastatin but because of his itching, will discontinue for 3 weeks and see if it improves, if his itching continues, he will continue the same if it improves will switch to a different statin-rosuvastatin 20 mg     Pruritic rash: At this point, cannot exclude the possibility of for drug rash but would not discontinue Plavix or aspirin at this time, will simply start with atorvastatin holiday  A Medrol Dosepak was offered, he would like to hold off and this is reasonable  Using p r n  Benadryl  Paraplegia:  Chronic, self catheterizes about 5 to 6 times a day  Still fairly functional     Advised him to get a PCP also  Follow-up in 2 months      Cardiomyopathy-coronary angiography - -ultimately leading to LAD PCI in circumflex PCI  Left Main   The vessel is moderate in size  There is mild diffuse disease throughout the vessel  Left Anterior Descending   The vessel is moderate in size  There is moderate diffuse disease throughout the vessel  Prox LAD lesion is 90% stenosed  AMBER flow is 2   The lesion is type C and tubular  Mid LAD lesion is 70% stenosed  First Diagonal Branch   1st Diag lesion is 99% stenosed  The lesion is tubular  Ramus Intermedius   Ramus lesion is 100% stenosed  Left Circumflex   The vessel is moderate in size  There is moderate diffuse disease throughout the vessel  Prox Cx lesion is 90% stenosed  Right Coronary Artery   The vessel is moderate in size and dominant  Collaterals   Prox RCA filled by collaterals from Prox Cx  The collateral flow was limited  Ost RCA to Prox RCA lesion is 100% stenosed  The lesion is chronically occluded  Third Right Posterolateral Branch   Collaterals   3rd RPL filled by collaterals from 3rd Mrg  The collateral flow was moderate  Results for Obrien Pod (MRN [id number]) as of 4/7/2022 14:30   Ref  Range 3/3/2022 14:05 3/21/2022 14:25   BUN Latest Ref Range: 5 - 25 mg/dL 20 19   Creatinine Latest Ref Range: 0 60 - 1 30 mg/dL 0 75 0 64     Results for Obrien Pod (MRN [id number]) as of 4/7/2022 14:30   Ref  Range 1/6/2022 04:32 2/16/2022 06:09   Cholesterol Latest Ref Range: See Comment mg/dL 184 190   Triglycerides Latest Ref Range: See Comment mg/dL 64 133   HDL Latest Ref Range: >=40 mg/dL 30 (L) 28 (L)   Non-HDL Cholesterol Latest Units: mg/dl 154    LDL Calculated Latest Ref Range: 0 - 100 mg/dL 141 (H) 135 (H)   Results for Obrien Pod (MRN [id number]) as of 4/7/2022 14:30   Ref   Range 1/6/2022 04:32   Hemoglobin A1C  6 5 (H)       Patient Active Problem List   Diagnosis    Paraplegia ([facility] )    Pneumonia due to COVID-19 virus    Skin ulcer of toe of left foot with fat layer exposed ([facility] )    Skin ulcer of toe of right foot with fat layer exposed ([facility] )    PAD (peripheral artery disease) ([facility] )    Acute respiratory failure with hypoxia ([facility] )    Elevated troponin    Neurogenic bladder    Other constipation    Transaminitis    Elevated CPK    Acute on chronic systolic congestive heart failure ([facility] )    Acute on chronic respiratory failure with hypoxia (HCC)    Hemoptysis    Type 2 diabetes mellitus (HCC)    Iron deficiency anemia    Coronary artery disease involving native coronary artery of native heart without angina pectoris    Myocardiopathy ([facility] )     Past Medical History:   Diagnosis Date    CHF (congestive heart failure) (HCC)     COVID     Paraplegia (HCC)     x30 years     Social History     Socioeconomic History    Marital status: /Civil Union     Spouse name: Not on file    Number of children: Not on file    Years of education: Not on file    Highest education level: Not on file   Occupational History    Not on file   Tobacco Use    Smoking status: Never Smoker    Smokeless tobacco: Never Used   Vaping Use    Vaping Use: Never used   Substance and Sexual Activity    Alcohol use: Never     Alcohol/week: 0 0 standard drinks    Drug use: Never    Sexual activity: Not on file   Other Topics Concern    Not on file   Social History Narrative    Not on file     Social Determinants of Health     Financial Resource Strain: Not on file   Food Insecurity: No Food Insecurity    Worried About Running Out of Food in the Last Year: Never true    [place] of Food in the Last Year: Never true   Transportation Needs: No Transportation Needs    Lack of Transportation (Medical): No    Lack of Transportation (Non-Medical):  No   Physical Activity: Not on file   Stress: Not on file   Social Connections: Not on file   Intimate Partner Violence: Not on file   Housing Stability: Low Risk     Unable to Pay for Housing in the Last Year: No    Number of Places Lived in the Last Year: 1    Unstable Housing in the Last Year: No      Family History   Problem Relation Age of Onset    Cancer Mother     Stroke Mother     Cancer Father     Alcohol abuse Father      Past Surgical History:   Procedure Laterality Date    BACK SURGERY      CARDIAC CATHETERIZATION N/A 2/16/2022    Procedure: CARDIAC CORONARY ANGIOGRAM;  Surgeon: Jr Berry MD;  [facility]; Service: Cardiology    CARDIAC CATHETERIZATION Left 2/16/2022    Procedure: Cardiac Left Heart Cath;  Surgeon: Jr Berry MD;  [facility]; Service: Cardiology    CARDIAC CATHETERIZATION N/A 2/18/2022    Procedure: Cardiac pci;  Surgeon: Jr Berry MD;  [facility]; Service: Cardiology    CARDIAC CATHETERIZATION N/A 3/28/2022    Procedure: CARDIAC PCI;  Surgeon: Jr Berry MD;  [facility]; Service: Cardiology    CARDIAC CATHETERIZATION N/A 3/28/2022    Procedure: Cardiac Coronary Angiogram;  Surgeon: Jr Berry MD;  [facility]; Service: Cardiology       Current Outpatient Medications:     aspirin (ECOTRIN LOW STRENGTH) 81 mg EC tablet, Take 1 tablet (81 mg total) by mouth daily, Disp: 90 tablet, Rfl: 3    atorvastatin (LIPITOR) 40 mg tablet, Take 1 tablet (40 mg total) by mouth daily with dinner, Disp: 90 tablet, Rfl: 3    clopidogrel (PLAVIX) 75 mg tablet, Take 1 tablet (75 mg total) by mouth daily, Disp: 90 tablet, Rfl: 3    cyanocobalamin (VITAMIN B-12) 100 MCG tablet, Take 100 mcg by mouth daily, Disp: , Rfl:     furosemide (LASIX) 40 mg tablet, Take 1 tablet (40 mg total) by mouth daily, Disp: 90 tablet, Rfl: 3    metoprolol succinate (TOPROL-XL) 50 mg 24 hr tablet, Take 1 tablet (50 mg total) by mouth 2 (two) times a day, Disp: 180 tablet, Rfl: 3    nitroglycerin (NITROSTAT) 0 4 mg SL tablet, Place 1 tablet (0 4 mg total) under the tongue every 5 (five) minutes as needed for chest pain, Disp: 9 tablet, Rfl: 0    albuterol (PROVENTIL HFA,VENTOLIN HFA) 90 mcg/act inhaler, Inhale 2 puffs every 4 (four) hours as needed for wheezing (Patient not taking: Reported on 3/3/2022 ), Disp: 18 g, Rfl: 0  No Known Allergies    Imaging: Cardiac catheterization    Result Date: 3/28/2022  Narrative: · Prox Cx lesion is 90% stenosed   · 2nd Mrg lesion is 50% stenosed  · 1st Diag lesion is 90% stenosed  Review of Systems:  Review of Systems   Constitutional: Negative  HENT: Negative  Eyes: Negative  Respiratory: Negative  Cardiovascular: Negative  Endocrine: Negative  Musculoskeletal: Negative  Physical Exam:  /70   Pulse 90   Ht 5' 4" (1 626 m)   BMI 28 32 kg/m²   Physical Exam  Constitutional:       General: He is not in acute distress  Appearance: Normal appearance  He is not ill-appearing  HENT:      Nose: No congestion or rhinorrhea  Mouth/Throat:      Mouth: Mucous membranes are moist       Pharynx: Oropharynx is clear  No oropharyngeal exudate  Eyes:      General:         Right eye: No discharge  Conjunctiva/sclera: Conjunctivae normal       Pupils: Pupils are equal, round, and reactive to light  Cardiovascular:      Rate and Rhythm: Normal rate  Pulses: Normal pulses  Heart sounds: No murmur heard  No friction rub  Pulmonary:      Effort: Pulmonary effort is normal  No respiratory distress  Breath sounds: No stridor  No wheezing or rhonchi  Abdominal:      General: Abdomen is flat  Bowel sounds are normal  There is no distension  Palpations: There is no mass  Hernia: No hernia is present  Musculoskeletal:         General: No swelling, tenderness, deformity or signs of injury  Normal range of motion  Cervical back: Normal range of motion  Skin:     General: Skin is warm  Coloration: Skin is not jaundiced or pale  Findings: No bruising or erythema  Neurological:      Mental Status: He is alert  This note was completed in part utilizing m[employer] direct voice recognition software  Grammatical errors, random word insertion, spelling mistakes, occasional wrong word or "sound-alike" substitutions and incomplete sentences may be an occasional consequence of the system secondary to software limitations, ambient noise and hardware issues  At the time of dictation, efforts were made to edit, clarify and /or correct errors  Please read the chart carefully and recognize, using context, where substitutions have occurred  If you have any questions or concerns about the context, text or information contained within the body of this dictation, please contact myself, the provider, for further clarification

## 2022-06-07 NOTE — PROGRESS NOTES
Cardiology Follow Up    Vonnie Mayer  1950  [id number]  [facility] [id number]  [phone]    1  Chronic combined systolic and diastolic congestive heart failure ([facility] [address] )     2  [facility] ([facility]  Echo follow up/limited w/ contrast if indicated   3  Coronary artery disease involving native coronary artery of native heart without angina pectoris     4  Essential hypertension     5  Dyslipidemia  Lipid Panel with Direct LDL reflex    Comprehensive metabolic panel       Discussion/Summary:  Coronary artery disease: has triple vessel disease per [facility] - February 2022  Was turned down for CABG and underwent FABIOLA x2 to the proximal LAD and FABIOLA to the mid LAD and subsequentlystaged PCI to the proximal circumflex in March 2022  No symptoms of angina at this time  Continue aspirin, plavix, statin, beta-blocker       Chronic combined systolic and diastolic congestive heart failure: has bilateral lower extremity edema on exam but lungs are clear  He is taking lasix 40 mg every other day as opposed to daily as he has been having urethral irritation from frequent needs to straight cath  Recommended to attempt lasix 40 mg daily alternating with 20 mg daily if he is able to tolerate this  Discussed a low sodium diet  He is unable to weigh himself secondary to paraplegia      Ischemic cardiomyopathy: EF of 20% - February 2022  Due for repeat echocardiogram  On GDMT with metoprolol succinate and losartan  Discussed potential for ICD implantation if EF <35%  He had declined a Life Vest previously       Dyslipidemia: most recent LDL was 135 in February 2022  Needs repeat lipid panel and CMP for reassessment  He was transitioned off of atorvastatin (replaced with rosuvastatin) x 1 given concerns with itching   He is back on atorvastatin and states the itching is more tolerable       He will RTO in 3 months with Dr Petros Sorto or sooner if necessary  He will call with any concerns  Interval History:   Valorie Marcos is a 81 y o  male with multivessel coronary artery disease, ischemic cardiomyopathy with an EF of 20%, chronic combined systolic and diastolic congestive heart failure, dyslipidemia, diabetes, paraplegia who presents to the office today for routine follow up  At his last office visit losartan was added for GDMT  Since his last office visit he has been feeling about the same  He continues to have easy fatigue and becomes "winded" easily such as when wheeling himself up a slight incline  He denies any exertional chest pain/pressure/discomfort  He is unable to weigh himself  He believes his lower extremity edema is slightly worse than baseline  He feels intermittent abdominal bloating  He takes lasix 40 mg every other day  He feels this is an effective dose as he urinates frequently  He does perform straight catheterization and becomes very sore when he has to do this frequently  He denies lightheadedness, dizziness, palpitations, syncope       Medical Problems             Problem List     [facility]    Pneumonia due to COVID-19 virus    Skin ulcer of toe of left foot with fat layer exposed ([facility]    Skin ulcer of toe of right foot with fat layer exposed ([facility] [address] )    PAD (peripheral artery disease) ([facility] [address] )    Acute respiratory failure with hypoxia (HCC)    Elevated troponin    Neurogenic bladder    Other constipation    Transaminitis    Elevated CPK    Acute on chronic systolic congestive heart failure (HCC)    Wt Readings from Last 3 Encounters:   03/28/22 74 8 kg (165 lb)   02/18/22 78 5 kg (173 lb)   02/15/22 83 2 kg (183 lb 6 8 oz)                   Acute on chronic respiratory failure with hypoxia (HCC)    Hemoptysis    Type 2 diabetes mellitus ([facility] [address] )      Lab Results   Component Value Date    HGBA1C 6 5 (H) 01/06/2022           Iron deficiency anemia    Coronary artery disease involving native coronary artery of native heart without angina pectoris    Myocardiopathy (HCC)    Angina pectoris, unspecified ([facility] [facility] )              Past Medical History:   Diagnosis Date    CHF (congestive heart failure) (HCC)     COVID     Paraplegia (HCC)     x30 years     Social History     Socioeconomic History    Marital status: /Civil Union     Spouse name: Not on file    Number of children: Not on file    Years of education: Not on file    Highest education level: Not on file   Occupational History    Not on file   Tobacco Use    Smoking status: Never Smoker    Smokeless tobacco: Never Used   Vaping Use    Vaping Use: Never used   Substance and Sexual Activity    Alcohol use: Never     Alcohol/week: 0 0 standard drinks    Drug use: Never    Sexual activity: Not on file   Other Topics Concern    Not on file   Social History Narrative    Not on file     Social Determinants of Health     Financial Resource Strain: Not on file   Food Insecurity: No Food Insecurity    Worried About Running Out of Food in the Last Year: Never true    [place] of Food in the Last Year: Never true   Transportation Needs: No Transportation Needs    Lack of Transportation (Medical): No    Lack of Transportation (Non-Medical): No   Physical Activity: Not on file   Stress: Not on file   Social Connections: Not on file   Intimate Partner Violence: Not on file   Housing Stability: Low Risk     Unable to Pay for Housing in the Last Year: No    Number of Places Lived in the Last Year: 1    Unstable Housing in the Last Year: No      Family History   Problem Relation Age of Onset    Cancer Mother     Stroke Mother     Cancer Father     Alcohol abuse Father      Past Surgical History:   Procedure Laterality Date    BACK SURGERY      CARDIAC CATHETERIZATION N/A 2/16/2022    Procedure: CARDIAC CORONARY ANGIOGRAM;  Surgeon: Jonathan Marrero MD;  [facility];   Service: Cardiology    CARDIAC CATHETERIZATION Left 2/16/2022    Procedure: Cardiac Left Heart Cath;  Surgeon: Sadiq Dove MD;  [facility]; Service: Cardiology    CARDIAC CATHETERIZATION N/A 2/18/2022    Procedure: Cardiac pci;  Surgeon: Sadiq Dove MD;  [facility]; Service: Cardiology    CARDIAC CATHETERIZATION N/A 3/28/2022    Procedure: CARDIAC PCI;  Surgeon: Sadiq Dove MD;  [facility]; Service: Cardiology    CARDIAC CATHETERIZATION N/A 3/28/2022    Procedure: Cardiac Coronary Angiogram;  Surgeon: Sadiq Dove MD;  [facility];   Service: Cardiology       Current Outpatient Medications:     aspirin (ECOTRIN LOW STRENGTH) 81 mg EC tablet, Take 1 tablet (81 mg total) by mouth daily, Disp: 90 tablet, Rfl: 3    atorvastatin (LIPITOR) 40 mg tablet, Take 1 tablet (40 mg total) by mouth daily with dinner, Disp: 90 tablet, Rfl: 3    clopidogrel (PLAVIX) 75 mg tablet, Take 1 tablet (75 mg total) by mouth daily, Disp: 90 tablet, Rfl: 3    cyanocobalamin (VITAMIN B-12) 100 MCG tablet, Take 100 mcg by mouth daily, Disp: , Rfl:     furosemide (LASIX) 40 mg tablet, Take 1 tablet (40 mg total) by mouth daily (Patient taking differently: Take 40 mg by mouth every other day), Disp: 90 tablet, Rfl: 3    losartan (COZAAR) 25 mg tablet, Take 1 tablet (25 mg total) by mouth daily, Disp: 30 tablet, Rfl: 3    metoprolol succinate (TOPROL-XL) 50 mg 24 hr tablet, Take 1 tablet (50 mg total) by mouth 2 (two) times a day, Disp: 180 tablet, Rfl: 3    nitroglycerin (NITROSTAT) 0 4 mg SL tablet, Place 1 tablet (0 4 mg total) under the tongue every 5 (five) minutes as needed for chest pain, Disp: 9 tablet, Rfl: 0    albuterol (PROVENTIL HFA,VENTOLIN HFA) 90 mcg/act inhaler, Inhale 2 puffs every 4 (four) hours as needed for wheezing (Patient not taking: No sig reported), Disp: 18 g, Rfl: 0  No Known Allergies    Labs:     Chemistry        Component Value Date/Time    K 3 7 03/21/2022 1425     03/21/2022 1425    CO2 24 03/21/2022 1425    BUN 19 03/21/2022 1425    CREATININE 0 64 03/21/2022 1425        Component Value Date/Time    CALCIUM 8 8 03/21/2022 1425    ALKPHOS 77 03/21/2022 1425    AST 26 03/21/2022 1425    ALT 35 03/21/2022 1425            No results found for: CHOL  Lab Results   Component Value Date    HDL 28 (L) 02/16/2022    HDL 30 (L) 01/06/2022     Lab Results   Component Value Date    LDLCALC 135 (H) 02/16/2022    LDLCALC 141 (H) 01/06/2022     Lab Results   Component Value Date    TRIG 133 02/16/2022    TRIG 64 01/06/2022     No results found for: CHOLHDL    Imaging: No results found  Review of Systems   Cardiovascular: Positive for dyspnea on exertion and leg swelling  Negative for chest pain, paroxysmal nocturnal dyspnea and syncope  Gastrointestinal: Positive for bloating  All other systems reviewed and are negative  Vitals:    06/09/22 1315   BP: 120/80   Pulse: 88     Vitals:     Height: 5' 4" (162 6 cm)   Body mass index is 28 32 kg/m²  Physical Exam:  Physical Exam  Vitals reviewed  Constitutional:       General: He is not in acute distress  Appearance: He is well-developed  He is not diaphoretic  HENT:      Head: Normocephalic and atraumatic  Eyes:      Pupils: Pupils are equal, round, and reactive to light  Neck:      Vascular: No carotid bruit  Cardiovascular:      Rate and Rhythm: Normal rate and regular rhythm  Pulses:           Radial pulses are 2+ on the right side and 2+ on the left side  Heart sounds: S1 normal and S2 normal  No murmur heard  Pulmonary:      Effort: Pulmonary effort is normal  No respiratory distress  Breath sounds: Normal breath sounds  No wheezing or rales  Abdominal:      General: There is no distension  Palpations: Abdomen is soft  Tenderness: There is no abdominal tenderness  Musculoskeletal:         General: Normal range of motion  Cervical back: Normal range of motion  Right lower leg: Edema (+1) present  Left lower leg: Edema (+2) present  Skin:     General: Skin is warm and dry  Findings: No erythema  Neurological:      Mental Status: He is alert and oriented to person, place, and time  Gait: Gait abnormal (in wheelchair)     Psychiatric:         Mood and Affect: Mood normal          Behavior: Behavior normal

## 2022-06-09 ENCOUNTER — OFFICE VISIT (OUTPATIENT)
Dept: CARDIOLOGY CLINIC | Facility: HOSPITAL | Age: 72
End: 2022-06-09
Payer: MEDICARE

## 2022-06-09 VITALS
SYSTOLIC BLOOD PRESSURE: 120 MMHG | HEIGHT: 64 IN | BODY MASS INDEX: 28.32 KG/M2 | DIASTOLIC BLOOD PRESSURE: 80 MMHG | HEART RATE: 88 BPM

## 2022-06-09 DIAGNOSIS — I42.9 CARDIOMYOPATHY (HCC): ICD-10-CM

## 2022-06-09 DIAGNOSIS — I10 ESSENTIAL HYPERTENSION: ICD-10-CM

## 2022-06-09 DIAGNOSIS — E78.5 DYSLIPIDEMIA: ICD-10-CM

## 2022-06-09 DIAGNOSIS — I25.10 CORONARY ARTERY DISEASE INVOLVING NATIVE CORONARY ARTERY OF NATIVE HEART WITHOUT ANGINA PECTORIS: ICD-10-CM

## 2022-06-09 DIAGNOSIS — I50.42 CHRONIC COMBINED SYSTOLIC AND DIASTOLIC CONGESTIVE HEART FAILURE (HCC): Primary | ICD-10-CM

## 2022-06-09 PROCEDURE — 99214 OFFICE O/P EST MOD 30 MIN: CPT | Performed by: PHYSICIAN ASSISTANT

## 2022-07-06 ENCOUNTER — APPOINTMENT (OUTPATIENT)
Dept: LAB | Facility: HOSPITAL | Age: 72
End: 2022-07-06
Attending: INTERNAL MEDICINE
Payer: MEDICARE

## 2022-07-06 DIAGNOSIS — I42.8 OTHER CARDIOMYOPATHY (HCC): ICD-10-CM

## 2022-07-06 DIAGNOSIS — E78.5 DYSLIPIDEMIA: ICD-10-CM

## 2022-07-06 LAB
ALBUMIN SERPL BCP-MCNC: 3.9 G/DL (ref 3.5–5)
ALP SERPL-CCNC: 86 U/L (ref 46–116)
ALT SERPL W P-5'-P-CCNC: 31 U/L (ref 12–78)
ANION GAP SERPL CALCULATED.3IONS-SCNC: 12 MMOL/L (ref 4–13)
AST SERPL W P-5'-P-CCNC: 16 U/L (ref 5–45)
BILIRUB SERPL-MCNC: 0.43 MG/DL (ref 0.2–1)
BUN SERPL-MCNC: 20 MG/DL (ref 5–25)
CALCIUM SERPL-MCNC: 9.2 MG/DL (ref 8.3–10.1)
CHLORIDE SERPL-SCNC: 101 MMOL/L (ref 100–108)
CHOLEST SERPL-MCNC: 215 MG/DL
CO2 SERPL-SCNC: 26 MMOL/L (ref 21–32)
CREAT SERPL-MCNC: 0.74 MG/DL (ref 0.6–1.3)
GFR SERPL CREATININE-BSD FRML MDRD: 93 ML/MIN/1.73SQ M
GLUCOSE P FAST SERPL-MCNC: 188 MG/DL (ref 65–99)
HDLC SERPL-MCNC: 37 MG/DL
LDLC SERPL CALC-MCNC: 142 MG/DL (ref 0–100)
POTASSIUM SERPL-SCNC: 3.5 MMOL/L (ref 3.5–5.3)
PROT SERPL-MCNC: 7.7 G/DL (ref 6.4–8.2)
SODIUM SERPL-SCNC: 139 MMOL/L (ref 136–145)
TRIGL SERPL-MCNC: 181 MG/DL

## 2022-07-06 PROCEDURE — 80061 LIPID PANEL: CPT

## 2022-07-06 PROCEDURE — 36415 COLL VENOUS BLD VENIPUNCTURE: CPT | Performed by: PHYSICIAN ASSISTANT

## 2022-07-06 PROCEDURE — 80053 COMPREHEN METABOLIC PANEL: CPT | Performed by: PHYSICIAN ASSISTANT

## 2022-07-21 ENCOUNTER — HOSPITAL ENCOUNTER (OUTPATIENT)
Dept: NON INVASIVE DIAGNOSTICS | Facility: HOSPITAL | Age: 72
Discharge: HOME | End: 2022-07-21
Payer: MEDICARE

## 2022-07-21 VITALS
HEART RATE: 94 BPM | BODY MASS INDEX: 28.17 KG/M2 | DIASTOLIC BLOOD PRESSURE: 55 MMHG | SYSTOLIC BLOOD PRESSURE: 108 MMHG | HEIGHT: 64 IN | WEIGHT: 165 LBS

## 2022-07-21 DIAGNOSIS — I42.9 CARDIOMYOPATHY (HCC): ICD-10-CM

## 2022-07-21 LAB
AORTIC ROOT: 3.1 CM
APICAL FOUR CHAMBER EJECTION FRACTION: 43 %
AV LVOT MEAN GRADIENT: 2 MMHG
AV LVOT PEAK GRADIENT: 3 MMHG
AV PEAK GRADIENT: 10 MMHG
DOP CALC LVOT PEAK VEL VTI: 18.69 CM
DOP CALC LVOT PEAK VEL: 0.93 M/S
FRACTIONAL SHORTENING: 26 % (ref 28–44)
INTERVENTRICULAR SEPTUM IN DIASTOLE (PARASTERNAL SHORT AXIS VIEW): 1.2 CM
INTERVENTRICULAR SEPTUM: 1.2 CM (ref 0.6–1.1)
LEFT ATRIUM AREA SYSTOLE SINGLE PLANE A4C: 16.2 CM2
LEFT ATRIUM SIZE: 3.9 CM
LEFT INTERNAL DIMENSION IN SYSTOLE: 4 CM (ref 2.1–4)
LEFT VENTRICLE DIASTOLIC VOLUME (MOD BIPLANE): 152 ML
LEFT VENTRICLE SYSTOLIC VOLUME (MOD BIPLANE): 92 ML
LEFT VENTRICULAR INTERNAL DIMENSION IN DIASTOLE: 5.4 CM (ref 3.5–6)
LEFT VENTRICULAR POSTERIOR WALL IN END DIASTOLE: 1.2 CM
LEFT VENTRICULAR STROKE VOLUME: 72 ML
LV EF: 39 %
LVSV (TEICH): 72 ML
RIGHT ATRIUM AREA SYSTOLE A4C: 14.2 CM2
RIGHT VENTRICLE ID DIMENSION: 2.6 CM
SL CV LV EF: 35
SL CV PED ECHO LEFT VENTRICLE DIASTOLIC VOLUME (MOD BIPLANE) 2D: 144 ML
SL CV PED ECHO LEFT VENTRICLE SYSTOLIC VOLUME (MOD BIPLANE) 2D: 72 ML

## 2022-07-21 PROCEDURE — 93308 TTE F-UP OR LMTD: CPT | Performed by: INTERNAL MEDICINE

## 2022-07-21 PROCEDURE — 93321 DOPPLER ECHO F-UP/LMTD STD: CPT | Performed by: INTERNAL MEDICINE

## 2022-07-21 PROCEDURE — 93325 DOPPLER ECHO COLOR FLOW MAPG: CPT | Performed by: INTERNAL MEDICINE

## 2022-07-21 PROCEDURE — 93308 TTE F-UP OR LMTD: CPT

## 2022-07-22 DIAGNOSIS — I25.5 ISCHEMIC CARDIOMYOPATHY: Primary | ICD-10-CM

## 2022-10-12 PROBLEM — J12.82 PNEUMONIA DUE TO COVID-19 VIRUS: Status: RESOLVED | Noted: 2022-01-05 | Resolved: 2022-10-12

## 2022-10-12 PROBLEM — U07.1 PNEUMONIA DUE TO COVID-19 VIRUS: Status: RESOLVED | Noted: 2022-01-05 | Resolved: 2022-10-12

## 2022-10-24 ENCOUNTER — HOSPITAL ENCOUNTER (OUTPATIENT)
Dept: NON INVASIVE DIAGNOSTICS | Facility: HOSPITAL | Age: 72
Discharge: HOME | End: 2022-10-24
Payer: MEDICARE

## 2022-10-24 VITALS
HEIGHT: 64 IN | DIASTOLIC BLOOD PRESSURE: 70 MMHG | WEIGHT: 165 LBS | BODY MASS INDEX: 28.17 KG/M2 | HEART RATE: 100 BPM | SYSTOLIC BLOOD PRESSURE: 122 MMHG

## 2022-10-24 DIAGNOSIS — I25.5 ISCHEMIC CARDIOMYOPATHY: ICD-10-CM

## 2022-10-24 LAB
AORTIC ROOT: 3.1 CM
APICAL FOUR CHAMBER EJECTION FRACTION: 42 %
FRACTIONAL SHORTENING: 20 % (ref 28–44)
INTERVENTRICULAR SEPTUM IN DIASTOLE (PARASTERNAL SHORT AXIS VIEW): 1.2 CM
INTERVENTRICULAR SEPTUM: 1.2 CM (ref 0.6–1.1)
LEFT ATRIUM SIZE: 3.4 CM
LEFT INTERNAL DIMENSION IN SYSTOLE: 4 CM (ref 2.1–4)
LEFT VENTRICLE DIASTOLIC VOLUME (MOD BIPLANE): 136 ML
LEFT VENTRICLE SYSTOLIC VOLUME (MOD BIPLANE): 89 ML
LEFT VENTRICULAR INTERNAL DIMENSION IN DIASTOLE: 5 CM (ref 3.5–6)
LEFT VENTRICULAR POSTERIOR WALL IN END DIASTOLE: 1.3 CM
LEFT VENTRICULAR STROKE VOLUME: 49 ML
LV EF: 35 %
LVSV (TEICH): 49 ML
SL CV LV EF: 40
SL CV PED ECHO LEFT VENTRICLE DIASTOLIC VOLUME (MOD BIPLANE) 2D: 120 ML
SL CV PED ECHO LEFT VENTRICLE SYSTOLIC VOLUME (MOD BIPLANE) 2D: 72 ML

## 2022-10-24 PROCEDURE — 93308 TTE F-UP OR LMTD: CPT | Performed by: INTERNAL MEDICINE

## 2022-10-24 PROCEDURE — 93308 TTE F-UP OR LMTD: CPT

## 2022-10-27 ENCOUNTER — OFFICE VISIT (OUTPATIENT)
Dept: CARDIOLOGY CLINIC | Facility: HOSPITAL | Age: 72
End: 2022-10-27
Payer: MEDICARE

## 2022-10-27 VITALS
SYSTOLIC BLOOD PRESSURE: 141 MMHG | HEIGHT: 64 IN | WEIGHT: 165 LBS | BODY MASS INDEX: 28.17 KG/M2 | HEART RATE: 111 BPM | DIASTOLIC BLOOD PRESSURE: 72 MMHG

## 2022-10-27 DIAGNOSIS — I25.10 CORONARY ARTERY DISEASE INVOLVING NATIVE CORONARY ARTERY OF NATIVE HEART WITHOUT ANGINA PECTORIS: ICD-10-CM

## 2022-10-27 DIAGNOSIS — E78.5 DYSLIPIDEMIA, GOAL LDL BELOW 70: ICD-10-CM

## 2022-10-27 DIAGNOSIS — I10 HYPERTENSION, UNSPECIFIED TYPE: ICD-10-CM

## 2022-10-27 DIAGNOSIS — I73.9 PAD (PERIPHERAL ARTERY DISEASE) (HCC): Primary | ICD-10-CM

## 2022-10-27 DIAGNOSIS — I42.8 OTHER CARDIOMYOPATHY (HCC): ICD-10-CM

## 2022-10-27 DIAGNOSIS — G82.20 PARAPLEGIA (HCC): ICD-10-CM

## 2022-10-27 PROCEDURE — 99214 OFFICE O/P EST MOD 30 MIN: CPT | Performed by: INTERNAL MEDICINE

## 2022-10-27 RX ORDER — LOSARTAN POTASSIUM 25 MG/1
25 TABLET ORAL DAILY
Qty: 90 TABLET | Refills: 3 | Status: SHIPPED | OUTPATIENT
Start: 2022-10-27

## 2022-10-27 NOTE — PROGRESS NOTES
Cardiology Follow Up    Shira Camacho  1950  [id number]  [address]  [address]  [place] [address]  [phone]    1  PAD (peripheral artery disease) ([facility] [address] )     2  Coronary artery disease involving native coronary artery of native heart without angina pectoris     3  Paraplegia ([facility] [address] )     4  Hypertension, unspecified type     5  Dyslipidemia, goal LDL below 70         Diagnoses and all orders for this visit:    PAD (peripheral artery disease) ([facility] [address] )    Coronary artery disease involving native coronary artery of native heart without angina pectoris    [facility]    Hypertension, unspecified type    Dyslipidemia, goal LDL below 70      I had the pleasure of seeing Shira Camacho for a follow up visit  INTERVAL HISTORY: none    History of the presenting illness, Discussion/Summary and My Plan are as follows:::    He is a 82-year-old gentleman with hypertension, dyslipidemia, paraplegia from a prior accident, no prior drug or alcohol use, remote tobacco use, no malignancies or chemotherapy, no palpitations, family history of premature vascular disease in both parents, [facility] in January 2022 at which time he was diagnosed with a cardiomyopathy-EF 40% and discharged home  He was admitted with heart failure and transferred to [facility] very underwent coronary angiography that showed triple-vessel coronary artery disease, turned down for CABG, underwent PCI of the LAD system in February 2022 and staged PCI of the circumflex system in March 2022  Life vest was offered, deferred in the hospital   Echocardiogram that time showed an ejection fraction of 20%      At the last visit with me, he was on Lasix 40 mg daily, remained volume overloaded and while ideally should have been on a higher dose, the inconvenience of having to frequently self-catheterize him self, I let him be on the same dose, at his subsequent visit, for the same reasons, it was agreed that he would take Lasix 40 mg alternating with 20 mg daily  He is currently taking about 40 mg every other day, about 3 times a week  He does have significant right lower extremity edema and feels short of breath especially on the morning of Lasix days but feels great by the end of the day  Does admit to increasing shortness of breath with activity as well  On non Lasix days, he has to self-catheterize himself six to 7 times a day but on Lasix days, up to 15 times a day  Obviously this has been very inconvenient  Since his last visit, he underwent a left above knee amputation(astrid) for a nonhealing wound with severe peripheral vascular disease, preferred primary amputation as a post revascularization with his chronic/frequent ulcerations, he still has a right lower extremity 4th toe nonhealing wound for which he is seeing wound care at [facility]  Echocardiogram-October 2022, ejection fraction of 40%, global hypokinesis, no significant valvular disease  July 2022-EF 35%  February 2022-EF-20%    Plan:    Cardiomyopathy and chronic systolic and diastolic congestive heart failure:  Continue Lasix try to take 40 mg daily, ideally should increase it to 60 mg daily but because of the need to self-catheterize many more times,  Discussed the potential for a suprapubic catheter but he likes his independence and would like to hold off  He does have the option of seeing urology for another opinion  Does have significant right lower extremity edema  My concern is also that with lower extremity wounds that potentially might not heal with significant edema and with his existing peripheral vascular disease, might lead to limb loss such as his left lower extremity-now status post AKA  Lungs are clear to auscultation and no orthopnea    Blood pressures have improved, will restart losartan 25 mg daily, he took it for 3 months and did not since   This can further be increased to 50 mg daily in about a week or so  BMP in about 1 week  Yolanda Sellers is also an option but because of its diuretic effect, he would like to hold off  Coronary artery disease:  Staged PCI of LAD and circumflex systems-February-March 2022  Continue dual antiplatelet therapy  No chest pains or anginal symptoms  Hypertension:  Was mild at baseline, now controlled      Dyslipidemia: Remains on atorvastatin, tolerating it well  Paraplegia:  Chronic, self catheterizes about 5 to 6 times a day on non Lasix days and up to 15 times a day on Lasix days     Still fairly functional but feels short of breath with increased activity  Advised him to get a PCP also  Follow-up in 4 months      Cardiomyopathy-coronary angiography - -ultimately leading to LAD PCI and circumflex PCI  Left Main   The vessel is moderate in size  There is mild diffuse disease throughout the vessel  Left Anterior Descending   The vessel is moderate in size  There is moderate diffuse disease throughout the vessel  Prox LAD lesion is 90% stenosed  AMBER flow is 2  The lesion is type C and tubular  Mid LAD lesion is 70% stenosed  First Diagonal Branch   1st Diag lesion is 99% stenosed  The lesion is tubular  Ramus Intermedius   Ramus lesion is 100% stenosed  Left Circumflex   The vessel is moderate in size  There is moderate diffuse disease throughout the vessel  Prox Cx lesion is 90% stenosed  Right Coronary Artery   The vessel is moderate in size and dominant  Collaterals   Prox RCA filled by collaterals from Prox Cx  The collateral flow was limited  Ost RCA to Prox RCA lesion is 100% stenosed  The lesion is chronically occluded  Third Right Posterolateral Branch   Collaterals   3rd RPL filled by collaterals from 3rd Mrg  The collateral flow was moderate        Latest Reference Range & Units 07/06/22 10:58   Albumin 3 5 - 5 0 g/dL 3 9       Normal K, BUN, Cr in Sept 2022  Results for Vamsi Child (MRN [id number]) as of 4/7/2022 14:30   Ref  Range 3/3/2022 14:05 3/21/2022 14:25   BUN Latest Ref Range: 5 - 25 mg/dL 20 19   Creatinine Latest Ref Range: 0 60 - 1 30 mg/dL 0 75 0 64     Results for Vamsi Child (MRN [id number]) as of 4/7/2022 14:30   Ref  Range 1/6/2022 04:32 2/16/2022 06:09   Cholesterol Latest Ref Range: See Comment mg/dL 184 190   Triglycerides Latest Ref Range: See Comment mg/dL 64 133   HDL Latest Ref Range: >=40 mg/dL 30 (L) 28 (L)   Non-HDL Cholesterol Latest Units: mg/dl 154    LDL Calculated Latest Ref Range: 0 - 100 mg/dL 141 (H) 135 (H)   Results for Vamsi Child (MRN [id number]) as of 4/7/2022 14:30   Ref   Range 1/6/2022 04:32   Hemoglobin A1C  6 5 (H)       Patient Active Problem List   Diagnosis   • Paraplegia (HCC)   • Skin ulcer of toe of left foot with fat layer exposed ([address] )   • Skin ulcer of toe of right foot with fat layer exposed ([address] )   • PAD (peripheral artery disease) ([address] )   • Acute respiratory failure with hypoxia (HCC)   • Elevated troponin   • Neurogenic bladder   • Other constipation   • Transaminitis   • Elevated CPK   • Acute on chronic systolic congestive heart failure (HCC)   • Acute on chronic respiratory failure with hypoxia (HCC)   • Hemoptysis   • Type 2 diabetes mellitus ([facility])   • Iron deficiency anemia   • Coronary artery disease involving native coronary artery of native heart without angina pectoris   • Myocardiopathy ([facility])   • Angina pectoris, unspecified ([address] )   • Dyslipidemia, goal LDL below 70   • Hypertension     Past Medical History:   Diagnosis Date   • CHF (congestive heart failure) (HCC)    • COVID    • Paraplegia (HCC)     x30 years     Social History     Socioeconomic History   • Marital status: /Civil Union     Spouse name: Not on file   • Number of children: Not on file   • Years of education: Not on file   • Highest education level: Not on file   Occupational History   • Not on file   Tobacco Use   • Smoking status: Never Smoker   • Smokeless tobacco: Never Used   Vaping Use   • Vaping Use: Never used   Substance and Sexual Activity   • Alcohol use: Never     Alcohol/week: 0 0 standard drinks   • Drug use: Never   • Sexual activity: Not on file   Other Topics Concern   • Not on file   Social History Narrative   • Not on file     Social Determinants of Health     Financial Resource Strain: Not on file   Food Insecurity: No Food Insecurity   • Worried About Running Out of Food in the Last Year: Never true   • Ran Out of Food in the Last Year: Never true   Transportation Needs: No Transportation Needs   • Lack of Transportation (Medical): No   • Lack of Transportation (Non-Medical): No   Physical Activity: Not on file   Stress: Not on file   Social Connections: Not on file   Intimate Partner Violence: Not on file   Housing Stability: Low Risk    • Unable to Pay for Housing in the Last Year: No   • Number of Places Lived in the Last Year: 1   • Unstable Housing in the Last Year: No      Family History   Problem Relation Age of Onset   • Cancer Mother    • Stroke Mother    • Cancer Father    • Alcohol abuse Father      Past Surgical History:   Procedure Laterality Date   • BACK SURGERY     • CARDIAC CATHETERIZATION N/A 2/16/2022    Procedure: CARDIAC CORONARY ANGIOGRAM;  Surgeon: Jos eR Pierce MD;  [facility]; Service: Cardiology   • CARDIAC CATHETERIZATION Left 2/16/2022    Procedure: Cardiac Left Heart Cath;  Surgeon: Jose R Pierce MD;  [facility]; Service: Cardiology   • CARDIAC CATHETERIZATION N/A 2/18/2022    Procedure: Cardiac pci;  Surgeon: Jose R Pierce MD;  [facility]; Service: Cardiology   • CARDIAC CATHETERIZATION N/A 3/28/2022    Procedure: CARDIAC PCI;  Surgeon: Jose R Pierce MD;  [facility];   Service: Cardiology   • CARDIAC CATHETERIZATION N/A 3/28/2022    Procedure: Cardiac Coronary Angiogram;  Surgeon: Jose R Pierce MD; [facility]: [facility]; Service: Cardiology       Current Outpatient Medications:   •  aspirin (ECOTRIN LOW STRENGTH) 81 mg EC tablet, Take 1 tablet (81 mg total) by mouth daily, Disp: 90 tablet, Rfl: 3  •  atorvastatin (LIPITOR) 40 mg tablet, Take 1 tablet (40 mg total) by mouth daily with dinner, Disp: 90 tablet, Rfl: 3  •  clopidogrel (PLAVIX) 75 mg tablet, Take 1 tablet (75 mg total) by mouth daily, Disp: 90 tablet, Rfl: 3  •  cyanocobalamin (VITAMIN B-12) 100 MCG tablet, Take 100 mcg by mouth daily, Disp: , Rfl:   •  furosemide (LASIX) 40 mg tablet, Take 1 tablet (40 mg total) by mouth daily (Patient taking differently: Take 40 mg by mouth every other day), Disp: 90 tablet, Rfl: 3  •  losartan (COZAAR) 25 mg tablet, Take 1 tablet (25 mg total) by mouth daily, Disp: 30 tablet, Rfl: 3  •  metoprolol succinate (TOPROL-XL) 50 mg 24 hr tablet, Take 1 tablet (50 mg total) by mouth 2 (two) times a day, Disp: 180 tablet, Rfl: 3  •  nitroglycerin (NITROSTAT) 0 4 mg SL tablet, Place 1 tablet (0 4 mg total) under the tongue every 5 (five) minutes as needed for chest pain, Disp: 9 tablet, Rfl: 0  •  albuterol (PROVENTIL HFA,VENTOLIN HFA) 90 mcg/act inhaler, Inhale 2 puffs every 4 (four) hours as needed for wheezing (Patient not taking: No sig reported), Disp: 18 g, Rfl: 0  No Known Allergies    Imaging: Cardiac catheterization    Result Date: 3/28/2022  Narrative: · Prox Cx lesion is 90% stenosed  · 2nd Mrg lesion is 50% stenosed  · 1st Diag lesion is 90% stenosed  Review of Systems:  Review of Systems   Constitutional: Negative  HENT: Negative  Eyes: Negative  Respiratory: Negative  Cardiovascular: Negative  Endocrine: Negative  Musculoskeletal: Negative  Physical Exam:  /72   Pulse (!) 111   Ht 5' 4" (1 626 m)   Wt 74 8 kg (165 lb)   BMI 28 32 kg/m²   Physical Exam  Constitutional:       General: He is not in acute distress  Appearance: Normal appearance   He is not ill-appearing  HENT:      Nose: No congestion or rhinorrhea  Mouth/Throat:      Mouth: Mucous membranes are moist       Pharynx: Oropharynx is clear  No oropharyngeal exudate  Eyes:      General:         Right eye: No discharge  Conjunctiva/sclera: Conjunctivae normal       Pupils: Pupils are equal, round, and reactive to light  Cardiovascular:      Rate and Rhythm: Normal rate  Pulses: Normal pulses  Heart sounds: No murmur heard  No friction rub  Pulmonary:      Effort: Pulmonary effort is normal  No respiratory distress  Breath sounds: No stridor  No wheezing or rhonchi  Abdominal:      General: Abdomen is flat  Bowel sounds are normal  There is no distension  Palpations: There is no mass  Hernia: No hernia is present  Musculoskeletal:         General: No swelling, tenderness, deformity or signs of injury  Normal range of motion  Cervical back: Normal range of motion  Skin:     General: Skin is warm  Coloration: Skin is not jaundiced or pale  Findings: No bruising or erythema  Neurological:      Mental Status: He is alert  This note was completed in part utilizing [employer] direct voice recognition software  Grammatical errors, random word insertion, spelling mistakes, occasional wrong word or "sound-alike" substitutions and incomplete sentences may be an occasional consequence of the system secondary to software limitations, ambient noise and hardware issues  At the time of dictation, efforts were made to edit, clarify and /or correct errors  Please read the chart carefully and recognize, using context, where substitutions have occurred  If you have any questions or concerns about the context, text or information contained within the body of this dictation, please contact myself, the provider, for further clarification

## 2023-03-29 ENCOUNTER — APPOINTMENT (OUTPATIENT)
Dept: LAB | Facility: CLINIC | Age: 73
End: 2023-03-29

## 2023-03-29 DIAGNOSIS — E78.5 DYSLIPIDEMIA, GOAL LDL BELOW 70: ICD-10-CM

## 2023-03-29 DIAGNOSIS — I25.10 CORONARY ARTERY DISEASE INVOLVING NATIVE CORONARY ARTERY OF NATIVE HEART WITHOUT ANGINA PECTORIS: ICD-10-CM

## 2023-03-29 DIAGNOSIS — I10 HYPERTENSION, UNSPECIFIED TYPE: ICD-10-CM

## 2023-03-29 DIAGNOSIS — I42.8 OTHER CARDIOMYOPATHY (HCC): ICD-10-CM

## 2023-03-29 LAB
ANION GAP SERPL CALCULATED.3IONS-SCNC: 4 MMOL/L (ref 4–13)
BUN SERPL-MCNC: 14 MG/DL (ref 5–25)
CALCIUM SERPL-MCNC: 9 MG/DL (ref 8.3–10.1)
CHLORIDE SERPL-SCNC: 111 MMOL/L (ref 96–108)
CHOLEST SERPL-MCNC: 205 MG/DL
CO2 SERPL-SCNC: 22 MMOL/L (ref 21–32)
CREAT SERPL-MCNC: 0.67 MG/DL (ref 0.6–1.3)
GFR SERPL CREATININE-BSD FRML MDRD: 96 ML/MIN/1.73SQ M
GLUCOSE P FAST SERPL-MCNC: 165 MG/DL (ref 65–99)
HDLC SERPL-MCNC: 33 MG/DL
LDLC SERPL CALC-MCNC: 149 MG/DL (ref 0–100)
POTASSIUM SERPL-SCNC: 3.8 MMOL/L (ref 3.5–5.3)
SODIUM SERPL-SCNC: 137 MMOL/L (ref 135–147)
TRIGL SERPL-MCNC: 113 MG/DL

## 2023-03-31 DIAGNOSIS — I25.10 CORONARY ARTERY DISEASE INVOLVING NATIVE CORONARY ARTERY OF NATIVE HEART WITHOUT ANGINA PECTORIS: ICD-10-CM

## 2023-03-31 DIAGNOSIS — E78.5 DYSLIPIDEMIA, GOAL LDL BELOW 70: ICD-10-CM

## 2023-03-31 DIAGNOSIS — I73.9 PAD (PERIPHERAL ARTERY DISEASE) (HCC): ICD-10-CM

## 2023-03-31 DIAGNOSIS — I25.10 CORONARY ARTERY DISEASE INVOLVING NATIVE CORONARY ARTERY OF NATIVE HEART WITHOUT ANGINA PECTORIS: Primary | ICD-10-CM

## 2023-03-31 RX ORDER — EZETIMIBE 10 MG/1
10 TABLET ORAL DAILY
Qty: 90 TABLET | Refills: 3 | Status: SHIPPED | OUTPATIENT
Start: 2023-03-31 | End: 2023-03-31

## 2023-03-31 RX ORDER — EZETIMIBE 10 MG/1
10 TABLET ORAL DAILY
Qty: 30 TABLET | Refills: 6 | Status: SHIPPED | OUTPATIENT
Start: 2023-03-31

## 2023-04-07 ENCOUNTER — OFFICE VISIT (OUTPATIENT)
Dept: CARDIOLOGY CLINIC | Facility: HOSPITAL | Age: 73
End: 2023-04-07

## 2023-04-07 ENCOUNTER — HOSPITAL ENCOUNTER (OUTPATIENT)
Dept: NON INVASIVE DIAGNOSTICS | Facility: HOSPITAL | Age: 73
Discharge: HOME | End: 2023-04-07
Attending: INTERNAL MEDICINE

## 2023-04-07 VITALS
SYSTOLIC BLOOD PRESSURE: 130 MMHG | HEIGHT: 64 IN | HEART RATE: 98 BPM | BODY MASS INDEX: 28.31 KG/M2 | DIASTOLIC BLOOD PRESSURE: 70 MMHG

## 2023-04-07 VITALS
HEIGHT: 64 IN | HEART RATE: 102 BPM | SYSTOLIC BLOOD PRESSURE: 140 MMHG | DIASTOLIC BLOOD PRESSURE: 72 MMHG | BODY MASS INDEX: 28.15 KG/M2 | WEIGHT: 164.9 LBS

## 2023-04-07 DIAGNOSIS — I50.23 ACUTE ON CHRONIC SYSTOLIC CONGESTIVE HEART FAILURE (HCC): Primary | ICD-10-CM

## 2023-04-07 DIAGNOSIS — E78.5 DYSLIPIDEMIA, GOAL LDL BELOW 70: ICD-10-CM

## 2023-04-07 DIAGNOSIS — I73.9 PAD (PERIPHERAL ARTERY DISEASE) (HCC): ICD-10-CM

## 2023-04-07 DIAGNOSIS — I42.8 OTHER CARDIOMYOPATHY (HCC): ICD-10-CM

## 2023-04-07 DIAGNOSIS — I25.10 CORONARY ARTERY DISEASE INVOLVING NATIVE CORONARY ARTERY OF NATIVE HEART WITHOUT ANGINA PECTORIS: ICD-10-CM

## 2023-04-07 DIAGNOSIS — I20.9 ANGINA PECTORIS, UNSPECIFIED (HCC): ICD-10-CM

## 2023-04-07 DIAGNOSIS — I10 HYPERTENSION, UNSPECIFIED TYPE: ICD-10-CM

## 2023-04-07 LAB
AORTIC ROOT: 3.3 CM
APICAL FOUR CHAMBER EJECTION FRACTION: 37 %
ASCENDING AORTA: 3.4 CM
FRACTIONAL SHORTENING: 17 % (ref 28–44)
INTERVENTRICULAR SEPTUM IN DIASTOLE (PARASTERNAL SHORT AXIS VIEW): 1 CM
INTERVENTRICULAR SEPTUM: 1 CM (ref 0.6–1.1)
LAAS-AP2: 25.7 CM2
LAAS-AP4: 17.6 CM2
LEFT ATRIUM SIZE: 4.9 CM
LEFT INTERNAL DIMENSION IN SYSTOLE: 4.8 CM (ref 2.1–4)
LEFT VENTRICULAR INTERNAL DIMENSION IN DIASTOLE: 5.8 CM (ref 3.5–6)
LEFT VENTRICULAR POSTERIOR WALL IN END DIASTOLE: 1 CM
LEFT VENTRICULAR STROKE VOLUME: 54 ML
LVSV (TEICH): 54 ML
MV E'TISSUE VEL-LAT: 12 CM/S
MV E'TISSUE VEL-SEP: 15 CM/S
RIGHT ATRIUM AREA SYSTOLE A4C: 16.2 CM2
RIGHT VENTRICLE ID DIMENSION: 2.5 CM
SL CV LEFT ATRIUM LENGTH A2C: 6.6 CM
SL CV LV EF: 35
SL CV PED ECHO LEFT VENTRICLE DIASTOLIC VOLUME (MOD BIPLANE) 2D: 164 ML
SL CV PED ECHO LEFT VENTRICLE SYSTOLIC VOLUME (MOD BIPLANE) 2D: 110 ML
TRICUSPID ANNULAR PLANE SYSTOLIC EXCURSION: 3.4 CM

## 2023-04-07 RX ORDER — LOSARTAN POTASSIUM 50 MG/1
50 TABLET ORAL DAILY
Qty: 90 TABLET | Refills: 3 | Status: SHIPPED | OUTPATIENT
Start: 2023-04-07

## 2023-04-07 NOTE — PROGRESS NOTES
Cardiology Follow Up    Tonja Bloch  1950  [id number]  [address]  [address] [place] [address]  [place] [address] [place] [address] [place] [address]  [phone]    1  Acute on chronic systolic congestive heart failure ([facility] [address] )        2  PAD (peripheral artery disease) ([facility] )        3  Coronary artery disease involving native coronary artery of native heart without angina pectoris  POCT ECG    Basic metabolic panel      4  Hypertension, unspecified type  Basic metabolic panel      5  Angina pectoris, unspecified ([facility] )        6  Dyslipidemia, goal LDL below 70  Lipid Panel with Direct LDL reflex      7  Other cardiomyopathy ([facility] )  losartan (COZAAR) 50 mg tablet          Diagnoses and all orders for this visit:    Acute on chronic systolic congestive heart failure (HCC)    PAD (peripheral artery disease) ([facility] )    Coronary artery disease involving native coronary artery of native heart without angina pectoris  -     POCT ECG  -     Basic metabolic panel    Hypertension, unspecified type  -     Basic metabolic panel    Angina pectoris, unspecified ([facility] )    Dyslipidemia, goal LDL below 70  -     Lipid Panel with Direct LDL reflex; Future    Other cardiomyopathy (HCC)  -     losartan (COZAAR) 50 mg tablet; Take 1 tablet (50 mg total) by mouth daily      I had the pleasure of seeing Tonja Bloch for a follow up visit  INTERVAL HISTORY: none    History of the presenting illness, Discussion/Summary and My Plan are as follows:::    He is a 81-year-old gentleman with hypertension, dyslipidemia, paraplegia from a prior accident, no prior drug or alcohol use, remote tobacco use, no malignancies or chemotherapy, no palpitations, family history of premature vascular disease in both parents, Jez in January 2022 at which time he was diagnosed with a cardiomyopathy-EF 40% and discharged home      He was admitted with heart failure and transferred to [facility] Laurita Cruz very underwent coronary angiography that showed triple-vessel coronary artery disease, turned down for CABG, underwent PCI of the LAD system in February 2022 and staged PCI of the circumflex system in March 2022  Life vest was offered, deferred in the hospital   Echocardiogram that time showed an ejection fraction of 20%  At his last visit, despite need to further increase his loop diuretic (with SOB and edema), due to the need for self-catheterization-(on non Lasix days, he has to self-catheterize himself six to 7 times a day but on Lasix days, up to 15 times a day), advised to try to take it 40 mg daily at least   At the last visit he was taking about 40 mg every other day, about 3 times a week  Currently about once a week  He does admit to dyspnea with moderate exertion such as moving around in his wheelchair, getting up, getting dressed  Clint Dieter He does have significant right lower extremity edema and feels short of breath especially on the morning of Lasix days but feels great by the end of the day  Does admit to increasing shortness of breath with activity as well  He does have severe PVD and underwent a left above-knee amputation and also had right lower extremity fourth toe would that has healed, and now also has a buttock/sacral ulcer  Since his last visit, he underwent a left above knee amputation(Alfred) for a nonhealing wound with severe peripheral vascular disease, preferred primary amputation as a post revascularization with his chronic/frequent ulcerations, he still has a right lower extremity 4th toe nonhealing wound for which he is seeing wound care at [facility]      Echocardiogram-October 2022, ejection fraction of 40%, global hypokinesis, no significant valvular disease  July 2022-EF 35%   February 2022-EF-20%    Plan:    Cardiomyopathy and chronic systolic and diastolic congestive heart failure: Currently on Lasix 40 mg about once a week-we will continue the same dose- could try increasing it but due to the inconvenience it causes- reasonable to keep it at the same dose  Discussed the potential for a suprapubic catheter but he likes his independence and would like to hold off  He does have the option of seeing urology for another opinion  Right fourth toe wound has healed, now has a right buttock pressure ulcer   Lungs are clear to auscultation and no orthopnea  Blood pressures have improved,  Last visit, restarted losartan 25 mg daily, he took it for 3 months and did not since  EF still around 35-40%  Increase Losartan to 50 mg daily in about a week or so  BMP in about 1 week  Kourtney Late is also an option but because of its diuretic effect, he would like to hold off  Averse to aggressive GDMT except increasing Losartan, feels resigned to him being 81 with comorbidities    Coronary artery disease:  Staged PCI of LAD and circumflex systems-February-March 2022  Continue dual antiplatelet therapy  No chest pains or anginal symptoms  Hypertension: , now controlled      Dyslipidemia: Remains on atorvastatin, virtually unchanged despite now being compliant on statin, originally was not, he does admit to poor diet and will make no changes  Paraplegia:  Chronic, self catheterizes about 5 to 6 times a day on non Lasix days and up to 15 times a day on Lasix days     Still fairly functional but feels short of breath with increased activity  Advised him to get a PCP also again  Follow-up in 6 months      Cardiomyopathy-coronary angiography - -ultimately leading to LAD PCI and circumflex PCI  Left Main   The vessel is moderate in size  There is mild diffuse disease throughout the vessel  Left Anterior Descending   The vessel is moderate in size  There is moderate diffuse disease throughout the vessel  Prox LAD lesion is 90% stenosed  AMBER flow is 2  The lesion is type C and tubular  Mid LAD lesion is 70% stenosed     First Diagonal Branch   1st Diag lesion is 99% stenosed  The lesion is tubular  Ramus Intermedius   Ramus lesion is 100% stenosed  Left Circumflex   The vessel is moderate in size  There is moderate diffuse disease throughout the vessel  Prox Cx lesion is 90% stenosed  Right Coronary Artery   The vessel is moderate in size and dominant  Collaterals   Prox RCA filled by collaterals from Prox Cx  The collateral flow was limited  Ost RCA to Prox RCA lesion is 100% stenosed  The lesion is chronically occluded  Third Right Posterolateral Branch   Collaterals   3rd RPL filled by collaterals from 3rd Mrg  The collateral flow was moderate  Latest Reference Range & Units 07/06/22 10:58   Albumin 3 5 - 5 0 g/dL 3 9       Normal K, BUN, Cr in Sept 2022  Results for Corrinne Drop (MRN [id number]) as of 4/7/2022 14:30   Ref  Range 3/3/2022 14:05 3/21/2022 14:25   BUN Latest Ref Range: 5 - 25 mg/dL 20 19   Creatinine Latest Ref Range: 0 60 - 1 30 mg/dL 0 75 0 64     Results for Corrinne Drop (MRN [id number]) as of 4/7/2022 14:30   Ref  Range 1/6/2022 04:32 2/16/2022 06:09   Cholesterol Latest Ref Range: See Comment mg/dL 184 190   Triglycerides Latest Ref Range: See Comment mg/dL 64 133   HDL Latest Ref Range: >=40 mg/dL 30 (L) 28 (L)   Non-HDL Cholesterol Latest Units: mg/dl 154    LDL Calculated Latest Ref Range: 0 - 100 mg/dL 141 (H) 135 (H)   Results for Corrinne Drop (MRN [id number]) as of 4/7/2022 14:30   Ref   Range 1/6/2022 04:32   Hemoglobin A1C  6 5 (H)       Patient Active Problem List   Diagnosis   • Paraplegia (HCC)   • Skin ulcer of toe of left foot with fat layer exposed ([address] )   • Skin ulcer of toe of right foot with fat layer exposed ([address] )   • PAD (peripheral artery disease) ([address] )   • Acute respiratory failure with hypoxia (HCC)   • Elevated troponin   • Neurogenic bladder   • Other constipation   • Transaminitis   • Elevated CPK   • Acute on chronic systolic congestive heart failure (HCC)   • Acute on chronic respiratory failure with hypoxia ([facility] )   • Hemoptysis   • Type 2 diabetes mellitus (HCC)   • Iron deficiency anemia   • Coronary artery disease involving native coronary artery of native heart without angina pectoris   • Myocardiopathy (HCC)   • Angina pectoris, unspecified (HCC)   • Dyslipidemia, goal LDL below 70   • Hypertension     Past Medical History:   Diagnosis Date   • CHF (congestive heart failure) (HCC)    • COVID    • Paraplegia (HCC)     x30 years     Social History     Socioeconomic History   • Marital status: /Civil Union     Spouse name: Not on file   • Number of children: Not on file   • Years of education: Not on file   • Highest education level: Not on file   Occupational History   • Not on file   Tobacco Use   • Smoking status: Never   • Smokeless tobacco: Never   Vaping Use   • Vaping Use: Never used   Substance and Sexual Activity   • Alcohol use: Never     Alcohol/week: 0 0 standard drinks   • Drug use: Never   • Sexual activity: Not on file   Other Topics Concern   • Not on file   Social History Narrative   • Not on file     Social Determinants of Health     Financial Resource Strain: Not on file   Food Insecurity: Not on file   Transportation Needs: Not on file   Physical Activity: Not on file   Stress: Not on file   Social Connections: Not on file   Intimate Partner Violence: Not on file   Housing Stability: Not on file      Family History   Problem Relation Age of Onset   • Cancer Mother    • Stroke Mother    • Cancer Father    • Alcohol abuse Father      Past Surgical History:   Procedure Laterality Date   • BACK SURGERY     • CARDIAC CATHETERIZATION N/A 2/16/2022    Procedure: CARDIAC CORONARY ANGIOGRAM;  Surgeon: Tori Birch MD;  Location: [facility]; Service: Cardiology   • CARDIAC CATHETERIZATION Left 2/16/2022    Procedure: Cardiac Left Heart Cath;  Surgeon: Tori Birch MD;  [facility]: [facility];   Service: Cardiology   • CARDIAC CATHETERIZATION N/A 2/18/2022    Procedure: Cardiac pci;  Surgeon: Lauren Small MD;  [facility]; Service: Cardiology   • CARDIAC CATHETERIZATION N/A 3/28/2022    Procedure: CARDIAC PCI;  Surgeon: Lauren Small MD;  [facility]: [facility]; Service: Cardiology   • CARDIAC CATHETERIZATION N/A 3/28/2022    Procedure: Cardiac Coronary Angiogram;  Surgeon: Lauren Small MD;  [facility]; Service: Cardiology       Current Outpatient Medications:   •  aspirin (ECOTRIN LOW STRENGTH) 81 mg EC tablet, Take 1 tablet (81 mg total) by mouth daily, Disp: 90 tablet, Rfl: 3  •  atorvastatin (LIPITOR) 40 mg tablet, Take 1 tablet (40 mg total) by mouth daily with dinner, Disp: 90 tablet, Rfl: 3  •  clopidogrel (PLAVIX) 75 mg tablet, Take 1 tablet (75 mg total) by mouth daily, Disp: 90 tablet, Rfl: 3  •  cyanocobalamin (VITAMIN B-12) 100 MCG tablet, Take 100 mcg by mouth daily, Disp: , Rfl:   •  ezetimibe (ZETIA) 10 mg tablet, Take 1 tablet (10 mg total) by mouth daily, Disp: 30 tablet, Rfl: 6  •  furosemide (LASIX) 40 mg tablet, Take 1 tablet (40 mg total) by mouth daily (Patient taking differently: Take 40 mg by mouth every other day), Disp: 90 tablet, Rfl: 3  •  losartan (COZAAR) 50 mg tablet, Take 1 tablet (50 mg total) by mouth daily, Disp: 90 tablet, Rfl: 3  •  metoprolol succinate (TOPROL-XL) 50 mg 24 hr tablet, Take 1 tablet (50 mg total) by mouth 2 (two) times a day, Disp: 180 tablet, Rfl: 3  •  nitroglycerin (NITROSTAT) 0 4 mg SL tablet, Place 1 tablet (0 4 mg total) under the tongue every 5 (five) minutes as needed for chest pain, Disp: 9 tablet, Rfl: 0  •  albuterol (PROVENTIL HFA,VENTOLIN HFA) 90 mcg/act inhaler, Inhale 2 puffs every 4 (four) hours as needed for wheezing (Patient not taking: Reported on 3/3/2022), Disp: 18 g, Rfl: 0  No Known Allergies    Imaging: Cardiac catheterization    Result Date: 3/28/2022  Narrative: · Prox Cx lesion is 90% stenosed  · 2nd Mrg lesion is 50% stenosed   · 1st Diag lesion is 90% "stenosed  Review of Systems:  Review of Systems   Constitutional: Negative  HENT: Negative  Eyes: Negative  Respiratory: Negative  Cardiovascular: Negative  Endocrine: Negative  Musculoskeletal: Negative  Physical Exam:  /70   Pulse 98   Ht 5' 4\" (1 626 m)   BMI 28 31 kg/m²   Physical Exam  Constitutional:       General: He is not in acute distress  Appearance: Normal appearance  He is not ill-appearing  HENT:      Nose: No congestion or rhinorrhea  Mouth/Throat:      Mouth: Mucous membranes are moist       Pharynx: Oropharynx is clear  No oropharyngeal exudate  Eyes:      General:         Right eye: No discharge  Conjunctiva/sclera: Conjunctivae normal       Pupils: Pupils are equal, round, and reactive to light  Cardiovascular:      Rate and Rhythm: Normal rate  Pulses: Normal pulses  Heart sounds: No murmur heard  No friction rub  Pulmonary:      Effort: Pulmonary effort is normal  No respiratory distress  Breath sounds: No stridor  No wheezing or rhonchi  Abdominal:      General: Abdomen is flat  Bowel sounds are normal  There is no distension  Palpations: There is no mass  Hernia: No hernia is present  Musculoskeletal:         General: No swelling, tenderness, deformity or signs of injury  Normal range of motion  Cervical back: Normal range of motion  Right lower leg: Edema (mild) present  Comments: Left AKA   Skin:     General: Skin is warm  Coloration: Skin is not jaundiced or pale  Findings: No bruising or erythema  Neurological:      Mental Status: He is alert  This note was completed in part utilizing [employer] direct voice recognition software     Grammatical errors, random word insertion, spelling mistakes, occasional wrong word or \"sound-alike\" substitutions and incomplete sentences may be an occasional consequence of the system secondary to software limitations, ambient " noise and hardware issues  At the time of dictation, efforts were made to edit, clarify and /or correct errors  Please read the chart carefully and recognize, using context, where substitutions have occurred  If you have any questions or concerns about the context, text or information contained within the body of this dictation, please contact myself, the provider, for further clarification

## 2023-09-02 ENCOUNTER — HOSPITAL ENCOUNTER (EMERGENCY)
Facility: HOSPITAL | Age: 73
Discharge: LEFT BEFORE DISCHARGE | End: 2023-09-02
Attending: EMERGENCY MEDICINE | Admitting: EMERGENCY MEDICINE
Payer: MEDICARE

## 2023-09-02 VITALS
HEIGHT: 64 IN | RESPIRATION RATE: 18 BRPM | BODY MASS INDEX: 28 KG/M2 | OXYGEN SATURATION: 97 % | WEIGHT: 164 LBS | SYSTOLIC BLOOD PRESSURE: 143 MMHG | DIASTOLIC BLOOD PRESSURE: 74 MMHG | TEMPERATURE: 100.9 F | HEART RATE: 116 BPM

## 2023-09-02 DIAGNOSIS — Z51.89 VISIT FOR WOUND CHECK: Primary | ICD-10-CM

## 2023-09-02 PROCEDURE — 99284 EMERGENCY DEPT VISIT MOD MDM: CPT | Performed by: EMERGENCY MEDICINE

## 2023-09-02 PROCEDURE — 99283 EMERGENCY DEPT VISIT LOW MDM: CPT

## 2023-09-03 NOTE — ED PROVIDER NOTES
History  Chief Complaint   Patient presents with   • Wound Check     Pt reports wound to buttocks for the past 6-7 months. States the wound is getting bigger, tunneling, and has an odor. Last seen at wound care two weeks ago     72-year-old male presents for wound check. He is paraplegic is a history of peripheral arterial disease neurogenic bladder CHF coronary artery disease myocardiopathy and a right buttock pressure wound. He is followed by the wound clinic in [facility]. He states that he has had this wound for 6 to 7 months there is been an older from it he was last seen at the clinic a couple of weeks ago he is having drainage which is fou smelling  he presents to the ER just to have the dead tissue cut off of it he states when he puts his finger there he can feel his bone. He denies any fever or chills he had no nausea or vomiting. Patient denies any suicidal or homicidal ideation. Prior to Admission Medications   Prescriptions Last Dose Informant Patient Reported? Taking?    albuterol (PROVENTIL HFA,VENTOLIN HFA) 90 mcg/act inhaler   No No   Sig: Inhale 2 puffs every 4 (four) hours as needed for wheezing   Patient not taking: Reported on 3/3/2022   aspirin (ECOTRIN LOW STRENGTH) 81 mg EC tablet   No No   Sig: Take 1 tablet (81 mg total) by mouth daily   atorvastatin (LIPITOR) 40 mg tablet   No No   Sig: Take 1 tablet (40 mg total) by mouth daily with dinner   clopidogrel (PLAVIX) 75 mg tablet   No No   Sig: Take 1 tablet (75 mg total) by mouth daily   cyanocobalamin (VITAMIN B-12) 100 MCG tablet  Self Yes No   Sig: Take 100 mcg by mouth daily   ezetimibe (ZETIA) 10 mg tablet   No No   Sig: Take 1 tablet (10 mg total) by mouth daily   furosemide (LASIX) 40 mg tablet  Self No No   Sig: Take 1 tablet (40 mg total) by mouth daily   Patient taking differently: Take 40 mg by mouth every other day   losartan (COZAAR) 50 mg tablet   No No   Sig: Take 1 tablet (50 mg total) by mouth daily   metoprolol succinate (TOPROL-XL) 50 mg 24 hr tablet   No No   Sig: Take 1 tablet (50 mg total) by mouth 2 (two) times a day   nitroglycerin (NITROSTAT) 0.4 mg SL tablet  Self No No   Sig: Place 1 tablet (0.4 mg total) under the tongue every 5 (five) minutes as needed for chest pain      Facility-Administered Medications: None       Past Medical History:   Diagnosis Date   • CHF (congestive heart failure) ([facility])    • COVID    • Paraplegia ([facility])     x30 years       Past Surgical History:   Procedure Laterality Date   • BACK SURGERY     • CARDIAC CATHETERIZATION N/A 2/16/2022    Procedure: CARDIAC CORONARY ANGIOGRAM;  Surgeon: Tato Angel MD;  Location: [facility]; Service: Cardiology   • CARDIAC CATHETERIZATION Left 2/16/2022    Procedure: Cardiac Left Heart Cath;  Surgeon: Tato Angel MD;  [facility]; Service: Cardiology   • CARDIAC CATHETERIZATION N/A 2/18/2022    Procedure: Cardiac pci;  Surgeon: Tato Angel MD;  [facility]; Service: Cardiology   • CARDIAC CATHETERIZATION N/A 3/28/2022    Procedure: CARDIAC PCI;  Surgeon: Tato Angel MD;  [facility]; Service: Cardiology   • CARDIAC CATHETERIZATION N/A 3/28/2022    Procedure: Cardiac Coronary Angiogram;  Surgeon: Tato Angel MD;  [facility]; Service: Cardiology       Family History   Problem Relation Age of Onset   • Cancer Mother    • Stroke Mother    • Cancer Father    • Alcohol abuse Father      I have reviewed and agree with the history as documented.     E-Cigarette/Vaping   • E-Cigarette Use Never User      E-Cigarette/Vaping Substances     Social History     Tobacco Use   • Smoking status: Never   • Smokeless tobacco: Never   Vaping Use   • Vaping Use: Never used   Substance Use Topics   • Alcohol use: Never     Alcohol/week: 0.0 standard drinks of alcohol   • Drug use: Never       Review of Systems   Constitutional: Negative for activity change, appetite change, chills and fever. Skin: Positive for wound. Physical Exam  Physical Exam  Vitals and nursing note reviewed. Constitutional:       General: He is not in acute distress. Appearance: He is not ill-appearing, toxic-appearing or diaphoretic. Comments: Refuses further examination   Neurological:      Mental Status: He is alert. Vital Signs  ED Triage Vitals [09/02/23 2331]   Temperature Pulse Respirations Blood Pressure SpO2   (!) 100.9 °F (38.3 °C) (!) 116 18 143/74 97 %      Temp Source Heart Rate Source Patient Position - Orthostatic VS BP Location FiO2 (%)   Tympanic Monitor Sitting Left arm --      Pain Score       No Pain           Vitals:    09/02/23 2331   BP: 143/74   Pulse: (!) 116   Patient Position - Orthostatic VS: Sitting         Visual Acuity      ED Medications  Medications - No data to display    Diagnostic Studies  Results Reviewed     None                 No orders to display              Procedures  Procedures         ED Course                               SBIRT 20yo+    Flowsheet Row Most Recent Value   Initial Alcohol Screen: US AUDIT-C     1. How often do you have a drink containing alcohol? 0 Filed at: 09/02/2023 2331   2. How many drinks containing alcohol do you have on a typical day you are drinking? 0 Filed at: 09/02/2023 2331   3a. Male UNDER 65: How often do you have five or more drinks on one occasion? 0 Filed at: 09/02/2023 2331   3b. FEMALE Any Age, or MALE 65+: How often do you have 4 or more drinks on one occassion? 0 Filed at: 09/02/2023 2331   Audit-C Score 0 Filed at: 09/02/2023 2331   JOSE RAFAEL: How many times in the past year have you. .. Used an illegal drug or used a prescription medication for non-medical reasons? Never Filed at: 09/02/2023 2331                    Medical Decision Making  Mdm: States he has been at this a long time. He is tired he denies any suicidal or homicidal ideation.   He came to the emergency department today specifically to have the dead tissue cut out of his wound which is his right buttock pressure injury I did review his wound care note from [facility] on 8/14/2023 he initially had a pressure injury on in 12/2022 he is currently undergoing dressing changes patient states he has an MRI scheduled next week per note he also had a wound to the right first toe tip which is essentially healed up. I reviewed patient's vital signs with him he is n have a fever he is demonstrating tachycardia there is no hypotension he is demonstrating signs of SIRS. I reviewed with him that in the emergency department if he is able to palpate the bone the wound is more than what I would be able to manage and he would require referral to surgery which would be possible through this emergency department his evaluation would consist of IV antibiotics and evaluation of his lab work IV fluids a CAT scan to see the full extent of the wound and a discussion with surgery patient was not interested in this evaluation he merely wanted the "dead tissue cut out" I reviewed them that based on what he is describing he requires a more complete evaluation he is at risk for sepsis and death patient verbalized understanding that he could die from this  and politely refused further evaluation and is declining a medical screening exam.  He simply wishes to go home. Reviewed with him he can return at any time recommend he follow-up with his wound care physician. Patient has medical decision-making capacity he is alert and oriented x4.         Disposition  Final diagnoses:   Visit for wound check - refusal of medical screening exam     Time reflects when diagnosis was documented in both MDM as applicable and the Disposition within this note     Time User Action Codes Description Comment    9/2/2023 11:52 PM Consuelo Davis Add [Z51.89] Visit for wound check     9/3/2023 12:04 AM Consuelo Davis Modify [Z51.89] Visit for wound check refusal of medical screening exam      ED Disposition     ED Disposition   AMA    Condition   --    Date/Time   Sat Sep 2, 2023 11:55 PM    Comment   Date: 9/2/2023  Patient: Nallely Conroy  Admitted: 9/2/2023 11:26 PM  Attending Provider: Rena Jurado or his authorized caregiver has made the decision for the patient to leave the emergency department against the a dvice of his attending physician. He or his authorized caregiver has been informed and understands the inherent risks, including death,. He or his authorized caregiver has decided to accept the responsibility for this decision. Nallely Minors and  all necessary parties have been advised that he may return for further evaluation or treatment. His condition at time of discharge was guarded.   Nallely Johnsons had current vital signs as follows:  /74 (BP Location: Left arm)   Pulse (!) 116    Temp (!) 100.9 °F (38.3 °C) (Tympanic)   Resp 18   Ht 5' 4" (1.626 m)   Wt 74.4 kg (164 lb)            Follow-up Information    None         Discharge Medication List as of 9/2/2023 11:57 PM      CONTINUE these medications which have NOT CHANGED    Details   albuterol (PROVENTIL HFA,VENTOLIN HFA) 90 mcg/act inhaler Inhale 2 puffs every 4 (four) hours as needed for wheezing, Starting Sun 2/20/2022, Normal      aspirin (ECOTRIN LOW STRENGTH) 81 mg EC tablet Take 1 tablet (81 mg total) by mouth daily, Starting Thu 3/3/2022, Normal      atorvastatin (LIPITOR) 40 mg tablet Take 1 tablet (40 mg total) by mouth daily with dinner, Starting Thu 3/3/2022, Normal      clopidogrel (PLAVIX) 75 mg tablet Take 1 tablet (75 mg total) by mouth daily, Starting Thu 3/3/2022, Normal      cyanocobalamin (VITAMIN B-12) 100 MCG tablet Take 100 mcg by mouth daily, Historical Med      ezetimibe (ZETIA) 10 mg tablet Take 1 tablet (10 mg total) by mouth daily, Starting Fri 3/31/2023, Normal      furosemide (LASIX) 40 mg tablet Take 1 tablet (40 mg total) by mouth daily, Starting Thu 3/3/2022, Normal      losartan (COZAAR) 50 mg tablet Take 1 tablet (50 mg total) by mouth daily, Starting Fri 4/7/2023, Normal      metoprolol succinate (TOPROL-XL) 50 mg 24 hr tablet Take 1 tablet (50 mg total) by mouth 2 (two) times a day, Starting Thu 3/3/2022, Normal      nitroglycerin (NITROSTAT) 0.4 mg SL tablet Place 1 tablet (0.4 mg total) under the tongue every 5 (five) minutes as needed for chest pain, Starting Wed 1/12/2022, Normal             No discharge procedures on file.     PDMP Review     None          ED Provider  Electronically Signed by           Surinder Christiansen MD  09/03/23 0005

## 2023-09-03 NOTE — ED NOTES
Provider at bedside to evaluate patient. Pt refused blood work. Pt then refused a provider evaluation after explained that there is no surgeon in the ED that will debrief his wound. He was offered CT as well but also refused. Pt wishes to go home at this time prior to evaluation despite being explained by provider that he is already tachy and has a fever. Pt was assisted back into his wheelchair.        Bernard Hartmann RN  09/02/23 [id number]

## 2023-12-05 NOTE — RESPIRATORY THERAPY NOTE
01/10/22 0038   Respiratory Assessment   Resp Comments Patient laying on his back on 10L midflow, water is full  Patient had oxygen saturations around 88%  I asked patient if he could lay on his side, he first told me no, then he asked why I would ask him to do this, I explained the Covid pneumonia and how proning helps  Tonight he has told me he could lay on both sides  He did get onto his right side and his oxygenation did go up   He does have the incentive spirometer at bedside he states he is using   Oxygen Therapy/Pulse Ox   O2 Device Mid flow nasal cannula   O2 Therapy Oxygen humidified   Nasal Cannula O2 Flow Rate (L/min) 10 L/min   Calculated FIO2 (%) - Nasal Cannula 60   SpO2 Activity At Rest   $ Pulse Oximetry Spot Check Charge Completed Medicare Preventive Visit Patient Instructions  Thank you for completing your Welcome to Medicare Visit or Medicare Annual Wellness Visit today. Your next wellness visit will be due in one year (12/5/2024). The screening/preventive services that you may require over the next 5-10 years are detailed below. Some tests may not apply to you based off risk factors and/or age. Screening tests ordered at today's visit but not completed yet may show as past due. Also, please note that scanned in results may not display below. Preventive Screenings:  Service Recommendations Previous Testing/Comments   Colorectal Cancer Screening  * Colonoscopy    * Fecal Occult Blood Test (FOBT)/Fecal Immunochemical Test (FIT)  * Fecal DNA/Cologuard Test  * Flexible Sigmoidoscopy Age: 43-73 years old   Colonoscopy: every 10 years (may be performed more frequently if at higher risk)  OR  FOBT/FIT: every 1 year  OR  Cologuard: every 3 years  OR  Sigmoidoscopy: every 5 years  Screening may be recommended earlier than age 39 if at higher risk for colorectal cancer. Also, an individualized decision between you and your healthcare provider will decide whether screening between the ages of 77-80 would be appropriate. Colonoscopy: 01/20/2022  FOBT/FIT: Not on file  Cologuard: Not on file  Sigmoidoscopy: Not on file          Breast Cancer Screening Age: 36 years old  Frequency: every 1-2 years  Not required if history of left and right mastectomy Mammogram: 05/30/2023        Cervical Cancer Screening Between the ages of 21-29, pap smear recommended once every 3 years. Between the ages of 32-69, can perform pap smear with HPV co-testing every 5 years.    Recommendations may differ for women with a history of total hysterectomy, cervical cancer, or abnormal pap smears in past. Pap Smear: 08/21/2019        Hepatitis C Screening Once for adults born between 1945 and 1965  More frequently in patients at high risk for Hepatitis C Hep C Antibody: 11/07/2018        Diabetes Screening 1-2 times per year if you're at risk for diabetes or have pre-diabetes Fasting glucose: 100 mg/dL (9/13/2023)  A1C: 5.6 % (4/2/2019)      Cholesterol Screening Once every 5 years if you don't have a lipid disorder. May order more often based on risk factors. Lipid panel: 12/02/2022          Other Preventive Screenings Covered by Medicare:  Abdominal Aortic Aneurysm (AAA) Screening: covered once if your at risk. You're considered to be at risk if you have a family history of AAA. Lung Cancer Screening: covers low dose CT scan once per year if you meet all of the following conditions: (1) Age 48-67; (2) No signs or symptoms of lung cancer; (3) Current smoker or have quit smoking within the last 15 years; (4) You have a tobacco smoking history of at least 20 pack years (packs per day multiplied by number of years you smoked); (5) You get a written order from a healthcare provider. Glaucoma Screening: covered annually if you're considered high risk: (1) You have diabetes OR (2) Family history of glaucoma OR (3)  aged 48 and older OR (3)  American aged 72 and older  Osteoporosis Screening: covered every 2 years if you meet one of the following conditions: (1) You're estrogen deficient and at risk for osteoporosis based off medical history and other findings; (2) Have a vertebral abnormality; (3) On glucocorticoid therapy for more than 3 months; (4) Have primary hyperparathyroidism; (5) On osteoporosis medications and need to assess response to drug therapy. Last bone density test (DXA Scan): 01/23/2020. HIV Screening: covered annually if you're between the age of 14-79. Also covered annually if you are younger than 13 and older than 72 with risk factors for HIV infection. For pregnant patients, it is covered up to 3 times per pregnancy.     Immunizations:  Immunization Recommendations   Influenza Vaccine Annual influenza vaccination during flu season is recommended for all persons aged >= 6 months who do not have contraindications   Pneumococcal Vaccine   * Pneumococcal conjugate vaccine = PCV13 (Prevnar 13), PCV15 (Vaxneuvance), PCV20 (Prevnar 20)  * Pneumococcal polysaccharide vaccine = PPSV23 (Pneumovax) Adults 55-80 yo with certain risk factors or if 69+ yo  If never received any pneumonia vaccine: recommend Prevnar 20 (PCV20)  Give PCV20 if previously received 1 dose of PCV13 or PPSV23   Hepatitis B Vaccine 3 dose series if at intermediate or high risk (ex: diabetes, end stage renal disease, liver disease)   Respiratory syncytial virus (RSV) Vaccine - COVERED BY MEDICARE PART D  * RSVPreF3 (Arexvy) CDC recommends that adults 61years of age and older may receive a single dose of RSV vaccine using shared clinical decision-making (SCDM)   Tetanus (Td) Vaccine - COST NOT COVERED BY MEDICARE PART B Following completion of primary series, a booster dose should be given every 10 years to maintain immunity against tetanus. Td may also be given as tetanus wound prophylaxis. Tdap Vaccine - COST NOT COVERED BY MEDICARE PART B Recommended at least once for all adults. For pregnant patients, recommended with each pregnancy. Shingles Vaccine (Shingrix) - COST NOT COVERED BY MEDICARE PART B  2 shot series recommended in those 19 years and older who have or will have weakened immune systems or those 50 years and older     Health Maintenance Due:      Topic Date Due   • Colorectal Cancer Screening  01/20/2032   • Hepatitis C Screening  Completed     Immunizations Due:      Topic Date Due   • COVID-19 Vaccine (4 - Booster for Moderna series) 10/11/2021   • Influenza Vaccine (1) 09/01/2023     Advance Directives   What are advance directives? Advance directives are legal documents that state your wishes and plans for medical care. These plans are made ahead of time in case you lose your ability to make decisions for yourself.  Advance directives can apply to any medical decision, such as the treatments you want, and if you want to donate organs. What are the types of advance directives? There are many types of advance directives, and each state has rules about how to use them. You may choose a combination of any of the following:  Living will: This is a written record of the treatment you want. You can also choose which treatments you do not want, which to limit, and which to stop at a certain time. This includes surgery, medicine, IV fluid, and tube feedings. Durable power of  for [facility]): This is a written record that states who you want to make healthcare choices for you when you are unable to make them for yourself. This person, called a proxy, is usually a family member or a friend. You may choose more than 1 proxy. Do not resuscitate (DNR) order:  A DNR order is used in case your heart stops beating or you stop breathing. It is a request not to have certain forms of treatment, such as CPR. A DNR order may be included in other types of advance directives. Medical directive: This covers the care that you want if you are in a coma, near death, or unable to make decisions for yourself. You can list the treatments you want for each condition. Treatment may include pain medicine, surgery, blood transfusions, dialysis, IV or tube feedings, and a ventilator (breathing machine). Values history: This document has questions about your views, beliefs, and how you feel and think about life. This information can help others choose the care that you would choose. Why are advance directives important? An advance directive helps you control your care. Although spoken wishes may be used, it is better to have your wishes written down. Spoken wishes can be misunderstood, or not followed. Treatments may be given even if you do not want them. An advance directive may make it easier for your family to make difficult choices about your care.    Weight Management   Why it is important to manage your weight:  Being overweight increases your risk of health conditions such as heart disease, high blood pressure, type 2 diabetes, and certain types of cancer. It can also increase your risk for osteoarthritis, sleep apnea, and other respiratory problems. Aim for a slow, steady weight loss. Even a small amount of weight loss can lower your risk of health problems. How to lose weight safely:  A safe and healthy way to lose weight is to eat fewer calories and get regular exercise. You can lose up about 1 pound a week by decreasing the number of calories you eat by 500 calories each day. Healthy meal plan for weight management:  A healthy meal plan includes a variety of foods, contains fewer calories, and helps you stay healthy. A healthy meal plan includes the following:  Eat whole-grain foods more often. A healthy meal plan should contain fiber. Fiber is the part of grains, fruits, and vegetables that is not broken down by your body. Whole-grain foods are healthy and provide extra fiber in your diet. Some examples of whole-grain foods are whole-wheat breads and pastas, oatmeal, brown rice, and bulgur. Eat a variety of vegetables every day. Include dark, leafy greens such as spinach, kale, josemanuel greens, and mustard greens. Eat yellow and orange vegetables such as carrots, sweet potatoes, and winter squash. Eat a variety of fruits every day. Choose fresh or canned fruit (canned in its own juice or light syrup) instead of juice. Fruit juice has very little or no fiber. Eat low-fat dairy foods. Drink fat-free (skim) milk or 1% milk. Eat fat-free yogurt and low-fat cottage cheese. Try low-fat cheeses such as mozzarella and other reduced-fat cheeses. Choose meat and other protein foods that are low in fat. Choose beans or other legumes such as split peas or lentils. Choose fish, skinless poultry (chicken or turkey), or lean cuts of red meat (beef or pork).  Before you cook meat or poultry, cut off any visible fat. Use less fat and oil. Try baking foods instead of frying them. Add less fat, such as margarine, sour cream, regular salad dressing and mayonnaise to foods. Eat fewer high-fat foods. Some examples of high-fat foods include french fries, doughnuts, ice cream, and cakes. Eat fewer sweets. Limit foods and drinks that are high in sugar. This includes candy, cookies, regular soda, and sweetened drinks. Exercise:  Exercise at least 30 minutes per day on most days of the week. Some examples of exercise include walking, biking, dancing, and swimming. You can also fit in more physical activity by taking the stairs instead of the elevator or parking farther away from stores. Ask your healthcare provider about the best exercise plan for you. © Copyright [employer] 2018 Information is for End User's use only and may not be sold, redistributed or otherwise used for commercial purposes.  All illustrations and images included in CareNotes® are the copyrighted property of [employer] or [address] [place] [address]

## (undated) DEVICE — RUNTHROUGH NS EXTRA FLOPPY PTCA GUIDEWIRE: Brand: RUNTHROUGH

## (undated) DEVICE — NC TREK CORONARY DILATATION CATHETER 4.0 MM X 15 MM / RAPID-EXCHANGE: Brand: NC TREK

## (undated) DEVICE — GLIDESHEATH SLENDER STAINLESS STEEL KIT: Brand: GLIDESHEATH SLENDER

## (undated) DEVICE — GUIDEWIRE WHOLEY .035 145 CM FLOP STR TIP

## (undated) DEVICE — CATH GUIDE LAUNCHER 5FR EBU 3.75

## (undated) DEVICE — TR BAND RADIAL ARTERY COMPRESSION DEVICE: Brand: TR BAND

## (undated) DEVICE — Device

## (undated) DEVICE — TREK CORONARY DILATATION CATHETER 2.50 MM X 15 MM / RAPID-EXCHANGE: Brand: TREK

## (undated) DEVICE — CATH GUIDE LAUNCHER 6FR EBU 3.5

## (undated) DEVICE — RADIFOCUS OPTITORQUE ANGIOGRAPHIC CATHETER: Brand: OPTITORQUE

## (undated) DEVICE — DGW .035 FC J3MM 260CM TEF: Brand: EMERALD

## (undated) DEVICE — GUIDELINER CATHETERS ARE INTENDED TO BE USED IN CONJUNCTION WITH GUIDE CATHETERS TO ACCESS DISCRETE REGIONS OF THE CORONARY AND/OR PERIPHERAL VASCULATURE, AND TO FACILITATE PLACEMENT OF INTERVENTIONAL DEVICES.: Brand: GUIDELINER® V3 CATHETER

## (undated) DEVICE — GUIDEWIRE WHOLEY HI TORQUE INTERM MOD J .035 145CM

## (undated) DEVICE — CATH DIAG 5FR IMPULSE 100CM FR4

## (undated) DEVICE — CATH BAL PTCA EMERGE MR 2 X 15MM